# Patient Record
Sex: FEMALE | Race: WHITE | NOT HISPANIC OR LATINO | Employment: OTHER | URBAN - METROPOLITAN AREA
[De-identification: names, ages, dates, MRNs, and addresses within clinical notes are randomized per-mention and may not be internally consistent; named-entity substitution may affect disease eponyms.]

---

## 2017-11-21 ENCOUNTER — OFFICE VISIT (OUTPATIENT)
Dept: FAMILY MEDICINE CLINIC | Facility: CLINIC | Age: 82
End: 2017-11-21

## 2017-11-21 VITALS
WEIGHT: 162.8 LBS | DIASTOLIC BLOOD PRESSURE: 80 MMHG | BODY MASS INDEX: 27.79 KG/M2 | HEART RATE: 76 BPM | SYSTOLIC BLOOD PRESSURE: 170 MMHG | OXYGEN SATURATION: 98 % | HEIGHT: 64 IN

## 2017-11-21 DIAGNOSIS — K58.2 IRRITABLE BOWEL SYNDROME WITH BOTH CONSTIPATION AND DIARRHEA: ICD-10-CM

## 2017-11-21 DIAGNOSIS — S80.812A ABRASION, LEFT LOWER LEG, INITIAL ENCOUNTER: Primary | ICD-10-CM

## 2017-11-21 DIAGNOSIS — I10 ESSENTIAL HYPERTENSION: ICD-10-CM

## 2017-11-21 DIAGNOSIS — L03.116 CELLULITIS OF LEFT LOWER EXTREMITY: ICD-10-CM

## 2017-11-21 DIAGNOSIS — M15.9 PRIMARY OSTEOARTHRITIS INVOLVING MULTIPLE JOINTS: ICD-10-CM

## 2017-11-21 PROCEDURE — 99213 OFFICE O/P EST LOW 20 MIN: CPT | Performed by: FAMILY MEDICINE

## 2017-11-21 RX ORDER — CEPHALEXIN 500 MG/1
500 CAPSULE ORAL 2 TIMES DAILY
Qty: 14 CAPSULE | Refills: 0 | Status: SHIPPED | OUTPATIENT
Start: 2017-11-21 | End: 2017-11-28

## 2017-11-21 NOTE — PROGRESS NOTES
Subjective   Dacia Nelson is a 89 y.o. female    HPI Comments: Patient presents for follow-up from urgent care where she was seen for a wound to her left lower leg.  She was started on cephalexin 500 mg twice a day.  The area remains erythematous and quite tender.  The wound has developed an eschar.  She started on her antibiotics 3 days ago.  She feels like it has improved.  She is concerned about her elevated blood pressure.  Patient previously was on a diuretic but apparently that was discontinued somewhere along the line as we have not seen her for couple of years.    Cellulitis   Associated symptoms include arthralgias and myalgias.       The following portions of the patient's history were reviewed and updated as appropriate: allergies, current medications, past social history and problem list    Review of Systems   Constitutional: Negative.    HENT: Negative.    Eyes: Negative.    Respiratory: Negative.    Cardiovascular: Negative.    Musculoskeletal: Positive for arthralgias, gait problem and myalgias.   Skin: Positive for color change and wound.   Hematological: Negative.    Psychiatric/Behavioral: Negative.        Objective     Vitals:    11/21/17 1138   BP: 170/80   Pulse: 76   SpO2: 98%       Physical Exam   Constitutional: She appears well-developed and well-nourished.   HENT:   Head: Normocephalic and atraumatic.   Eyes: Conjunctivae are normal.   Cardiovascular: Normal rate and regular rhythm.    Pulmonary/Chest: Effort normal and breath sounds normal.   Skin: Skin is warm and dry. There is erythema.   1.5 cm wound with eschar left distal calf area with surrounding tender erythema.   Psychiatric: She has a normal mood and affect. Her behavior is normal.   Nursing note and vitals reviewed.      Assessment/Plan   Problem List Items Addressed This Visit        Cardiovascular and Mediastinum    Essential hypertension       Digestive    Irritable bowel syndrome with both constipation and diarrhea        Musculoskeletal and Integument    Primary osteoarthritis involving multiple joints      Other Visit Diagnoses     Abrasion, left lower leg, initial encounter    -  Primary    Relevant Medications    mupirocin (BACTROBAN) 2 % ointment    cephalexin (KEFLEX) 500 MG capsule    Cellulitis of left lower extremity

## 2017-11-25 ENCOUNTER — HOSPITAL ENCOUNTER (EMERGENCY)
Facility: HOSPITAL | Age: 82
Discharge: HOME | End: 2017-11-25
Attending: EMERGENCY MEDICINE | Admitting: EMERGENCY MEDICINE

## 2017-11-25 VITALS
HEIGHT: 62 IN | HEART RATE: 81 BPM | BODY MASS INDEX: 28.52 KG/M2 | SYSTOLIC BLOOD PRESSURE: 157 MMHG | OXYGEN SATURATION: 95 % | DIASTOLIC BLOOD PRESSURE: 69 MMHG | RESPIRATION RATE: 18 BRPM | TEMPERATURE: 98.7 F | WEIGHT: 155 LBS

## 2017-11-25 DIAGNOSIS — I87.8 VENOUS STASIS: ICD-10-CM

## 2017-11-25 DIAGNOSIS — L03.116 CELLULITIS OF LEFT LOWER EXTREMITY: Primary | ICD-10-CM

## 2017-11-25 LAB
ALBUMIN SERPL-MCNC: 3.9 G/DL (ref 3.2–4.8)
ALBUMIN/GLOB SERPL: 1.1 G/DL (ref 1.5–2.5)
ALP SERPL-CCNC: 73 U/L (ref 25–100)
ALT SERPL W P-5'-P-CCNC: 15 U/L (ref 7–40)
ANION GAP SERPL CALCULATED.3IONS-SCNC: 10 MMOL/L (ref 3–11)
AST SERPL-CCNC: 23 U/L (ref 0–33)
BASOPHILS # BLD AUTO: 0.05 10*3/MM3 (ref 0–0.2)
BASOPHILS NFR BLD AUTO: 0.6 % (ref 0–1)
BILIRUB SERPL-MCNC: 1.1 MG/DL (ref 0.3–1.2)
BUN BLD-MCNC: 17 MG/DL (ref 9–23)
BUN/CREAT SERPL: 17 (ref 7–25)
CALCIUM SPEC-SCNC: 8.5 MG/DL (ref 8.7–10.4)
CHLORIDE SERPL-SCNC: 105 MMOL/L (ref 99–109)
CO2 SERPL-SCNC: 26 MMOL/L (ref 20–31)
CREAT BLD-MCNC: 1 MG/DL (ref 0.6–1.3)
DEPRECATED RDW RBC AUTO: 50.2 FL (ref 37–54)
EOSINOPHIL # BLD AUTO: 0.18 10*3/MM3 (ref 0–0.3)
EOSINOPHIL NFR BLD AUTO: 2.2 % (ref 0–3)
ERYTHROCYTE [DISTWIDTH] IN BLOOD BY AUTOMATED COUNT: 13.3 % (ref 11.3–14.5)
GFR SERPL CREATININE-BSD FRML MDRD: 52 ML/MIN/1.73
GLOBULIN UR ELPH-MCNC: 3.4 GM/DL
GLUCOSE BLD-MCNC: 101 MG/DL (ref 70–100)
HCT VFR BLD AUTO: 44.9 % (ref 34.5–44)
HGB BLD-MCNC: 15.3 G/DL (ref 11.5–15.5)
IMM GRANULOCYTES # BLD: 0.02 10*3/MM3 (ref 0–0.03)
IMM GRANULOCYTES NFR BLD: 0.2 % (ref 0–0.6)
LYMPHOCYTES # BLD AUTO: 1.3 10*3/MM3 (ref 0.6–4.8)
LYMPHOCYTES NFR BLD AUTO: 15.6 % (ref 24–44)
MCH RBC QN AUTO: 34.9 PG (ref 27–31)
MCHC RBC AUTO-ENTMCNC: 34.1 G/DL (ref 32–36)
MCV RBC AUTO: 102.3 FL (ref 80–99)
MONOCYTES # BLD AUTO: 0.75 10*3/MM3 (ref 0–1)
MONOCYTES NFR BLD AUTO: 9 % (ref 0–12)
NEUTROPHILS # BLD AUTO: 6.05 10*3/MM3 (ref 1.5–8.3)
NEUTROPHILS NFR BLD AUTO: 72.4 % (ref 41–71)
PLATELET # BLD AUTO: 237 10*3/MM3 (ref 150–450)
PMV BLD AUTO: 10 FL (ref 6–12)
POTASSIUM BLD-SCNC: 4.3 MMOL/L (ref 3.5–5.5)
PROT SERPL-MCNC: 7.3 G/DL (ref 5.7–8.2)
RBC # BLD AUTO: 4.39 10*6/MM3 (ref 3.89–5.14)
SODIUM BLD-SCNC: 141 MMOL/L (ref 132–146)
WBC NRBC COR # BLD: 8.35 10*3/MM3 (ref 3.5–10.8)

## 2017-11-25 PROCEDURE — 96368 THER/DIAG CONCURRENT INF: CPT

## 2017-11-25 PROCEDURE — 25010000002 VANCOMYCIN 10 G RECONSTITUTED SOLUTION: Performed by: PHYSICIAN ASSISTANT

## 2017-11-25 PROCEDURE — 25010000002 MEROPENEM: Performed by: PHYSICIAN ASSISTANT

## 2017-11-25 PROCEDURE — 85025 COMPLETE CBC W/AUTO DIFF WBC: CPT | Performed by: PHYSICIAN ASSISTANT

## 2017-11-25 PROCEDURE — 87205 SMEAR GRAM STAIN: CPT | Performed by: PHYSICIAN ASSISTANT

## 2017-11-25 PROCEDURE — 80053 COMPREHEN METABOLIC PANEL: CPT | Performed by: PHYSICIAN ASSISTANT

## 2017-11-25 PROCEDURE — 96365 THER/PROPH/DIAG IV INF INIT: CPT

## 2017-11-25 PROCEDURE — 96366 THER/PROPH/DIAG IV INF ADDON: CPT

## 2017-11-25 PROCEDURE — 87070 CULTURE OTHR SPECIMN AEROBIC: CPT | Performed by: PHYSICIAN ASSISTANT

## 2017-11-25 PROCEDURE — 99283 EMERGENCY DEPT VISIT LOW MDM: CPT

## 2017-11-25 RX ORDER — CLINDAMYCIN HYDROCHLORIDE 300 MG/1
300 CAPSULE ORAL 3 TIMES DAILY
Qty: 21 CAPSULE | Refills: 0 | Status: SHIPPED | OUTPATIENT
Start: 2017-11-25 | End: 2017-12-13

## 2017-11-25 RX ORDER — VANCOMYCIN/0.9 % SOD CHLORIDE 1.5G/250ML
1500 PLASTIC BAG, INJECTION (ML) INTRAVENOUS ONCE
Status: COMPLETED | OUTPATIENT
Start: 2017-11-25 | End: 2017-11-25

## 2017-11-25 RX ORDER — SODIUM CHLORIDE 0.9 % (FLUSH) 0.9 %
10 SYRINGE (ML) INJECTION AS NEEDED
Status: DISCONTINUED | OUTPATIENT
Start: 2017-11-25 | End: 2017-11-25 | Stop reason: HOSPADM

## 2017-11-25 RX ADMIN — MEROPENEM 1 G: 1 INJECTION, POWDER, FOR SOLUTION INTRAVENOUS at 13:21

## 2017-11-25 RX ADMIN — VANCOMYCIN HYDROCHLORIDE 1500 MG: 10 INJECTION, POWDER, LYOPHILIZED, FOR SOLUTION INTRAVENOUS at 13:21

## 2017-11-25 NOTE — ED PROVIDER NOTES
Subjective   HPI Comments: 89-year-old female presents with a one-week history of wound to the posterior aspect of her left lower extremity, mid calf.  Seen by  UTC 11/18, placed on keflex 500mg bid.  Seen by PCP 2 d later, added bactroban.  Wound is worsening, increased redness, increased painprimary care provider placed on bactroban ointment.  Increased pain, swelling, redness, and drainage.  No fevers, chills, sweats.    Patient is a 89 y.o. female presenting with lower extremity pain.   Lower Extremity Issue   Location:  Leg  Time since incident:  1 week  Injury: no    Leg location:  L leg  Pain details:     Quality:  Throbbing    Severity:  Moderate    Onset quality:  Gradual    Duration:  1 week    Timing:  Constant    Progression:  Worsening  Chronicity:  New  Dislocation: no    Foreign body present:  No foreign bodies  Tetanus status:  Unknown  Prior injury to area:  No  Relieved by:  Nothing  Worsened by:  Nothing  Ineffective treatments:  None tried  Associated symptoms: swelling    Associated symptoms: no back pain, no decreased ROM, no fatigue, no fever and no stiffness        Review of Systems   Constitutional: Negative for fatigue and fever.   Musculoskeletal: Negative for back pain and stiffness.   Skin: Positive for wound.        Per HPI   All other systems reviewed and are negative.      Past Medical History:   Diagnosis Date   • Cellulitis        Allergies   Allergen Reactions   • Eggs Or Egg-Derived Products Hives   • Penicillins    • Sulfa Antibiotics        History reviewed. No pertinent surgical history.    History reviewed. No pertinent family history.    Social History     Social History   • Marital status:      Spouse name: N/A   • Number of children: N/A   • Years of education: N/A     Social History Main Topics   • Smoking status: Never Smoker   • Smokeless tobacco: Never Used   • Alcohol use No   • Drug use: No   • Sexual activity: Defer     Other Topics Concern   • None     Social  History Narrative   • None           Objective   Physical Exam   Constitutional: She is oriented to person, place, and time. She appears well-developed and well-nourished. No distress.   HENT:   Head: Normocephalic and atraumatic.   Right Ear: External ear normal.   Left Ear: External ear normal.   Nose: Nose normal.   Mouth/Throat: Oropharynx is clear and moist. No oropharyngeal exudate.   Eyes: Conjunctivae and EOM are normal. Pupils are equal, round, and reactive to light. Right eye exhibits no discharge. Left eye exhibits no discharge. No scleral icterus.   Neck: Normal range of motion. Neck supple. No JVD present. No tracheal deviation present. No thyromegaly present.   Cardiovascular: Normal rate.    Pulmonary/Chest: Effort normal. No stridor. No respiratory distress.   Abdominal: Soft. She exhibits no distension.   Musculoskeletal: Normal range of motion. She exhibits no edema, tenderness or deformity.   Neurological: She is alert and oriented to person, place, and time. No cranial nerve deficit. She exhibits normal muscle tone. Coordination normal.   Skin: Skin is warm and dry. No rash noted. She is not diaphoretic. No erythema. No pallor.   2 cm diameter necrotic wound to mid posterior left calf.  3 cm surrounding erythema and induration, left lower extremity with slight increased soft tissue swelling diffusely compared to left.  Homans sign absent.  Neurovascular status intact distally.  No lymphangitic streaking   Psychiatric: She has a normal mood and affect. Her behavior is normal. Judgment and thought content normal.   Nursing note and vitals reviewed.      Procedures        Recent Results (from the past 24 hour(s))   CBC Auto Differential    Collection Time: 11/25/17  1:06 PM   Result Value Ref Range    WBC 8.35 3.50 - 10.80 10*3/mm3    RBC 4.39 3.89 - 5.14 10*6/mm3    Hemoglobin 15.3 11.5 - 15.5 g/dL    Hematocrit 44.9 (H) 34.5 - 44.0 %    .3 (H) 80.0 - 99.0 fL    MCH 34.9 (H) 27.0 - 31.0 pg     MCHC 34.1 32.0 - 36.0 g/dL    RDW 13.3 11.3 - 14.5 %    RDW-SD 50.2 37.0 - 54.0 fl    MPV 10.0 6.0 - 12.0 fL    Platelets 237 150 - 450 10*3/mm3    Neutrophil % 72.4 (H) 41.0 - 71.0 %    Lymphocyte % 15.6 (L) 24.0 - 44.0 %    Monocyte % 9.0 0.0 - 12.0 %    Eosinophil % 2.2 0.0 - 3.0 %    Basophil % 0.6 0.0 - 1.0 %    Immature Grans % 0.2 0.0 - 0.6 %    Neutrophils, Absolute 6.05 1.50 - 8.30 10*3/mm3    Lymphocytes, Absolute 1.30 0.60 - 4.80 10*3/mm3    Monocytes, Absolute 0.75 0.00 - 1.00 10*3/mm3    Eosinophils, Absolute 0.18 0.00 - 0.30 10*3/mm3    Basophils, Absolute 0.05 0.00 - 0.20 10*3/mm3    Immature Grans, Absolute 0.02 0.00 - 0.03 10*3/mm3   Comprehensive Metabolic Panel    Collection Time: 11/25/17  2:11 PM   Result Value Ref Range    Glucose 101 (H) 70 - 100 mg/dL    BUN 17 9 - 23 mg/dL    Creatinine 1.00 0.60 - 1.30 mg/dL    Sodium 141 132 - 146 mmol/L    Potassium 4.3 3.5 - 5.5 mmol/L    Chloride 105 99 - 109 mmol/L    CO2 26.0 20.0 - 31.0 mmol/L    Calcium 8.5 (L) 8.7 - 10.4 mg/dL    Total Protein 7.3 5.7 - 8.2 g/dL    Albumin 3.90 3.20 - 4.80 g/dL    ALT (SGPT) 15 7 - 40 U/L    AST (SGOT) 23 0 - 33 U/L    Alkaline Phosphatase 73 25 - 100 U/L    Total Bilirubin 1.1 0.3 - 1.2 mg/dL    eGFR Non African Amer 52 (L) >60 mL/min/1.73    Globulin 3.4 gm/dL    A/G Ratio 1.1 (L) 1.5 - 2.5 g/dL    BUN/Creatinine Ratio 17.0 7.0 - 25.0    Anion Gap 10.0 3.0 - 11.0 mmol/L     Note: In addition to lab results from this visit, the labs listed above may include labs taken at another facility or during a different encounter within the last 24 hours. Please correlate lab times with ED admission and discharge times for further clarification of the services performed during this visit.    No orders to display     Vitals:    11/25/17 1114 11/25/17 1126 11/25/17 1455   BP: (!) 188/80 159/72 157/69   BP Location: Right arm  Right arm   Patient Position: Sitting Sitting Sitting   Pulse: 79  81   Resp: 18  18   Temp: 98.4  "°F (36.9 °C)  98.7 °F (37.1 °C)   TempSrc: Oral  Oral   SpO2: 94%  95%   Weight: 155 lb (70.3 kg)     Height: 62\" (157.5 cm)       Medications   sodium chloride 0.9 % flush 10 mL (not administered)   vancomycin IVPB 1500 mg in 0.9% NaCl (Premix) 500 mL (0 mg Intravenous Stopped 11/25/17 1455)   meropenem (MERREM) 1 g/100 mL 0.9% NS VTB (mbp) (0 g Intravenous Stopped 11/25/17 1351)     ECG/EMG Results (last 24 hours)     ** No results found for the last 24 hours. **            ED Course  ED Course                  MDM    Final diagnoses:   Cellulitis of left lower extremity   Venous stasis            Prosper Jansen PA-C  11/25/17 5807    "

## 2017-11-25 NOTE — DISCHARGE INSTRUCTIONS
Discontinue Keflex.  May continue using mupirocin ointment.  Begin taking clindamycin tonight ×1 dose.  Keep leg elevated as much as possible.  Keep wound clean and dry.  You will be contacted by outpatient registration on the next business day to schedule your appointment with the wound care clinic.  Return to the emergency department immediately if any change or worsening of your symptoms.

## 2017-11-27 ENCOUNTER — TELEPHONE (OUTPATIENT)
Dept: FAMILY MEDICINE CLINIC | Facility: CLINIC | Age: 82
End: 2017-11-27

## 2017-11-27 LAB
BACTERIA SPEC AEROBE CULT: NORMAL
GRAM STN SPEC: NORMAL
GRAM STN SPEC: NORMAL

## 2017-11-27 NOTE — TELEPHONE ENCOUNTER
----- Message from Brandie Grey sent at 11/27/2017 11:53 AM EST -----  Contact: PATIENT  PATIENT NEED SOME MEDICATION FOR TENDER MOUTH. PATIENT SAYS SHE HAS VERY TENDER MOUTH BECAUSE OF MEDICATION SHE WAS PUT ON FROM HER ER VISIT.A GOOD CALL BACK NUMBER [phone]. THANK YOU.

## 2017-11-29 ENCOUNTER — HOSPITAL ENCOUNTER (OUTPATIENT)
Dept: PHYSICAL THERAPY | Facility: HOSPITAL | Age: 82
Setting detail: THERAPIES SERIES
Discharge: HOME | End: 2017-11-29

## 2017-11-29 ENCOUNTER — TELEPHONE (OUTPATIENT)
Dept: FAMILY MEDICINE CLINIC | Facility: CLINIC | Age: 82
End: 2017-11-29

## 2017-11-29 DIAGNOSIS — S81.802D OPEN WOUND OF LEFT LOWER EXTREMITY WITHOUT COMPLICATION, SUBSEQUENT ENCOUNTER: Primary | ICD-10-CM

## 2017-11-29 PROCEDURE — 97597 DBRDMT OPN WND 1ST 20 CM/<: CPT

## 2017-11-29 PROCEDURE — 97162 PT EVAL MOD COMPLEX 30 MIN: CPT

## 2017-11-29 PROCEDURE — G8991 OTHER PT/OT GOAL STATUS: HCPCS

## 2017-11-29 PROCEDURE — G8990 OTHER PT/OT CURRENT STATUS: HCPCS

## 2017-11-29 NOTE — THERAPY EVALUATION
Outpatient Rehabilitation - Wound/Debridement Initial Eval  [facility]     Patient Name: Dacia Nelson  : 1925  MRN: [id number]  Today's Date: 2017                Admit Date: 2017    Visit Dx:    ICD-10-CM ICD-9-CM   1. Open wound of left lower extremity without complication, subsequent encounter S81.802D V58.89     891.0       Patient Active Problem List   Diagnosis   • Essential hypertension   • Irritable bowel syndrome with both constipation and diarrhea   • Primary osteoarthritis involving multiple joints        Past Medical History:   Diagnosis Date   • Arthritis    • Cellulitis         Past Surgical History:   Procedure Laterality Date   • HYSTERECTOMY               Patient History       17 0945          History    Chief Complaint Ulcer, wound or other skin condition  -      Brief Description of Current Complaint Several weeks ago, pt ran into an ajar dresser drawer on her way to the bathroom at night. Pt left the area alone for about a week before the redness and swelling caused her to seek medical attn at urgent care. Pt was placed on abx for about one week, which did not help, so pt presented to the ER and was placed on different abx and bactroban ointment while a wound culture was pending. Pt reports the area burst open last night while she was in the restroom, spilling bright red blood everywhere.  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  -      Patient seeing anyone else for problem(s)? Yes   PCP  -      How has patient tried to help current problem? Pt has applied bactroban ointment 3x/day and has washed the area with soap and water. Only  covered the area with telfa/tape after it burst open yesterday.  -      What clinical tests have you had for this problem? Other 1 (comment)   wound culture  -MC      Results of Clinical Tests Normal culture - no growth  -MC      Related/Recent Hospitalizations No  -MC      Are you or can you be pregnant No  -MC      Pain     Pain Location Leg  -MC      Pain at Present 3  -MC      Pain at Best 0  -MC      Pain at Worst 7   when touched  -MC      Services    Are you currently receiving Home Health services No  -MC      Do you plan to receive Home Health services in the near future No  -MC      Daily Activities    Primary Language English  -MC      Are you able to read Yes  -MC      Are you able to write Yes  -MC      How does patient learn best? Listening;Demonstration  -MC      Teaching needs identified Management of Condition  -MC      Patient is concerned about/has problems with Other (comment)   wound mgmt  -MC      Does patient have problems with the following? None  -MC      Barriers to learning None  -MC      Pt Participated in POC and Goals Yes  -MC      Safety    Are you being hurt, hit, or frightened by anyone at home or in your life? No  -MC      Are you being neglected by a caregiver No  -MC        User Key  (r) = Recorded By, (t) = Taken By, (c) = Cosigned By    Initials Name Provider Type     Melanie Goldman, PT Physical Therapist          EVALUATION            LDA Wound       11/29/17 0945          Wound 11/29/17 0945 Left posterior leg abrasion    Wound - Properties Group Date first assessed: 11/29/17  - Time first assessed: 0945  - Side: Left  - Orientation: posterior  - Location: leg  - Type: abrasion  -MC    Wound WDL ex   minimal periwound redness, swelling throughout LLE  -      Dressing Appearance dried drainage;moist drainage;intact   telfa pad, thin plastic tape  -MC      Base debrided;purple;reddened;yellow;slough;moist;pink   thin line of purple/red superior, remainder  "red/yellow  -      Periwound Area intact;pink;dry;redness;swelling  -      Edges open;irregular  -      Length (cm) 2.4  -MC      Width (cm) 1.4  -MC      Depth (cm) 0.1  -MC      Drainage Characteristics/Odor serosanguineous;yellow;no odor  -MC      Drainage Amount small  -      Picture taken yes  -      Wound Cleaning cleansed with;other (see comments)   phase one  -      Wound Interventions debrided  -      Dressing Dressing applied;silver impregnated dressing;low-adherent;foam   mepilex Ag, 4\" optifoam, size 4 compressogrip  -      Periwound Care cleansed with pH balanced cleanser;dry periwound area maintained;moisturizer applied   eucerin  -        User Key  (r) = Recorded By, (t) = Taken By, (c) = Cosigned By    Initials Name Provider Type    PINA Goldman, PT Physical Therapist            WOUND DEBRIDEMENT  Debridement Site 1  Location- Site 1: LLE  Selective Debridement- Site 1: Wound Surface <20cmsq  Instruments- Site 1: #15, scapel, tweezers  Excised Tissue Description- Site 1: moderate, slough, other (comment) (dried blood/exudate)  Bleeding- Site 1: none                   Therapy Education       11/29/17 4753          Therapy Education    Education Details Reviewed s/sx of infection and discussed role of PT in wound care. Pt and/or caregivers to change the bandage every 2-3 days between appts with silver foam and optifoam. Explained importance of light compression and leg elevation to promote reduction of LLE edema. Pt may shower, and should try to use cling film to protect the bandage.  -      Given Symptoms/condition management;Bandaging/dressing change;Edema management  -      Program New  -      How Provided Verbal;Demonstration  -      Provided to Patient;Caregiver  -      Level of Understanding Verbalized;Teach back education performed  -        User Key  (r) = Recorded By, (t) = Taken By, (c) = Cosigned By    Initials Name Provider Type    PINA Goldman, " PT Physical Therapist          Recommendation and Plan        PT Assessment/Plan       17 0945       PT Assessment    Functional Limitations Performance in self-care ADL;Other (comment)   wound mgmt  -     Impairments Integumentary integrity;Pain  -     Assessment Comments Pt presents with open wound s/p abrasion about 2 weeks ago. The wound is moist and pink/red with adherent yellow slough. Pt also with thin line of purple/red along the superior aspect of the wound that presents like the ecchymosis around/under a skin tear. Pt also with mild LLE edema that is worsened from her baseline. D/t the pt's evolving wound symptoms, pt will benefit from skilled PT wound care for debridement, edema management, advanced dressings management, and other appropriate interventions.  -     Rehab Potential Good  -     Patient/caregiver participated in establishment of treatment plan and goals Yes  -     Patient would benefit from skilled therapy intervention Yes  -MC     PT Plan    PT Frequency 2x/week  -     Predicted Duration of Therapy Intervention (days/wks) 8 visits, 2x/week  -     Planned CPT's? PT THER PROC EA 15 MIN: 04480;PT SHARON DEBRIDE OPEN WOUND UP TO 20 CM: 88591;PT NLFU MIST: 62706;PT MULTI LAYER COMP SYS LE;PT THER SUPP EA 15 MIN  -     Physical Therapy Interventions (Optional Details) patient/family education;wound care  -     PT Plan Comments debridement, dressings mgmt. MIST if progress stalls. Compressogrip to LLE  -       User Key  (r) = Recorded By, (t) = Taken By, (c) = Cosigned By    Initials Name Provider Type    PINA Goldman, PT Physical Therapist            Goals      First Last   PT Goal Re-Cert Due Date: 18  PT Goal Re-Cert Due Date: 18   PT Short Term Goals  STG 1: Pt and/or caregiver will verbalize s/sx of infection.  STG 2: Pt will demonstrate 75% reduction in necrotic tissue to promote healing.  STG 3: Pt will demonstrate 25% reduction in wound  dimensions to indicate progress. PT Short Term Goals  STG 1: Pt and/or caregiver will verbalize s/sx of infection.  STG 2: Pt will demonstrate 75% reduction in necrotic tissue to promote healing.  STG 3: Pt will demonstrate 25% reduction in wound dimensions to indicate progress.   Long Term Goals  LTG 1: Pt and/or caregivers will demonstrate/verbalize independence with home dressing changes.  LTG 2: Pt will demonstrate no remaining necrotic tissue.   LTG 3: Pt will demonstrate 75% reduction in wound dimensions to indicate progress.  Long Term Goals  LTG 1: Pt and/or caregivers will demonstrate/verbalize independence with home dressing changes.  LTG 2: Pt will demonstrate no remaining necrotic tissue.   LTG 3: Pt will demonstrate 75% reduction in wound dimensions to indicate progress.                   PT OP Goals       11/29/17 0945       PT Short Term Goals    STG 1 Pt and/or caregiver will verbalize s/sx of infection.  -     STG 2 Pt will demonstrate 75% reduction in necrotic tissue to promote healing.  -     STG 3 Pt will demonstrate 25% reduction in wound dimensions to indicate progress.  -     Long Term Goals    LTG 1 Pt and/or caregivers will demonstrate/verbalize independence with home dressing changes.  -     LTG 2 Pt will demonstrate no remaining necrotic tissue.   -     LTG 3 Pt will demonstrate 75% reduction in wound dimensions to indicate progress.  -     Time Calculation    PT Goal Re-Cert Due Date 02/27/18  -       User Key  (r) = Recorded By, (t) = Taken By, (c) = Cosigned By    Initials Name Provider Type     Melanie Goldman, PT Physical Therapist          Time Calculation: Start Time: 0945    Therapy Charges for Today     Code Description Service Date Service Provider Modifiers Qty    10450725483 HC PT OTHER PRIME FUNCT CURRENT 11/29/2017 Melanie Goldman, PT GP, CL 1    42690217780 HC PT OTHER PRIME FUNCT PROJECTED 11/29/2017 Melanie Goldman, PT GP, CI 1    15843544923 HC PT  EVAL MOD COMPLEXITY 4 11/29/2017 Melanie Goldman, PT GP 1    81949700944 HC SHARON DEBRIDE OPEN WOUND UP TO 20CM 11/29/2017 Melanie Goldman, PT GP 1          PT G-Codes  PT Professional Judgement Used?: Yes  Outcome Measure Options: BWAT (Parker-Levi Wound Assess Tool), Lower Extremity Functional Scale (LEFS)  Score: Approximately 75% limited in wound management.  Functional Limitation: Other PT primary  Other PT Primary Current Status (): At least 60 percent but less than 80 percent impaired, limited or restricted  Other PT Primary Goal Status (): At least 1 percent but less than 20 percent impaired, limited or restricted     Melanie Goldman, PT  11/29/2017

## 2017-12-01 ENCOUNTER — HOSPITAL ENCOUNTER (OUTPATIENT)
Dept: PHYSICAL THERAPY | Facility: HOSPITAL | Age: 82
Setting detail: THERAPIES SERIES
Discharge: HOME | End: 2017-12-01

## 2017-12-01 DIAGNOSIS — S81.802D OPEN WOUND OF LEFT LOWER EXTREMITY WITHOUT COMPLICATION, SUBSEQUENT ENCOUNTER: Primary | ICD-10-CM

## 2017-12-01 PROCEDURE — 97597 DBRDMT OPN WND 1ST 20 CM/<: CPT

## 2017-12-01 NOTE — THERAPY WOUND CARE TREATMENT
Outpatient Rehabilitation - Wound/Debridement Treatment Note  [facility]     Patient Name: Dacia Nelson  : 1925  MRN: [id number]  Today's Date: 2017                Admit Date: 2017    Visit Dx:    ICD-10-CM ICD-9-CM   1. Open wound of left lower extremity without complication, subsequent encounter S81.802D V58.89     891.0   Posterior L calf:      Patient Active Problem List   Diagnosis   • Essential hypertension   • Irritable bowel syndrome with both constipation and diarrhea   • Primary osteoarthritis involving multiple joints        Past Medical History:   Diagnosis Date   • Arthritis    • Cellulitis         Past Surgical History:   Procedure Laterality Date   • HYSTERECTOMY           EVALUATION        PT Ortho       17 1115    Subjective Comments    Subjective Comments Patient states she has been propping her legs up, but upon further questioning by PT states she is only using a footstool with a pillow on it, not elevating to level of heart or higher.  -    Subjective Pain    Able to rate subjective pain? yes  -    Pre-Treatment Pain Level 2  -    Post-Treatment Pain Level 2  -    Subjective Pain Comment pain with dressing removal and debridement.  -    Transfers    Transfer, Comment seated in [facility] chair for tx  -    Gait Assessment/Treatment    Gait, [place] Level stand by assist  -    Gait, Assistive Device straight cane  -      User Key  (r) = Recorded By, (t) = Taken By, (c) = Cosigned By    Initials Name Provider Type    TELLO Almazan, PT Physical Therapist                    [facility] Wound       17 111          Wound 17 0945 Left posterior leg abrasion    Wound - Properties Group Date first assessed: 17  - Time first assessed: 945  - Side: Left  - Orientation: posterior  - Location: leg  - Type: abrasion  -MC    Wound WDL ex   min periwound erythema, BLE edema  -      Dressing Appearance dried drainage;moist  "drainage;intact   min drainage on ag foam only  -      Base debrided;reddened;yellow;slough;moist;pink;granulating   superior skin edge necrosed, black  -      Periwound Area intact;pink;dry;redness;swelling  -      Edges open;irregular  -JM      Length (cm) 2.2  -JM      Width (cm) 1.4  -JM      Depth (cm) 0.1  -JM      Drainage Characteristics/Odor serosanguineous;yellow;no odor  -      Drainage Amount small  -      Picture taken yes  -      Wound Cleaning cleansed with;other (see comments)   phase one  -      Wound Interventions debrided  -      Dressing Dressing applied;silver impregnated dressing;low-adherent;foam   mepilex ag, 4\" optifoam gentle  -      Periwound Care cleansed with pH balanced cleanser;dry periwound area maintained;moisturizer applied  -        User Key  (r) = Recorded By, (t) = Taken By, (c) = Cosigned By    Initials Name Provider Type     Melanie Goldman, PT Physical Therapist     Ronel Almazan, PT Physical Therapist              Lymphedema       12/01/17 1115          Lymphedema Edema Assessment    Ptting Edema Category By severity  -      Pitting Edema Severe   BLE with 3+ pitting edema  -      Skin Changes/Observations    Location/Assessment Lower Extremity  -      Lower Extremity Conditions bilateral:;intact;clean;dry;shiny  -      Lower Extremity Color/Pigment bilateral:;hyperpigmented   multiple spider veins BLE  -      Lymphedema Pulses/Capillary Refill    Lymphedema Pulses/Capillary Refill lower extremity pulses;capillary refill  -      Dorsalis Pedis Pulse --   difficult to palpate thru edema  -      Posterior Tibialis Pulse --   difficult to palpate thru edema  -      Capillary Refill lower extremity capillary refill  -      Lower Extremity Capillary Refill right:;left:;less than 3 seconds  -      Compression/Skin Care    Compression/Skin Care skin care;wrapping location;bandaging  -      Skin Care washed/dried;lotion applied  - "      Wrapping Location lower extremity  -      Wrapping Location LE left:;foot to knee  -      Bandage Layers cotton elastic stocking- single layer (comment size)   size 4 compressogrip  -        User Key  (r) = Recorded By, (t) = Taken By, (c) = Cosigned By    Initials Name Provider Type    TELLO Almazan, PT Physical Therapist          WOUND DEBRIDEMENT  Debridement Site 1  Location- Site 1: LLE  Selective Debridement- Site 1: Wound Surface <20cmsq  Instruments- Site 1: tweezers  Excised Tissue Description- Site 1: minimum, slough  Bleeding- Site 1: none                   Therapy Education       12/01/17 1115          Therapy Education    Education Details Recommended use of compression stockings to BLE, may want to follow-up with PCP re: pitting edema of BLE, continue with home dressing change every 2-3 days or PRN.  Reinforced leg elevation to level of heart or higher to reduce edema.  -      Given Symptoms/condition management;Bandaging/dressing change;Edema management  -      Program Modified  -      How Provided Verbal;Demonstration  -      Provided to Patient;Caregiver  -      Level of Understanding Verbalized;Teach back education performed  -        User Key  (r) = Recorded By, (t) = Taken By, (c) = Cosigned By    Initials Name Provider Type    TELLO Almazan, PT Physical Therapist          Recommendation and Plan        PT Assessment/Plan       12/01/17 1115       PT Assessment    Functional Limitations Performance in self-care ADL;Other (comment)   wound mgmt  -     Impairments Integumentary integrity;Pain  -     Assessment Comments LLE wound with small decrease in length.  Less slough on wound base today.  Superior wound edge that was purple last tx now black and necrosed, should slough off over next couple txs.  Pt may benefit from MIST if not progressing as expected after next week.  -     Rehab Potential Good  -     Patient/caregiver participated in establishment  of treatment plan and goals Yes  -     Patient would benefit from skilled therapy intervention Yes  -     PT Plan    PT Frequency 2x/week  -     Physical Therapy Interventions (Optional Details) patient/family education;wound care  -     PT Plan Comments debridement, compression, dressing management, MIST if indicated  -       User Key  (r) = Recorded By, (t) = Taken By, (c) = Cosigned By    Initials Name Provider Type    TELLO Almazan, PT Physical Therapist          Goals        PT OP Goals       12/01/17 1115       Time Calculation    PT Goal Re-Cert Due Date 02/27/18  -       User Key  (r) = Recorded By, (t) = Taken By, (c) = Cosigned By    Initials Name Provider Type    TELLO Almazan, PT Physical Therapist          PT Goal Re-Cert Due Date: 02/27/18            Time Calculation: Start Time: 1115    Therapy Charges for Today     Code Description Service Date Service Provider Modifiers Qty    [id number]  SHARON DEBRIDE OPEN WOUND UP TO 20CM 12/1/2017 Ronel Almazan, PT GP 1                Ronel Almazan, PT  12/1/2017

## 2017-12-04 ENCOUNTER — HOSPITAL ENCOUNTER (OUTPATIENT)
Dept: PHYSICAL THERAPY | Facility: HOSPITAL | Age: 82
Setting detail: THERAPIES SERIES
Discharge: HOME | End: 2017-12-04

## 2017-12-04 DIAGNOSIS — S81.802D OPEN WOUND OF LEFT LOWER EXTREMITY WITHOUT COMPLICATION, SUBSEQUENT ENCOUNTER: Primary | ICD-10-CM

## 2017-12-04 PROCEDURE — 97597 DBRDMT OPN WND 1ST 20 CM/<: CPT

## 2017-12-04 NOTE — THERAPY WOUND CARE TREATMENT
Outpatient Rehabilitation - Wound/Debridement Treatment Note  [facility]     Patient Name: Dacia Nelson  : 1925  MRN: [id number]  Today's Date: 2017                Admit Date: 2017    Visit Dx:    ICD-10-CM ICD-9-CM   1. Open wound of left lower extremity without complication, subsequent encounter S81.802D V58.89     891.0       Patient Active Problem List   Diagnosis   • Essential hypertension   • Irritable bowel syndrome with both constipation and diarrhea   • Primary osteoarthritis involving multiple joints        Past Medical History:   Diagnosis Date   • Arthritis    • Cellulitis         Past Surgical History:   Procedure Laterality Date   • HYSTERECTOMY           EVALUATION        PT Ortho       17 1115    Subjective Comments    Subjective Comments No complaints or changes. Pt reports she tries to prop her legs up, but it's difficult to get them very high. Is going to ask her MD next week about a diuretic.  -    Subjective Pain    Able to rate subjective pain? yes  -MC    Pre-Treatment Pain Level 2  -MC    Post-Treatment Pain Level 4  -MC    Subjective Pain Comment pain with dressing removal and debridement  -    Transfers    Transfers, Sit-Stand [place] contact guard assist  -MC    Transfers, Stand-Sit [place] contact guard assist  -MC    Transfer, Comment remained in transport w/c for tx. Stood to take down/reapply stockings  -      User Key  (r) = Recorded By, (t) = Taken By, (c) = Cosigned By    Initials Name Provider Type    PINA Goldman, PT Physical Therapist                    [facility] Wound       17 1115          Wound 17 0945 Left posterior leg abrasion    Wound - Properties Group Date first assessed: 17  - Time first assessed: 945  - Side: Left  - Orientation: posterior  - Location: leg  - Type: abrasion  -MC    Wound WDL ex   min periwound erythema, BLE edema  -MC      Dressing Appearance dried drainage;moist  "drainage;intact   min drainage on ag foam only  -      Base debrided;reddened;yellow;slough;moist;pink;granulating   superior skin edge necrosed, black  -      Periwound Area intact;pink;dry;redness;swelling  -      Edges open;irregular  -MC      Length (cm) 2  -MC      Width (cm) 1.3  -MC      Depth (cm) 0.1  -MC      Drainage Characteristics/Odor serosanguineous;yellow;no odor  -MC      Drainage Amount small  -      Picture taken yes  -      Wound Cleaning cleansed with;other (see comments)   phase one  -      Wound Interventions debrided;honey   added small drop of honey to address slough/dryness  -      Dressing Dressing applied;silver impregnated dressing;low-adherent;foam   mepilex Ag, 4\" optifoam gentle, size 4 compressogrip  -      Periwound Care cleansed with pH balanced cleanser;dry periwound area maintained   theraworx wipe  -        User Key  (r) = Recorded By, (t) = Taken By, (c) = Cosigned By    Initials Name Provider Type    PINA Goldman, PT Physical Therapist            WOUND DEBRIDEMENT  Debridement Site 1  Location- Site 1: LLE  Selective Debridement- Site 1: Wound Surface <20cmsq  Instruments- Site 1: tweezers  Excised Tissue Description- Site 1: minimum, slough, other (comment) (necrotic blackened skin at superior edge)  Bleeding- Site 1: scant, held pressure, 1 minute                   Therapy Education       12/04/17 1115          Therapy Education    Education Details Pt to follow up with MD re: use of diuretics and fluid retention. Pt to continue to try to elevate legs.  -      Given Symptoms/condition management;Bandaging/dressing change;Edema management  -      Program Reinforced  -      How Provided Verbal;Demonstration  -MC      Provided to Patient;Caregiver  -      Level of Understanding Verbalized;Teach back education performed  -        User Key  (r) = Recorded By, (t) = Taken By, (c) = Cosigned By    Initials Name Provider Type    PINA SHER" Jones, PT Physical Therapist          Recommendation and Plan        PT Assessment/Plan       12/04/17 1115       PT Assessment    Functional Limitations Performance in self-care ADL;Other (comment)   wound mgmt  -     Impairments Integumentary integrity;Pain  -     Assessment Comments LLE with another small decrease in length since last session. PT able to debride portion of necrotic superior wound edge today along with some adherent slough/biofilm. However, debridement is limited by c/o pain. Added small amt of therahoney to attempt to encourage autolytic debridement between sessions. Will likely initiate MIST Friday or next week if progress continues to be slow.  -     Rehab Potential Good  -     Patient/caregiver participated in establishment of treatment plan and goals Yes  -     Patient would benefit from skilled therapy intervention Yes  -     PT Plan    PT Frequency 2x/week  -     Physical Therapy Interventions (Optional Details) patient/family education;wound care  -     PT Plan Comments debridement, compression, dressings management. MIST if indicated in next 1-2 treatments.  -       User Key  (r) = Recorded By, (t) = Taken By, (c) = Cosigned By    Initials Name Provider Type     Melanie Goldman, PT Physical Therapist          Goals        PT OP Goals       12/04/17 1115       Time Calculation    PT Goal Re-Cert Due Date 02/27/18  -       User Key  (r) = Recorded By, (t) = Taken By, (c) = Cosigned By    Initials Name Provider Type     Melanie Goldman, PT Physical Therapist          PT Goal Re-Cert Due Date: 02/27/18            Time Calculation: Start Time: 1115    Therapy Charges for Today     Code Description Service Date Service Provider Modifiers Qty    [id number]  SHARON DEBRIDE OPEN WOUND UP TO 20CM 12/4/2017 Melanie Goldman, PT GP 1                Melanie Goldman, PT  12/4/2017

## 2017-12-08 ENCOUNTER — HOSPITAL ENCOUNTER (OUTPATIENT)
Dept: PHYSICAL THERAPY | Facility: HOSPITAL | Age: 82
Setting detail: THERAPIES SERIES
Discharge: HOME | End: 2017-12-08

## 2017-12-08 PROCEDURE — 29581 APPL MULTLAYER CMPRN SYS LEG: CPT

## 2017-12-08 NOTE — THERAPY WOUND CARE TREATMENT
Outpatient Rehabilitation - Wound/Debridement Treatment Note  [facility]     Patient Name: Dacia Nelson  : 1925  MRN: [id number]  Today's Date: 2017                Admit Date: 2017    Visit Dx:  No diagnosis found.    Patient Active Problem List   Diagnosis   • Essential hypertension   • Irritable bowel syndrome with both constipation and diarrhea   • Primary osteoarthritis involving multiple joints        Past Medical History:   Diagnosis Date   • Arthritis    • Cellulitis         Past Surgical History:   Procedure Laterality Date   • HYSTERECTOMY           EVALUATION        PT Ortho       17 1400    Subjective Comments    Subjective Comments pt stated that with the last dressing change at home the home did burn more than it did with the dressing change done in OP   -MF    Subjective Pain    Able to rate subjective pain? yes  -MF    Pre-Treatment Pain Level 2  -MF    Post-Treatment Pain Level 2  -MF    Transfers    Transfers, Sit-Stand [place] contact guard assist  -MF    Transfers, Stand-Sit [place] contact guard assist  -MF    Transfer, Comment pt stood and transfered from w/c to bed. seen sitting EOB   -MF    Gait Assessment/Treatment    Gait, [place] Level stand by assist  -MF    Gait, Assistive Device straight cane  -      User Key  (r) = Recorded By, (t) = Taken By, (c) = Cosigned By    Initials Name Provider Type     Shaquille Howard, PT Physical Therapist                    [facility] Wound       17 1400          Wound 17 0945 Left posterior leg abrasion    Wound - Properties Group Date first assessed: 17  - Time first assessed: 945  - Side: Left  - Orientation: posterior  - Location: leg  - Type: abrasion  -MC    Wound WDL ex  -MF      Dressing Appearance moist drainage;intact  -MF      Base reddened;moist;pink;granulating  -      Periwound Area intact;pink;dry;redness;swelling  -      Edges open;irregular  -MF      Drainage  Characteristics/Odor serosanguineous;yellow;no odor  -MF      Drainage Amount small  -MF      Wound Cleaning cleansed with;other (see comments)   phase one  -MF      Dressing foam;low-adherent;silver impregnated dressing   family to apply therahoney at home.  Ag foam and optifoam  -MF        User Key  (r) = Recorded By, (t) = Taken By, (c) = Cosigned By    Initials Name Provider Type    ALESSANDRA Howard, PT Physical Therapist     Melanie Goldman, PT Physical Therapist              Lymphedema       12/08/17 1400          Lymphedema Edema Assessment    Ptting Edema Category By severity  -      Pitting Edema Moderate  -MF      Compression/Skin Care    Compression/Skin Care skin care;wrapping location;bandaging  -MF      Skin Care washed/dried;lotion applied  -MF      Wrapping Location lower extremity  -MF      Wrapping Location LE left:;foot to knee  -MF      Bandage Layers cotton elastic stocking- double layer (comment size)   size 4   -MF        User Key  (r) = Recorded By, (t) = Taken By, (c) = Cosigned By    Initials Name Provider Type    ALESSANDRA Howard, PT Physical Therapist          WOUND DEBRIDEMENT                      Therapy Education       12/08/17 1400          Therapy Education    Education Details Pt to cont with compression wrapping / LE elevation and diuretics per MD recommendation.   -MF      Given Symptoms/condition management;Bandaging/dressing change;Edema management  -      Program Reinforced  -MF      How Provided Verbal;Demonstration  -MF      Provided to Patient;Caregiver  -MF      Level of Understanding Verbalized;Teach back education performed  -        User Key  (r) = Recorded By, (t) = Taken By, (c) = Cosigned By    Initials Name Provider Type    ALESSANDRA Howard, PT Physical Therapist          Recommendation and Plan        PT Assessment/Plan       12/08/17 1400       PT Assessment    Functional Limitations Performance in self-care ADL;Other (comment)  -MF      Impairments Integumentary integrity;Pain  -     Assessment Comments Post LE wound noted to be reepithelializing well today with therahoney helping to autolytically debride wound and allow for more of slough to be removed today during cleaning.  Pt will benefit from cont compression to help reduce edema and Ag foam to decrease bioburden to improve healing potential.   -     Rehab Potential Good  -     Patient/caregiver participated in establishment of treatment plan and goals Yes  -     Patient would benefit from skilled therapy intervention Yes  -     PT Plan    PT Frequency 2x/week  -     Physical Therapy Interventions (Optional Details) patient/family education;wound care  -     PT Plan Comments debridement PRN with compression wrapping.   -       User Key  (r) = Recorded By, (t) = Taken By, (c) = Cosigned By    Initials Name Provider Type     Shaquille Howard, PT Physical Therapist          Goals        PT OP Goals       12/08/17 1400       Time Calculation    PT Goal Re-Cert Due Date 02/27/17  -       User Key  (r) = Recorded By, (t) = Taken By, (c) = Cosigned By    Initials Name Provider Type     Shaquille Howard, PT Physical Therapist          PT Goal Re-Cert Due Date: 02/27/17            Time Calculation: Start Time: 1400  Total Timed Code Minutes- PT: 25 minute(s)    Therapy Charges for Today     Code Description Service Date Service Provider Modifiers Qty    61752399719 HC PT MULTI LAYER COMP SYS BELOW KNEE 12/8/2017 Shaquille Howard, PT GP 1                Shaquille Howard, PT  12/8/2017

## 2017-12-11 ENCOUNTER — HOSPITAL ENCOUNTER (OUTPATIENT)
Dept: PHYSICAL THERAPY | Facility: HOSPITAL | Age: 82
Setting detail: THERAPIES SERIES
Discharge: HOME | End: 2017-12-11

## 2017-12-11 DIAGNOSIS — S81.802D OPEN WOUND OF LEFT LOWER EXTREMITY WITHOUT COMPLICATION, SUBSEQUENT ENCOUNTER: Primary | ICD-10-CM

## 2017-12-11 PROCEDURE — 97597 DBRDMT OPN WND 1ST 20 CM/<: CPT

## 2017-12-11 NOTE — THERAPY WOUND CARE TREATMENT
Outpatient Rehabilitation - Wound/Debridement Treatment Note  [facility]     Patient Name: Dacia Nelson  : 1925  MRN: [id number]  Today's Date: 2017                Admit Date: 2017    Visit Dx:    ICD-10-CM ICD-9-CM   1. Open wound of left lower extremity without complication, subsequent encounter S81.802D V58.89     891.0       Patient Active Problem List   Diagnosis   • Essential hypertension   • Irritable bowel syndrome with both constipation and diarrhea   • Primary osteoarthritis involving multiple joints        Past Medical History:   Diagnosis Date   • Arthritis    • Cellulitis         Past Surgical History:   Procedure Laterality Date   • HYSTERECTOMY           EVALUATION        PT Ortho       17 1530    Subjective Comments    Subjective Comments Pt had no complaints today.   -MF    Subjective Pain    Able to rate subjective pain? yes  -MF    Pre-Treatment Pain Level 0  -MF    Post-Treatment Pain Level 0  -MF    Transfers    Transfer, Comment pt to and from dept via w/c seen sitting in w/c for tx  -MF      User Key  (r) = Recorded By, (t) = Taken By, (c) = Cosigned By    Initials Name Provider Type    ALESSANDRA Howard, PT Physical Therapist                    [facility] Wound       17 1530          Wound 17 0945 Left posterior leg abrasion    Wound - Properties Group Date first assessed: 17  - Time first assessed: 945  - Side: Left  - Orientation: posterior  - Location: leg  - Type: abrasion  -MC    Wound WDL ex  -MF      Dressing Appearance moist drainage;intact  -MF      Base reddened;moist;pink;granulating  -MF      Periwound Area intact;pink;dry;redness;swelling  -MF      Edges open;irregular  -MF      Length (cm) 1  -MF      Width (cm) 1  -MF      Depth (cm) 0.1  -MF      Drainage Characteristics/Odor serosanguineous;yellow;no odor  -MF      Drainage Amount scant  -MF      Picture taken yes  -MF      Wound Cleaning irrigated with;sterile normal  saline  -MF      Wound Interventions debrided  -MF      Dressing foam;low-adherent;petroleum-based dressing  -MF        User Key  (r) = Recorded By, (t) = Taken By, (c) = Cosigned By    Initials Name Provider Type    ALESSANDRA Howard, PT Physical Therapist     Melanie Goldman, PT Physical Therapist            WOUND DEBRIDEMENT  Debridement Site 1  Location- Site 1: LLE  Selective Debridement- Site 1: Wound Surface <20cmsq  Instruments- Site 1: tweezers  Excised Tissue Description- Site 1: minimum, slough  Bleeding- Site 1: none                   Therapy Education       12/11/17 1530          Therapy Education    Education Details PT held compression today and discussed home dressing changes with pt until next appt.   -MF      Given Symptoms/condition management;Bandaging/dressing change;Edema management  -MF      Program Reinforced  -MF      How Provided Verbal;Demonstration  -MF      Provided to Patient;Caregiver  -MF      Level of Understanding Verbalized;Teach back education performed  -MF        User Key  (r) = Recorded By, (t) = Taken By, (c) = Cosigned By    Initials Name Provider Type    ALESSANDRA Howard, PT Physical Therapist          Recommendation and Plan        PT Assessment/Plan       12/11/17 1530       PT Assessment    Functional Limitations Performance in self-care ADL;Other (comment)  -MF     Impairments Integumentary integrity;Pain  -MF     Assessment Comments Post LE wound cont to reepithelialize well, but slightly dry wound base noted.  PT applied xeroform to help maintain moisture to improve healing potential and to allow for easier removal of remaining slough next visit. no LE edema noted in either leg and compression held to determine further need for LE compression.   -MF     Rehab Potential Good  -MF     Patient/caregiver participated in establishment of treatment plan and goals Yes  -MF     Patient would benefit from skilled therapy intervention Yes  -MF     PT Plan    PT  Frequency 2x/week  -     Physical Therapy Interventions (Optional Details) wound care;patient/family education  -     PT Plan Comments cont with home dressing changes and return in 4 days to determine further need for debridement / dressing management.   -       User Key  (r) = Recorded By, (t) = Taken By, (c) = Cosigned By    Initials Name Provider Type     Shaquille Howard, PT Physical Therapist          Goals        PT OP Goals       12/11/17 1530       Time Calculation    PT Goal Re-Cert Due Date 02/27/18  -       User Key  (r) = Recorded By, (t) = Taken By, (c) = Cosigned By    Initials Name Provider Type     Shaquille Howard, PT Physical Therapist          PT Goal Re-Cert Due Date: 02/27/18            Time Calculation: Start Time: 1530  Total Timed Code Minutes- PT: 25 minute(s)    Therapy Charges for Today     Code Description Service Date Service Provider Modifiers Qty    [id number] HC SHARON DEBRIDE OPEN WOUND UP TO 20CM 12/11/2017 Shaquille Howard, PT GP 1                Shaquille Howard PT  12/11/2017

## 2017-12-13 ENCOUNTER — OFFICE VISIT (OUTPATIENT)
Dept: FAMILY MEDICINE CLINIC | Facility: CLINIC | Age: 82
End: 2017-12-13

## 2017-12-13 VITALS
SYSTOLIC BLOOD PRESSURE: 122 MMHG | DIASTOLIC BLOOD PRESSURE: 78 MMHG | BODY MASS INDEX: 28.16 KG/M2 | TEMPERATURE: 98.3 F | HEIGHT: 62 IN | OXYGEN SATURATION: 98 % | HEART RATE: 75 BPM | WEIGHT: 153 LBS

## 2017-12-13 DIAGNOSIS — L03.116 CELLULITIS OF LEFT LOWER EXTREMITY: Primary | ICD-10-CM

## 2017-12-13 DIAGNOSIS — S80.812D ABRASION, LEFT LOWER LEG, SUBSEQUENT ENCOUNTER: ICD-10-CM

## 2017-12-13 PROCEDURE — 99213 OFFICE O/P EST LOW 20 MIN: CPT | Performed by: FAMILY MEDICINE

## 2017-12-13 NOTE — PROGRESS NOTES
Subjective   Dacia Nelson is a 89 y.o. female    HPI Comments: Patient presents for a follow-up visit regarding left lower extremity abrasion subsequent cellulitis.  She has been attending outpatient physical therapy and wound care on a regular basis.  She and the therapist are very pleased with the progress she has made.  Erythema has resolved and tenderness markedly reduced.  The wound has granulated in nicely and patient is very pleased.  She has no other complaints today.    Wound Check         The following portions of the patient's history were reviewed and updated as appropriate: allergies, current medications, past social history and problem list    Review of Systems   Constitutional: Negative.    HENT: Negative.    Eyes: Negative.    Respiratory: Negative.    Cardiovascular: Negative.    Musculoskeletal: Positive for arthralgias, gait problem and myalgias.   Skin: Positive for color change and wound.   Hematological: Negative.    Psychiatric/Behavioral: Negative.        Objective     Vitals:    12/13/17 1417   BP: 122/78   Pulse: 75   Temp: 98.3 °F (36.8 °C)   SpO2: 98%       Physical Exam   Constitutional: She appears well-developed and well-nourished.   HENT:   Head: Normocephalic and atraumatic.   Eyes: Conjunctivae are normal.   Cardiovascular: Normal rate and regular rhythm.    Pulmonary/Chest: Effort normal and breath sounds normal.   Skin: Skin is warm and dry. Abrasion noted. There is erythema.   Psychiatric: She has a normal mood and affect. Her behavior is normal.   Nursing note and vitals reviewed.      Assessment/Plan   Problem List Items Addressed This Visit     None      Visit Diagnoses     Cellulitis of left lower extremity    -  Primary    Abrasion, left lower leg, subsequent encounter

## 2017-12-18 ENCOUNTER — HOSPITAL ENCOUNTER (OUTPATIENT)
Dept: PHYSICAL THERAPY | Facility: HOSPITAL | Age: 82
Setting detail: THERAPIES SERIES
Discharge: HOME | End: 2017-12-18

## 2017-12-18 DIAGNOSIS — S81.802D OPEN WOUND OF LEFT LOWER EXTREMITY WITHOUT COMPLICATION, SUBSEQUENT ENCOUNTER: Primary | ICD-10-CM

## 2017-12-18 PROCEDURE — 97597 DBRDMT OPN WND 1ST 20 CM/<: CPT

## 2017-12-18 NOTE — THERAPY WOUND CARE TREATMENT
Outpatient Rehabilitation - Wound/Debridement Treatment Note   [place]     Patient Name: Dacia Nelson  : 1925  MRN: [id number]  Today's Date: 2017                Admit Date: 2017    Visit Dx:    ICD-10-CM ICD-9-CM   1. Open wound of left lower extremity without complication, subsequent encounter S81.802D V58.89     891.0     Posterior L calf:      Patient Active Problem List   Diagnosis   • Essential hypertension   • Irritable bowel syndrome with both constipation and diarrhea   • Primary osteoarthritis involving multiple joints        Past Medical History:   Diagnosis Date   • Arthritis    • Cellulitis         Past Surgical History:   Procedure Laterality Date   • HYSTERECTOMY           EVALUATION        PT Ortho       17 1430    Subjective Comments    Subjective Comments Pt without complaints, states the bandage came off yesterday so she had to put a new one on but did not have the xeroform.  -JM    Subjective Pain    Able to rate subjective pain? yes  -JM    Pre-Treatment Pain Level 0  -JM    Post-Treatment Pain Level 0  -JM    Transfers    [facility] daughter  -      User Key  (r) = Recorded By, (t) = Taken By, (c) = Cosigned By    Initials Name Provider Type    TELLO Almazan, PT Physical Therapist                    LDA Wound       17 1430          Wound 17 0945 Left posterior leg abrasion    Wound - Properties Group Date first assessed: 17  - Time first assessed: 945  - Side: Left  - Orientation: posterior  - Location: leg  - Type: abrasion  -MC    Wound WDL ex  -JM      Dressing Appearance intact;dried drainage  -JM      Base reddened;epithelialization;pink;clean;yellow   90% intact new skin, 10% dry adherent crust  -JM      Periwound Area intact;pink;dry;swelling  -JM      Edges other (see comments)   indistinct  -JM      Length (cm) 0.5  -JM      Width (cm) 0.4  -JM      Depth (cm)  "0  -      Drainage Characteristics/Odor no odor;serous  -      Drainage Amount scant  -      Picture taken yes  -      Wound Cleaning cleansed with;other (see comments)   phase one  -      Wound Interventions debrided  -      Dressing Dressing applied;petroleum-based dressing;low-adherent;foam   xeroform, 4\" optifoam gentle  -        User Key  (r) = Recorded By, (t) = Taken By, (c) = Cosigned By    Initials Name Provider Type    PINA Goldman, PT Physical Therapist    TELLO Almazan, PT Physical Therapist            WOUND DEBRIDEMENT  Debridement Site 1  Location- Site 1: LLE  Selective Debridement- Site 1: Wound Surface <20cmsq  Instruments- Site 1: tweezers, scissors  Excised Tissue Description- Site 1: moderate, other (comment) (hypertrophic crust, dried exudate)  Bleeding- Site 1: none                   Therapy Education       12/18/17 1430          Therapy Education    Education Details Wound nearly healed, continue to use dressings x1 week or until fully closed, change 3x/week or if soiled/disrupted.  May call to cancel follow-up if wound is healed.  -      Given Symptoms/condition management;Bandaging/dressing change;Edema management  -      Program Reinforced  -      How Provided Verbal;Demonstration  -      Provided to Patient;Caregiver  -      Level of Understanding Verbalized;Teach back education performed  -        User Key  (r) = Recorded By, (t) = Taken By, (c) = Cosigned By    Initials Name Provider Type    TELLO Almazan, PT Physical Therapist          Recommendation and Plan        PT Assessment/Plan       12/18/17 1430       PT Assessment    Functional Limitations Performance in self-care ADL;Other (comment)  -     Impairments Integumentary integrity;Pain  -     Assessment Comments LLE wound re-epithelialized, with only small yellow crust remaining too adherent to debride.  Anticipate full closure of wound in next few days.  Instructed pt/family in " continued home dressing changes to perform 3x/week, follow-up 12/27/17 PRN.  -     Rehab Potential Good  -     Patient/caregiver participated in establishment of treatment plan and goals Yes  -     Patient would benefit from skilled therapy intervention Yes  -     PT Plan    PT Frequency Other (comment)   follow-up in 1 week PRN  -     Physical Therapy Interventions (Optional Details) patient/family education;wound care  -     PT Plan Comments tentative d/c today  -       User Key  (r) = Recorded By, (t) = Taken By, (c) = Cosigned By    Initials Name Provider Type    TELLO Almazan, PT Physical Therapist          Goals        PT OP Goals       12/18/17 1430       Time Calculation    PT Goal Re-Cert Due Date 02/27/18  -       User Key  (r) = Recorded By, (t) = Taken By, (c) = Cosigned By    Initials Name Provider Type    TELLO Almazan, PT Physical Therapist          PT Goal Re-Cert Due Date: 02/27/18            Time Calculation: Start Time: 1430    Therapy Charges for Today     Code Description Service Date Service Provider Modifiers Qty    58174601032 HC SHARON DEBRIDE OPEN WOUND UP TO 20CM 12/18/2017 Ronel Almazan, PT GP 1                Ronel Almazan, PT  12/18/2017

## 2017-12-27 ENCOUNTER — HOSPITAL ENCOUNTER (OUTPATIENT)
Dept: PHYSICAL THERAPY | Facility: HOSPITAL | Age: 82
Setting detail: THERAPIES SERIES
Discharge: HOME | End: 2017-12-27

## 2017-12-27 DIAGNOSIS — S81.802D OPEN WOUND OF LEFT LOWER EXTREMITY WITHOUT COMPLICATION, SUBSEQUENT ENCOUNTER: Primary | ICD-10-CM

## 2017-12-27 PROCEDURE — G8990 OTHER PT/OT CURRENT STATUS: HCPCS

## 2017-12-27 PROCEDURE — G8992 OTHER PT/OT  D/C STATUS: HCPCS

## 2017-12-27 PROCEDURE — G8991 OTHER PT/OT GOAL STATUS: HCPCS

## 2017-12-27 NOTE — THERAPY WOUND CARE TREATMENT
Outpatient Rehabilitation - Wound/Debridement Treatment Note   [place]     Patient Name: Dacia Nelson  : 1925  MRN: [id number]  Today's Date: 2017                Admit Date: 2017    Visit Dx:    ICD-10-CM ICD-9-CM   1. Open wound of left lower extremity without complication, subsequent encounter S81.802D V58.89     891.0       Patient Active Problem List   Diagnosis   • Essential hypertension   • Irritable bowel syndrome with both constipation and diarrhea   • Primary osteoarthritis involving multiple joints        Past Medical History:   Diagnosis Date   • Arthritis    • Cellulitis         Past Surgical History:   Procedure Laterality Date   • HYSTERECTOMY           EVALUATION        PT Ortho       17 1430    Subjective Comments    Subjective Comments Pt without complaint  -ES    Subjective Pain    Able to rate subjective pain? yes  -ES    Pre-Treatment Pain Level 0  -ES    Post-Treatment Pain Level 0  -ES    Transfers    Transfers, Sit-Stand [place] contact guard assist  -ES    Transfers, Stand-Sit [place] contact guard assist  -ES    Transfer, Comment remained in transport chair  -ES      User Key  (r) = Recorded By, (t) = Taken By, (c) = Cosigned By    Initials Name Provider Type    LINDSAY Amaro, PT Physical Therapist                    LDA Wound       17 1430          [REMOVED] Wound 17 0945 Left posterior leg abrasion    Wound - Properties Group Date first assessed: 17  - Time first assessed: 945  - Side: Left  - Orientation: posterior  -MC Location: leg  -MC Type: abrasion  -MC Resolution Date: 17  -ES Resolution Time: 1606  -ES    Wound WDL WDL  -ES      Dressing Appearance intact;dry  -ES      Base closed/resurfaced  -ES      Periwound Area intact;pink;dry  -ES      Wound Cleaning cleansed with;other (see comments)   phase one  -ES      Wound Interventions other (see comments)   no intervention performed this session  -ES         User Key  (r) = Recorded By, (t) = Taken By, (c) = Cosigned By    Initials Name Provider Type    ES Karley Amaro, PT Physical Therapist    PINA Goldman, PT Physical Therapist            WOUND DEBRIDEMENT                      Therapy Education       12/27/17 1400          Therapy Education    Given Symptoms/condition management;Bandaging/dressing change;Edema management  -ES      Program Reinforced  -ES      How Provided Verbal;Demonstration  -ES      Provided to Patient;Caregiver  -ES      Level of Understanding Verbalized;Teach back education performed  -ES        User Key  (r) = Recorded By, (t) = Taken By, (c) = Cosigned By    Initials Name Provider Type    ES Karley Amaro PT Physical Therapist          Recommendation and Plan        PT Assessment/Plan       12/27/17 1430       PT Assessment    Functional Limitations Performance in self-care ADL;Other (comment)   wound management  -ES     Assessment Comments LLE wound closed without no visible issues to provide intervention today. Will hold patient chart for 30 days for return if newly formed tissue has complication, otherwise patient will be discharged home without follow up.  -ES     PT Plan    Physical Therapy Interventions (Optional Details) patient/family education;wound care  -ES     PT Plan Comments D/C pending 30 day hold for need to return  -ES       User Key  (r) = Recorded By, (t) = Taken By, (c) = Cosigned By    Initials Name Provider Type    ES Karley Amaro PT Physical Therapist          Goals                   Time Calculation: Start Time: 1430  Total Timed Code Minutes- PT: 20 minute(s)    Therapy Charges for Today     Code Description Service Date Service Provider Modifiers Qty    49813257152 HC PT OTHER PRIME FUNCT CURRENT 12/27/2017 Karley Amaro, PT GP, [place] 1    [id number]  PT OTHER PRIME FUNCT PROJECTED 12/27/2017 Karley Amaro PT GP, CH 1    [id number]  PT OTHER PRIME FUNCT DISCHARGE 12/27/2017 Karley Amaro, PT GP, CH 1         No intervention performed today    PT G-Codes  PT Professional Judgement Used?: Yes  Outcome Measure Options: BWAT (Parker[employer]i Wound Assess Tool)  Functional Limitation: Other PT primary  Other PT Primary Current Status (): 0 percent impaired, limited or restricted  Other PT Primary Goal Status (): 0 percent impaired, limited or restricted  Other PT Primary Discharge Status (): 0 percent impaired, limited or restricted     Karley Amaro, PT  12/27/2017

## 2018-03-14 ENCOUNTER — DOCUMENTATION (OUTPATIENT)
Dept: PHYSICAL THERAPY | Facility: HOSPITAL | Age: 83
End: 2018-03-14

## 2018-03-14 NOTE — THERAPY DISCHARGE NOTE
Outpatient Rehabilitation - Wound/Debridement Discharge Summary        Patient Name: Dacia Nelson  : 1925  MRN: [id number]  Today's Date: 3/14/2018                  Admit Date: (Not on file)    Visit Dx:  No diagnosis found.    Patient Active Problem List   Diagnosis   • Essential hypertension   • Irritable bowel syndrome with both constipation and diarrhea   • Primary osteoarthritis involving multiple joints        Past Medical History:   Diagnosis Date   • Arthritis    • Cellulitis         Past Surgical History:   Procedure Laterality Date   • HYSTERECTOMY           EVALUATION                WOUND DEBRIDEMENT                         Recommendation and Plan      Goals        PT OP Goals     Row Name 18 1529          PT Short Term Goals    STG 1 Pt and/or caregiver will verbalize s/sx of infection.  -     STG 1 Progress Met  -     STG 2 Pt will demonstrate 75% reduction in necrotic tissue to promote healing.  -     STG 2 Progress Met  -     STG 3 Pt will demonstrate 25% reduction in wound dimensions to indicate progress.  -     STG 3 Progress Met  -        Long Term Goals    LTG 1 Pt and/or caregivers will demonstrate/verbalize independence with home dressing changes.  -     LTG 1 Progress Met  -     LTG 2 Pt will demonstrate no remaining necrotic tissue.   -     LTG 2 Progress Met  -     LTG 3 Pt will demonstrate 75% reduction in wound dimensions to indicate progress.  -     LTG 3 Progress Met  -       User Key  (r) = Recorded By, (t) = Taken By, (c) = Cosigned By    Initials Name Provider Type    PINA Goldman, PT Physical Therapist          Time Calculation:          PT G-Codes  Functional Limitation: Other PT primary  Other PT Primary Goal Status (): 0 percent impaired, limited or restricted  Other PT Primary Discharge Status (): 0 percent impaired, limited or restricted           OP Discharge Summary     Row Name 18 1529             OP PT  Discharge Summary    Date of Discharge 03/14/18  -      Reason for Discharge All goals achieved  -      Outcomes Achieved Able to achieve all goals within established timeline  -      Discharge Destination Home without follow-up  -        User Key  (r) = Recorded By, (t) = Taken By, (c) = Cosigned By    Initials Name Provider Type     Melanie Goldman, PT Physical Therapist          Melanie Goldman, PT  3/14/2018

## 2018-05-02 ENCOUNTER — TELEPHONE (OUTPATIENT)
Dept: FAMILY MEDICINE CLINIC | Facility: CLINIC | Age: 83
End: 2018-05-02

## 2018-05-02 NOTE — TELEPHONE ENCOUNTER
----- Message from Kim Lund sent at 5/2/2018  1:23 PM EDT -----  [facility] requesting a refill on patients script Maxzide? I don't see a script for this particular medication. Can someone call the [facility] at [phone]..  ThanksKim..

## 2018-05-03 RX ORDER — TRIAMTERENE AND HYDROCHLOROTHIAZIDE 37.5; 25 MG/1; MG/1
1 TABLET ORAL DAILY
Qty: 90 TABLET | Refills: 3 | Status: SHIPPED | OUTPATIENT
Start: 2018-05-03 | End: 2018-06-18

## 2018-06-18 ENCOUNTER — OFFICE VISIT (OUTPATIENT)
Dept: FAMILY MEDICINE CLINIC | Facility: CLINIC | Age: 83
End: 2018-06-18

## 2018-06-18 ENCOUNTER — LAB (OUTPATIENT)
Dept: LAB | Facility: HOSPITAL | Age: 83
End: 2018-06-18

## 2018-06-18 VITALS
WEIGHT: 155.8 LBS | DIASTOLIC BLOOD PRESSURE: 68 MMHG | SYSTOLIC BLOOD PRESSURE: 126 MMHG | HEIGHT: 62 IN | RESPIRATION RATE: 18 BRPM | HEART RATE: 83 BPM | OXYGEN SATURATION: 98 % | BODY MASS INDEX: 28.67 KG/M2

## 2018-06-18 DIAGNOSIS — N39.0 URINARY TRACT INFECTION WITHOUT HEMATURIA, SITE UNSPECIFIED: ICD-10-CM

## 2018-06-18 DIAGNOSIS — R60.9 EDEMA, UNSPECIFIED TYPE: Primary | ICD-10-CM

## 2018-06-18 DIAGNOSIS — R60.9 EDEMA, UNSPECIFIED TYPE: ICD-10-CM

## 2018-06-18 LAB
ANION GAP SERPL CALCULATED.3IONS-SCNC: 14 MMOL/L (ref 3–11)
BUN BLD-MCNC: 25 MG/DL (ref 9–23)
BUN/CREAT SERPL: 21.2 (ref 7–25)
CALCIUM SPEC-SCNC: 9.2 MG/DL (ref 8.7–10.4)
CHLORIDE SERPL-SCNC: 100 MMOL/L (ref 99–109)
CO2 SERPL-SCNC: 25 MMOL/L (ref 20–31)
CREAT BLD-MCNC: 1.18 MG/DL (ref 0.6–1.3)
GFR SERPL CREATININE-BSD FRML MDRD: 43 ML/MIN/1.73
GLUCOSE BLD-MCNC: 86 MG/DL (ref 70–100)
POTASSIUM BLD-SCNC: 5.2 MMOL/L (ref 3.5–5.5)
SODIUM BLD-SCNC: 139 MMOL/L (ref 132–146)

## 2018-06-18 PROCEDURE — 80048 BASIC METABOLIC PNL TOTAL CA: CPT

## 2018-06-18 PROCEDURE — 36415 COLL VENOUS BLD VENIPUNCTURE: CPT

## 2018-06-18 PROCEDURE — 99213 OFFICE O/P EST LOW 20 MIN: CPT | Performed by: PHYSICIAN ASSISTANT

## 2018-06-18 RX ORDER — FUROSEMIDE 20 MG/1
20 TABLET ORAL DAILY
Qty: 14 TABLET | Refills: 1 | Status: SHIPPED | OUTPATIENT
Start: 2018-06-18 | End: 2018-08-13

## 2018-06-18 RX ORDER — DIPHENOXYLATE HYDROCHLORIDE AND ATROPINE SULFATE 2.5; .025 MG/1; MG/1
1 TABLET ORAL 4 TIMES DAILY PRN
COMMUNITY
End: 2018-08-13

## 2018-06-18 RX ORDER — NITROFURANTOIN 25; 75 MG/1; MG/1
100 CAPSULE ORAL 2 TIMES DAILY
Qty: 14 CAPSULE | Refills: 0 | Status: SHIPPED | OUTPATIENT
Start: 2018-06-18 | End: 2018-06-27

## 2018-06-18 NOTE — PROGRESS NOTES
Subjective   Dacia Nelson is a 89 y.o. female  Foot Swelling (x2 months. Legs are red, swollen, no pain. Was on Maxzide but stopped taking it because it wasn't working. )      Leg Swelling   This is a recurrent problem. The current episode started 1 to 4 weeks ago. The problem has been unchanged. Pertinent negatives include no chest pain, coughing, diaphoresis, fatigue, headaches, nausea, numbness, rash, vomiting or weakness. Associated symptoms comments: Leg swelling, D/C maxzide .   Urinary Tract Infection    This is a new problem. The current episode started in the past 7 days. The problem occurs intermittently. The problem has been waxing and waning. The quality of the pain is described as burning. The pain is at a severity of 1/10. The patient is experiencing no pain. There has been no fever. The fever has been present for less than 1 day. There is a history of pyelonephritis. Pertinent negatives include no nausea or vomiting. She has tried nothing for the symptoms.   legs are not painful     The following portions of the patient's history were reviewed and updated as appropriate: allergies, current medications, past social history and problem list    Review of Systems   Constitutional: Negative for appetite change, diaphoresis, fatigue and unexpected weight change.   Eyes: Negative for visual disturbance.   Respiratory: Negative for cough, chest tightness and shortness of breath.    Cardiovascular: Negative for chest pain, palpitations and leg swelling.   Gastrointestinal: Negative for diarrhea, nausea and vomiting.   Endocrine: Negative for polydipsia, polyphagia and polyuria.   Skin: Negative for color change and rash.   Neurological: Negative for dizziness, syncope, weakness, light-headedness, numbness and headaches.       Objective     Vitals:    06/18/18 1356   BP: 126/68   Pulse: 83   Resp: 18   SpO2: 98%       Physical Exam   Constitutional: She appears well-developed and well-nourished.   Neck: No JVD  present.   Cardiovascular: Normal rate, regular rhythm, normal heart sounds, intact distal pulses and normal pulses.    No murmur heard.  Pulmonary/Chest: Effort normal and breath sounds normal. No respiratory distress.   Abdominal: Soft. Bowel sounds are normal. There is no hepatosplenomegaly. There is no tenderness.   Musculoskeletal: She exhibits no edema.   Skin: Skin is warm and dry.   Nursing note and vitals reviewed.      Assessment/Plan     Diagnoses and all orders for this visit:    Edema, unspecified type  -     furosemide (LASIX) 20 MG tablet; Take 1 tablet by mouth Daily.  -     Basic metabolic panel; Future    Urinary tract infection without hematuria, site unspecified  -     nitrofurantoin, macrocrystal-monohydrate, (MACROBID) 100 MG capsule; Take 1 capsule by mouth 2 (Two) Times a Day.    Other orders  -     diphenoxylate-atropine (LOMOTIL) 2.5-0.025 MG per tablet; Take 1 tablet by mouth 4 (Four) Times a Day As Needed for Diarrhea.

## 2018-06-27 ENCOUNTER — OFFICE VISIT (OUTPATIENT)
Dept: FAMILY MEDICINE CLINIC | Facility: CLINIC | Age: 83
End: 2018-06-27

## 2018-06-27 ENCOUNTER — TELEPHONE (OUTPATIENT)
Dept: FAMILY MEDICINE CLINIC | Facility: CLINIC | Age: 83
End: 2018-06-27

## 2018-06-27 VITALS
RESPIRATION RATE: 14 BRPM | OXYGEN SATURATION: 90 % | HEIGHT: 62 IN | WEIGHT: 155 LBS | DIASTOLIC BLOOD PRESSURE: 64 MMHG | HEART RATE: 81 BPM | BODY MASS INDEX: 28.52 KG/M2 | SYSTOLIC BLOOD PRESSURE: 122 MMHG

## 2018-06-27 DIAGNOSIS — N39.0 URINARY TRACT INFECTION WITHOUT HEMATURIA, SITE UNSPECIFIED: Primary | ICD-10-CM

## 2018-06-27 DIAGNOSIS — R60.9 EDEMA, UNSPECIFIED TYPE: ICD-10-CM

## 2018-06-27 DIAGNOSIS — N39.0 URINARY TRACT INFECTION WITHOUT HEMATURIA, SITE UNSPECIFIED: ICD-10-CM

## 2018-06-27 LAB
BILIRUB BLD-MCNC: NEGATIVE MG/DL
CLARITY, POC: ABNORMAL
COLOR UR: YELLOW
GLUCOSE UR STRIP-MCNC: NEGATIVE MG/DL
KETONES UR QL: NEGATIVE
LEUKOCYTE EST, POC: ABNORMAL
NITRITE UR-MCNC: NEGATIVE MG/ML
PH UR: 5 [PH] (ref 5–8)
PROT UR STRIP-MCNC: NEGATIVE MG/DL
RBC # UR STRIP: NEGATIVE /UL
SP GR UR: 1 (ref 1–1.03)
UROBILINOGEN UR QL: NORMAL

## 2018-06-27 PROCEDURE — 81003 URINALYSIS AUTO W/O SCOPE: CPT | Performed by: PHYSICIAN ASSISTANT

## 2018-06-27 PROCEDURE — 99213 OFFICE O/P EST LOW 20 MIN: CPT | Performed by: PHYSICIAN ASSISTANT

## 2018-06-27 RX ORDER — FUROSEMIDE 20 MG/1
20 TABLET ORAL DAILY
Qty: 30 TABLET | Refills: 11 | Status: SHIPPED | OUTPATIENT
Start: 2018-06-27 | End: 2018-08-13

## 2018-06-27 RX ORDER — POTASSIUM CHLORIDE 750 MG/1
10 TABLET, FILM COATED, EXTENDED RELEASE ORAL DAILY
Qty: 30 TABLET | Refills: 11 | Status: SHIPPED | OUTPATIENT
Start: 2018-06-27 | End: 2018-08-13

## 2018-06-27 RX ORDER — CIPROFLOXACIN 500 MG/1
500 TABLET, FILM COATED ORAL EVERY 12 HOURS SCHEDULED
Qty: 10 TABLET | Refills: 0 | Status: SHIPPED | OUTPATIENT
Start: 2018-06-27 | End: 2018-06-27 | Stop reason: SDUPTHER

## 2018-06-27 RX ORDER — CIPROFLOXACIN 500 MG/1
500 TABLET, FILM COATED ORAL EVERY 12 HOURS SCHEDULED
Qty: 10 TABLET | Refills: 0 | Status: SHIPPED | OUTPATIENT
Start: 2018-06-27 | End: 2018-08-13

## 2018-06-27 RX ORDER — POTASSIUM CHLORIDE 750 MG/1
10 TABLET, FILM COATED, EXTENDED RELEASE ORAL DAILY
Qty: 30 TABLET | Refills: 11 | Status: SHIPPED | OUTPATIENT
Start: 2018-06-27 | End: 2018-06-27 | Stop reason: SDUPTHER

## 2018-06-27 RX ORDER — FUROSEMIDE 20 MG/1
20 TABLET ORAL DAILY
Qty: 30 TABLET | Refills: 11 | Status: SHIPPED | OUTPATIENT
Start: 2018-06-27 | End: 2018-06-27 | Stop reason: SDUPTHER

## 2018-06-27 NOTE — TELEPHONE ENCOUNTER
----- Message from Elvia Jaime sent at 6/27/2018  3:27 PM EDT -----  Contact: [facility], [phone]  PT. SAW [facility] EARLI[facility].  PT WAS TOLD THAT 3 RX'S WERE TO BE DELIVERED TO PT., YET RX DID NOT RECEIVE ANY TODAY.  CONTACT LENKA @ [facility].

## 2018-06-27 NOTE — PROGRESS NOTES
Subjective   Dacia Nelson is a 89 y.o. female  Edema (Swelling has gone down since starting Lasix one week ago) and Urinary Tract Infection (Finished Macrobid which caused diarrhea. Wants to try a different a/b)      History of Present Illness   patient pleasant  89-year-old white female who comes in for follow-up of UTI , some dysuria , no fever or chills no flank pain but is still having ome dysuria.  pient states that she's doing much better on the Lasix feels better , less swelling improved    The following portions of the patient's history were reviewed and updated as appropriate: allergies, current medications, past social history and problem list    Review of Systems   Constitutional: Negative for appetite change, diaphoresis, fatigue and unexpected weight change.   Eyes: Negative for visual disturbance.   Respiratory: Negative for cough, chest tightness and shortness of breath.    Cardiovascular: Negative for chest pain, palpitations and leg swelling.   Gastrointestinal: Negative for diarrhea, nausea and vomiting.   Endocrine: Negative for polydipsia, polyphagia and polyuria.   Genitourinary: Positive for frequency and urgency.   Skin: Negative for color change and rash.   Neurological: Negative for dizziness, syncope, weakness, light-headedness, numbness and headaches.       Objective     Vitals:    06/27/18 1132   BP: 122/64   Pulse: 81   Resp: 14   SpO2: 90%       Physical Exam   Constitutional: She appears well-developed and well-nourished.   Neck: Neck supple. No JVD present. No thyromegaly present.   Cardiovascular: Normal rate, regular rhythm, normal heart sounds, intact distal pulses and normal pulses.    No murmur heard.  Pulmonary/Chest: Effort normal and breath sounds normal. No respiratory distress.   Abdominal: Soft. Bowel sounds are normal. There is no hepatosplenomegaly. There is no tenderness.   Musculoskeletal: She exhibits no edema.   Lymphadenopathy:     She has no cervical adenopathy.    Neurological: No sensory deficit.   Skin: Skin is warm and dry. She is not diaphoretic.   Nursing note and vitals reviewed.      Assessment/Plan     Diagnoses and all orders for this visit:    Urinary tract infection without hematuria, site unspecified  -     POCT urinalysis dipstick, automated  -     Discontinue: ciprofloxacin (CIPRO) 500 MG tablet; Take 1 tablet by mouth Every 12 (Twelve) Hours.    Edema, unspecified type    Other orders  -     Discontinue: furosemide (LASIX) 20 MG tablet; Take 1 tablet by mouth Daily.  -     Discontinue: potassium chloride (K-DUR) 10 MEQ CR tablet; Take 1 tablet by mouth Daily.    #1 continue Lasix 20 mg 1 by mouth dispensed 30 everyday at potassium 10 mEq dispense 30 everyday

## 2018-06-28 ENCOUNTER — TELEPHONE (OUTPATIENT)
Dept: FAMILY MEDICINE CLINIC | Facility: CLINIC | Age: 83
End: 2018-06-28

## 2018-06-28 RX ORDER — DOXYCYCLINE HYCLATE 100 MG/1
100 CAPSULE ORAL 2 TIMES DAILY
Qty: 28 CAPSULE | Refills: 0 | Status: SHIPPED | OUTPATIENT
Start: 2018-06-28 | End: 2018-08-13

## 2018-06-28 RX ORDER — DOXYCYCLINE HYCLATE 100 MG/1
100 CAPSULE ORAL 2 TIMES DAILY
Qty: 28 CAPSULE | Refills: 0 | Status: SHIPPED | OUTPATIENT
Start: 2018-06-28 | End: 2018-06-28 | Stop reason: SDUPTHER

## 2018-06-28 NOTE — TELEPHONE ENCOUNTER
----- Message from Elvia Jaime sent at 6/28/2018  9:42 AM EDT -----  Contact: PT.  PT. SAW TONYA.  PT. RECEIVED RX YESTERDAY, RE. CIPRO.  PT. HAS CONCERNS ABOUT TAKING THIS MED'S.  PT. WAS SHOWING SIGNS OF TENDINITIS.  PT. WONDERING IF ABLE TO PRESCRIBE ANOTHER MED. FOR HER?  [facility] [place], [place].  PT. CAN BE REACHED @ [facility].

## 2018-07-16 ENCOUNTER — TELEPHONE (OUTPATIENT)
Dept: FAMILY MEDICINE CLINIC | Facility: CLINIC | Age: 83
End: 2018-07-16

## 2018-07-16 NOTE — TELEPHONE ENCOUNTER
----- Message from Kim Lund sent at 7/16/2018  3:52 PM EDT -----  Patient called today and said that she has been taking furosemide (LASIX) 20 MG tablet once daily and it is making her very nauseous. She said the edema in her legs/ankles is much better and wants to know if she can stop the lasix. Please call patient at [phone]..  ThanksKim..

## 2018-08-13 ENCOUNTER — HOSPITAL ENCOUNTER (EMERGENCY)
Facility: HOSPITAL | Age: 83
Discharge: HOME | End: 2018-08-13
Attending: EMERGENCY MEDICINE | Admitting: EMERGENCY MEDICINE

## 2018-08-13 ENCOUNTER — APPOINTMENT (OUTPATIENT)
Dept: GENERAL RADIOLOGY | Facility: HOSPITAL | Age: 83
End: 2018-08-13

## 2018-08-13 VITALS
HEART RATE: 83 BPM | SYSTOLIC BLOOD PRESSURE: 97 MMHG | RESPIRATION RATE: 18 BRPM | BODY MASS INDEX: 28.32 KG/M2 | HEIGHT: 61 IN | DIASTOLIC BLOOD PRESSURE: 87 MMHG | TEMPERATURE: 98.1 F | WEIGHT: 150 LBS | OXYGEN SATURATION: 97 %

## 2018-08-13 DIAGNOSIS — R11.0 NAUSEA: ICD-10-CM

## 2018-08-13 DIAGNOSIS — R60.9 PERIPHERAL EDEMA: ICD-10-CM

## 2018-08-13 DIAGNOSIS — R10.13 EPIGASTRIC ABDOMINAL PAIN: ICD-10-CM

## 2018-08-13 DIAGNOSIS — R07.2 SUBSTERNAL CHEST PAIN: Primary | ICD-10-CM

## 2018-08-13 LAB
ALBUMIN SERPL-MCNC: 3.2 G/DL (ref 3.2–4.8)
ALBUMIN/GLOB SERPL: 0.9 G/DL (ref 1.5–2.5)
ALP SERPL-CCNC: 133 U/L (ref 25–100)
ALT SERPL W P-5'-P-CCNC: 27 U/L (ref 7–40)
ANION GAP SERPL CALCULATED.3IONS-SCNC: 10 MMOL/L (ref 3–11)
AST SERPL-CCNC: 39 U/L (ref 0–33)
BASOPHILS # BLD AUTO: 0.03 10*3/MM3 (ref 0–0.2)
BASOPHILS NFR BLD AUTO: 0.5 % (ref 0–1)
BILIRUB SERPL-MCNC: 1.8 MG/DL (ref 0.3–1.2)
BNP SERPL-MCNC: 149 PG/ML (ref 0–100)
BUN BLD-MCNC: 15 MG/DL (ref 9–23)
BUN/CREAT SERPL: 13.4 (ref 7–25)
CALCIUM SPEC-SCNC: 8.6 MG/DL (ref 8.7–10.4)
CHLORIDE SERPL-SCNC: 97 MMOL/L (ref 99–109)
CO2 SERPL-SCNC: 23 MMOL/L (ref 20–31)
CREAT BLD-MCNC: 1.12 MG/DL (ref 0.6–1.3)
DEPRECATED RDW RBC AUTO: 48.9 FL (ref 37–54)
EOSINOPHIL # BLD AUTO: 0.02 10*3/MM3 (ref 0–0.3)
EOSINOPHIL NFR BLD AUTO: 0.4 % (ref 0–3)
ERYTHROCYTE [DISTWIDTH] IN BLOOD BY AUTOMATED COUNT: 13.6 % (ref 11.3–14.5)
GFR SERPL CREATININE-BSD FRML MDRD: 45 ML/MIN/1.73
GLOBULIN UR ELPH-MCNC: 3.5 GM/DL
GLUCOSE BLD-MCNC: 98 MG/DL (ref 70–100)
HCT VFR BLD AUTO: 35 % (ref 34.5–44)
HGB BLD-MCNC: 11.8 G/DL (ref 11.5–15.5)
HOLD SPECIMEN: NORMAL
HOLD SPECIMEN: NORMAL
IMM GRANULOCYTES # BLD: 0.01 10*3/MM3 (ref 0–0.03)
IMM GRANULOCYTES NFR BLD: 0.2 % (ref 0–0.6)
LIPASE SERPL-CCNC: 30 U/L (ref 6–51)
LYMPHOCYTES # BLD AUTO: 0.64 10*3/MM3 (ref 0.6–4.8)
LYMPHOCYTES NFR BLD AUTO: 11.5 % (ref 24–44)
MCH RBC QN AUTO: 33.9 PG (ref 27–31)
MCHC RBC AUTO-ENTMCNC: 33.7 G/DL (ref 32–36)
MCV RBC AUTO: 100.6 FL (ref 80–99)
MONOCYTES # BLD AUTO: 0.56 10*3/MM3 (ref 0–1)
MONOCYTES NFR BLD AUTO: 10.1 % (ref 0–12)
NEUTROPHILS # BLD AUTO: 4.31 10*3/MM3 (ref 1.5–8.3)
NEUTROPHILS NFR BLD AUTO: 77.3 % (ref 41–71)
PLATELET # BLD AUTO: 170 10*3/MM3 (ref 150–450)
PMV BLD AUTO: 9.2 FL (ref 6–12)
POTASSIUM BLD-SCNC: 4.8 MMOL/L (ref 3.5–5.5)
PROT SERPL-MCNC: 6.7 G/DL (ref 5.7–8.2)
RBC # BLD AUTO: 3.48 10*6/MM3 (ref 3.89–5.14)
SODIUM BLD-SCNC: 130 MMOL/L (ref 132–146)
TROPONIN I SERPL-MCNC: 0 NG/ML (ref 0–0.07)
TROPONIN I SERPL-MCNC: 0 NG/ML (ref 0–0.07)
WBC NRBC COR # BLD: 5.57 10*3/MM3 (ref 3.5–10.8)
WHOLE BLOOD HOLD SPECIMEN: NORMAL
WHOLE BLOOD HOLD SPECIMEN: NORMAL

## 2018-08-13 PROCEDURE — 96374 THER/PROPH/DIAG INJ IV PUSH: CPT

## 2018-08-13 PROCEDURE — 85025 COMPLETE CBC W/AUTO DIFF WBC: CPT | Performed by: EMERGENCY MEDICINE

## 2018-08-13 PROCEDURE — 93005 ELECTROCARDIOGRAM TRACING: CPT | Performed by: EMERGENCY MEDICINE

## 2018-08-13 PROCEDURE — 80053 COMPREHEN METABOLIC PANEL: CPT | Performed by: EMERGENCY MEDICINE

## 2018-08-13 PROCEDURE — 99285 EMERGENCY DEPT VISIT HI MDM: CPT

## 2018-08-13 PROCEDURE — 71045 X-RAY EXAM CHEST 1 VIEW: CPT

## 2018-08-13 PROCEDURE — 83690 ASSAY OF LIPASE: CPT | Performed by: EMERGENCY MEDICINE

## 2018-08-13 PROCEDURE — 83880 ASSAY OF NATRIURETIC PEPTIDE: CPT | Performed by: EMERGENCY MEDICINE

## 2018-08-13 PROCEDURE — 84484 ASSAY OF TROPONIN QUANT: CPT

## 2018-08-13 RX ORDER — RANITIDINE 150 MG/1
150 TABLET ORAL 2 TIMES DAILY
Qty: 28 TABLET | Refills: 0 | Status: SHIPPED | OUTPATIENT
Start: 2018-08-13 | End: 2018-08-21 | Stop reason: SDUPTHER

## 2018-08-13 RX ORDER — SODIUM CHLORIDE 0.9 % (FLUSH) 0.9 %
10 SYRINGE (ML) INJECTION AS NEEDED
Status: DISCONTINUED | OUTPATIENT
Start: 2018-08-13 | End: 2018-08-13 | Stop reason: HOSPADM

## 2018-08-13 RX ORDER — BUMETANIDE 0.25 MG/ML
0.5 INJECTION INTRAMUSCULAR; INTRAVENOUS ONCE
Status: COMPLETED | OUTPATIENT
Start: 2018-08-13 | End: 2018-08-13

## 2018-08-13 RX ORDER — ASPIRIN 81 MG/1
324 TABLET, CHEWABLE ORAL ONCE
Status: COMPLETED | OUTPATIENT
Start: 2018-08-13 | End: 2018-08-13

## 2018-08-13 RX ORDER — ONDANSETRON 8 MG/1
8 TABLET, ORALLY DISINTEGRATING ORAL EVERY 8 HOURS PRN
Qty: 15 TABLET | Refills: 0 | Status: SHIPPED | OUTPATIENT
Start: 2018-08-13 | End: 2018-08-27 | Stop reason: SDUPTHER

## 2018-08-13 RX ADMIN — ASPIRIN 81 MG 324 MG: 81 TABLET ORAL at 11:08

## 2018-08-13 RX ADMIN — NITROGLYCERIN 1 INCH: 20 OINTMENT TOPICAL at 11:08

## 2018-08-13 RX ADMIN — BUMETANIDE 0.5 MG: 0.25 INJECTION INTRAMUSCULAR; INTRAVENOUS at 12:35

## 2018-08-13 NOTE — ED PROVIDER NOTES
Subjective   Dacia Nelson is a 89 y.o.female who presents to the ED with complaints of left chest pain. The patient reports her chest pain has been intermittent for one week. She describes her pain as a tight, heaviness. She rates it as a 10/10 at its worst, but is currently a 4/10. She has not taken any medication for her pain. She also complains of leg swelling, nausea, increased frequency of urination, intermittent flank pain, and fatigue, but denies any shortness of breath, fever, chills, diaphoresis, or vomiting. She has diagnosed with a UTI one month ago, and states her leg swelling developed after her diagnosis. During this time, she was instructed to discontinue her Lasix because it was causing her to experience nausea. She reports she has had several UTI's in the past. She denies any history of HTN, CAD, or HLD. There are no other complaints at this time.         History provided by:  Patient  Chest Pain   Pain location:  L chest  Pain quality: pressure and tightness    Pain radiates to:  Does not radiate  Pain severity:  Moderate (4/10)  Onset quality:  Sudden  Duration:  1 week  Timing:  Intermittent  Progression:  Waxing and waning  Chronicity:  New  Relieved by:  Nothing  Worsened by:  Nothing  Ineffective treatments:  None tried  Associated symptoms: fatigue, lower extremity edema and nausea    Associated symptoms: no diaphoresis, no fever, no shortness of breath and no vomiting    Risk factors: no coronary artery disease, no high cholesterol and no hypertension        Review of Systems   Constitutional: Positive for fatigue. Negative for chills, diaphoresis and fever.   Respiratory: Negative for shortness of breath.    Cardiovascular: Positive for chest pain and leg swelling.   Gastrointestinal: Positive for nausea. Negative for vomiting.   Genitourinary: Positive for flank pain and frequency.   All other systems reviewed and are negative.      Past Medical History:   Diagnosis Date   • Arthritis    •  Cellulitis        Allergies   Allergen Reactions   • Eggs Or Egg-Derived Products Hives   • Penicillins    • Sulfa Antibiotics        Past Surgical History:   Procedure Laterality Date   • HYSTERECTOMY         History reviewed. No pertinent family history.    Social History     Social History   • Marital status:      Social History Main Topics   • Smoking status: Never Smoker   • Smokeless tobacco: Never Used   • Alcohol use No   • Drug use: No   • Sexual activity: Defer     Other Topics Concern   • Not on file         Objective   Physical Exam   Constitutional: She is oriented to person, place, and time. She appears well-developed and well-nourished. No distress.   HENT:   Head: Normocephalic and atraumatic.   Nose: Nose normal.   Eyes: Conjunctivae are normal. No scleral icterus.   Neck: Normal range of motion. Neck supple.   Cardiovascular: Normal rate, regular rhythm and normal heart sounds.    No murmur heard.  Pulmonary/Chest: Effort normal and breath sounds normal. No respiratory distress.   Abdominal: Soft. Bowel sounds are normal. There is tenderness. There is guarding.   Mild epigastric ttp with some guarding   Musculoskeletal: Normal range of motion. She exhibits edema.   1+ pitting edema to bilateral LE   Neurological: She is alert and oriented to person, place, and time.   Skin: Skin is warm and dry.   Psychiatric: She has a normal mood and affect. Her behavior is normal.   Nursing note and vitals reviewed.      Procedures         ED Course  ED Course as of Aug 13 2125   Mon Aug 13, 2018   1108 We talked with the patient's daughter who states the patient has had some sort of symptoms since her   around a month ago.  The daughters convinced the patient is convinced that she has some sort of terminal condition like her .  [RS]   1159 Troponin I: 0.00 [RS]   1159 BNP: (!) 149.0 [RS]   1202 Dr. Couch is bedside with the patient discussing the lab results and treatment plan.   [TB]    1319 Troponin I: 0.00 [RS]   1320 The patient has 2 sets of negative cardiac enzymes.  At this point there is no indication for admission to the hospital.  We will plan discharge to have her follow-up with the chest pain clinic for further evaluation and management.  She voices understanding and agrees.  [RS]   1323 I talked with the patient and her daughter about the follow-up.  They advised that they do not want to give any specialist at this time secondary to the patient's age.  They report they only want to follow-up with primary care.  I think that is reasonable.  We'll discharge the patient home to follow-up with primary care as soon as possible.  [RS]      ED Course User Index  [RS] Augustin Couch MD  [TB] Wilfred Grimaldo     Recent Results (from the past 24 hour(s))   Comprehensive Metabolic Panel    Collection Time: 08/13/18 10:55 AM   Result Value Ref Range    Glucose 98 70 - 100 mg/dL    BUN 15 9 - 23 mg/dL    Creatinine 1.12 0.60 - 1.30 mg/dL    Sodium 130 (L) 132 - 146 mmol/L    Potassium 4.8 3.5 - 5.5 mmol/L    Chloride 97 (L) 99 - 109 mmol/L    CO2 23.0 20.0 - 31.0 mmol/L    Calcium 8.6 (L) 8.7 - 10.4 mg/dL    Total Protein 6.7 5.7 - 8.2 g/dL    Albumin 3.20 3.20 - 4.80 g/dL    ALT (SGPT) 27 7 - 40 U/L    AST (SGOT) 39 (H) 0 - 33 U/L    Alkaline Phosphatase 133 (H) 25 - 100 U/L    Total Bilirubin 1.8 (H) 0.3 - 1.2 mg/dL    eGFR Non African Amer 45 (L) >60 mL/min/1.73    Globulin 3.5 gm/dL    A/G Ratio 0.9 (L) 1.5 - 2.5 g/dL    BUN/Creatinine Ratio 13.4 7.0 - 25.0    Anion Gap 10.0 3.0 - 11.0 mmol/L   Lipase    Collection Time: 08/13/18 10:55 AM   Result Value Ref Range    Lipase 30 6 - 51 U/L   BNP    Collection Time: 08/13/18 10:55 AM   Result Value Ref Range    .0 (H) 0.0 - 100.0 pg/mL   Light Blue Top    Collection Time: 08/13/18 10:55 AM   Result Value Ref Range    Extra Tube hold for add-on    Green Top (Gel)    Collection Time: 08/13/18 10:55 AM   Result Value Ref Range    Extra  Tube Hold for add-ons.    Lavender Top    Collection Time: 08/13/18 10:55 AM   Result Value Ref Range    Extra Tube hold for add-on    Gold Top - SST    Collection Time: 08/13/18 10:55 AM   Result Value Ref Range    Extra Tube Hold for add-ons.    CBC Auto Differential    Collection Time: 08/13/18 10:55 AM   Result Value Ref Range    WBC 5.57 3.50 - 10.80 10*3/mm3    RBC 3.48 (L) 3.89 - 5.14 10*6/mm3    Hemoglobin 11.8 11.5 - 15.5 g/dL    Hematocrit 35.0 34.5 - 44.0 %    .6 (H) 80.0 - 99.0 fL    MCH 33.9 (H) 27.0 - 31.0 pg    MCHC 33.7 32.0 - 36.0 g/dL    RDW 13.6 11.3 - 14.5 %    RDW-SD 48.9 37.0 - 54.0 fl    MPV 9.2 6.0 - 12.0 fL    Platelets 170 150 - 450 10*3/mm3    Neutrophil % 77.3 (H) 41.0 - 71.0 %    Lymphocyte % 11.5 (L) 24.0 - 44.0 %    Monocyte % 10.1 0.0 - 12.0 %    Eosinophil % 0.4 0.0 - 3.0 %    Basophil % 0.5 0.0 - 1.0 %    Immature Grans % 0.2 0.0 - 0.6 %    Neutrophils, Absolute 4.31 1.50 - 8.30 10*3/mm3    Lymphocytes, Absolute 0.64 0.60 - 4.80 10*3/mm3    Monocytes, Absolute 0.56 0.00 - 1.00 10*3/mm3    Eosinophils, Absolute 0.02 0.00 - 0.30 10*3/mm3    Basophils, Absolute 0.03 0.00 - 0.20 10*3/mm3    Immature Grans, Absolute 0.01 0.00 - 0.03 10*3/mm3   POC Troponin, Rapid    Collection Time: 08/13/18 11:30 AM   Result Value Ref Range    Troponin I 0.00 0.00 - 0.07 ng/mL   POC Troponin, Rapid    Collection Time: 08/13/18 12:47 PM   Result Value Ref Range    Troponin I 0.00 0.00 - 0.07 ng/mL     Note: In addition to lab results from this visit, the labs listed above may include labs taken at another facility or during a different encounter within the last 24 hours. Please correlate lab times with ED admission and discharge times for further clarification of the services performed during this visit.    XR Chest 1 View   Final Result   Poor inspiratory effort; no active disease.       D:  08/13/2018   E:  08/13/2018       This report was finalized on 8/13/2018 1:51 PM by Dr. Wild Hill MD.             Vitals:    08/13/18 1215 08/13/18 1230 08/13/18 1235 08/13/18 1315   BP: 120/55 117/56 117/56 97/87   BP Location:       Patient Position:       Pulse: 78 80 80 83   Resp:       Temp:       TempSrc:       SpO2: 96% 95%  97%   Weight:       Height:         Medications   aspirin chewable tablet 324 mg (324 mg Oral Given 8/13/18 1108)   nitroglycerin (NITROSTAT) ointment 1 inch (1 inch Topical Given 8/13/18 1108)   bumetanide (BUMEX) injection 0.5 mg (0.5 mg Intravenous Given 8/13/18 1235)     ECG/EMG Results (last 24 hours)     Procedure Component Value Units Date/Time    ECG 12 Lead [[id number]] Collected:  08/13/18 1040     Updated:  08/13/18 1043                  HEART Score (for prediction of 6-week risk of major adverse cardiac event) reviewed and/or performed as part of the patient evaluation and treatment planning process.  The result associated with this review/performance is: 3           MDM  Number of Diagnoses or Management Options  Epigastric abdominal pain:   Nausea:   Peripheral edema:   Substernal chest pain:   Diagnosis management comments: ECG/EMG Results (last 24 hours)     Procedure Component Value Units Date/Time    ECG 12 Lead ([id number]) Collected:  08/13/18 1040     Updated:  08/13/18 1248    Narrative:       Test Reason : chest pain  Blood Pressure : **/** mmHG  Vent. Rate : 075 BPM     Atrial Rate : 075 BPM     P-R Int : 140 ms          QRS Dur : 080 ms      QT Int : 400 ms       P-R-T Axes : 018 -04 002 degrees     QTc Int : 446 ms    Normal sinus rhythm  Low voltage QRS  Nonspecific ST and T wave abnormality  Abnormal ECG  No previous ECGs available  Confirmed by ALDA MARRERO MD ([id number]) on 8/13/2018 12:47:59 PM    Referred By:  JANES           Confirmed By:ALDA MARRERO MD    ECG 12 Lead ([id number]) Collected:  08/13/18 1256     Updated:  08/13/18 1328    Narrative:       Test Reason : chest pain  Blood Pressure : **/** mmHG  Vent. Rate : 082 BPM     Atrial Rate : 082 BPM     P-R  Int : 136 ms          QRS Dur : 082 ms      QT Int : 410 ms       P-R-T Axes : 051 013 057 degrees     QTc Int : 479 ms    Normal sinus rhythm  Low voltage QRS  Nonspecific ST and T wave abnormality  Prolonged QT  Abnormal ECG  When compared with ECG of 13-AUG-2018 10:40,  Nonspecific T wave abnormality now evident in Anterior leads  Confirmed by ALDA COUCH MD ([id number]) on 8/13/2018 1:27:56 PM    Referred By:  JANES           Confirmed By:ALDA COUCH MD             Amount and/or Complexity of Data Reviewed  Clinical lab tests: reviewed  Tests in the radiology section of CPT®: reviewed  Decide to obtain previous medical records or to obtain history from someone other than the patient: yes  Obtain history from someone other than the patient: yes  Independent visualization of images, tracings, or specimens: yes        Final diagnoses:   Substernal chest pain   Epigastric abdominal pain   Peripheral edema   Nausea       Documentation assistance provided by alexandra Grimaldo.  Information recorded by the scribe was done at my direction and has been verified and validated by me.     Wilfred Grimaldo  08/13/18 1050       Wilfred Grimaldo  08/13/18 1052       Wilfred Grimaldo  08/13/18 1325       Alda Couch MD  08/13/18 5030

## 2018-08-21 ENCOUNTER — OFFICE VISIT (OUTPATIENT)
Dept: FAMILY MEDICINE CLINIC | Facility: CLINIC | Age: 83
End: 2018-08-21

## 2018-08-21 VITALS
DIASTOLIC BLOOD PRESSURE: 62 MMHG | OXYGEN SATURATION: 98 % | WEIGHT: 150 LBS | TEMPERATURE: 97.7 F | SYSTOLIC BLOOD PRESSURE: 108 MMHG | RESPIRATION RATE: 16 BRPM | BODY MASS INDEX: 28.32 KG/M2 | HEART RATE: 73 BPM | HEIGHT: 61 IN

## 2018-08-21 DIAGNOSIS — Z00.00 MEDICARE ANNUAL WELLNESS VISIT, SUBSEQUENT: Primary | ICD-10-CM

## 2018-08-21 PROCEDURE — G0439 PPPS, SUBSEQ VISIT: HCPCS | Performed by: PHYSICIAN ASSISTANT

## 2018-08-21 RX ORDER — RANITIDINE 150 MG/1
150 TABLET ORAL 2 TIMES DAILY
Qty: 60 TABLET | Refills: 11 | Status: SHIPPED | OUTPATIENT
Start: 2018-08-21 | End: 2018-09-25 | Stop reason: HOSPADM

## 2018-08-21 RX ORDER — RANITIDINE 150 MG/1
150 TABLET ORAL 2 TIMES DAILY
Qty: 60 TABLET | Refills: 11 | Status: SHIPPED | OUTPATIENT
Start: 2018-08-21 | End: 2018-08-21 | Stop reason: SDUPTHER

## 2018-08-21 NOTE — PROGRESS NOTES
QUICK REFERENCE INFORMATION:  The ABCs of the Annual Wellness Visit    Subsequent Medicare Wellness Visit    HEALTH RISK ASSESSMENT    11/24/1925    Recent Hospitalizations:  No hospitalization(s) within the last year..        Current Medical Providers:  Patient Care Team:  Momo Breaux PA as PCP - General (Family Medicine)  Kofi Lewis MD as PCP - Claims Attributed  Pratima Cain RN as Care Coordinator (Population Health)        Smoking Status:  History   Smoking Status   • Never Smoker   Smokeless Tobacco   • Never Used       Alcohol Consumption:  History   Alcohol Use No       Depression Screen:   PHQ-2/PHQ-9 Depression Screening 8/21/2018   Little interest or pleasure in doing things 0   Feeling down, depressed, or hopeless 0   Total Score 0       Health Habits and Functional and Cognitive Screening:  Functional & Cognitive Status 8/21/2018   Do you have difficulty preparing food and eating? No   Do you have difficulty bathing yourself, getting dressed or grooming yourself? No   Do you have difficulty using the toilet? No   Do you have difficulty moving around from place to place? Yes   Do you have trouble with steps or getting out of a bed or a chair? Yes   In the past year have you fallen or experienced a near fall? No   Do you need help using the phone?  No   Are you deaf or do you have serious difficulty hearing?  Yes   Do you need help with transportation? No   Do you need help shopping? No   Do you need help preparing meals?  No   Do you need help with housework?  No   Do you need help with laundry? No   Do you need help taking your medications? No   Do you need help managing money? No   Do you ever drive or ride in a car without wearing a seat belt? No   Have you felt unusual stress, anger or loneliness in the last month? Yes   Who do you live with? Alone   If you need help, do you have trouble finding someone available to you? No   Have you been bothered in the last four  weeks by sexual problems? No   Do you have difficulty concentrating, remembering or making decisions? No           Does the patient have evidence of cognitive impairment? Yes    Aspirin use counseling: Taking ASA appropriately as indicated      Recent Lab Results:  CMP:  Lab Results   Component Value Date    BUN 15 08/13/2018    CREATININE 1.12 08/13/2018    EGFRIFNONA 45 (L) 08/13/2018    BCR 13.4 08/13/2018     (L) 08/13/2018    K 4.8 08/13/2018    CO2 23.0 08/13/2018    CALCIUM 8.6 (L) 08/13/2018    ALBUMIN 3.20 08/13/2018    BILITOT 1.8 (H) 08/13/2018    ALKPHOS 133 (H) 08/13/2018    AST 39 (H) 08/13/2018    ALT 27 08/13/2018     Lipid Panel:     HbA1c:       Visual Acuity:  No exam data present    Age-appropriate Screening Schedule:  Refer to the list below for future screening recommendations based on patient's age, sex and/or medical conditions. Orders for these recommended tests are listed in the plan section. The patient has been provided with a written plan.    Health Maintenance   Topic Date Due   • TDAP/TD VACCINES (1 - Tdap) 11/24/1944   • ZOSTER VACCINE (1 of 2) 11/24/1975   • PNEUMOCOCCAL VACCINES (65+ LOW/MEDIUM RISK) (1 of 2 - PCV13) 11/24/1990   • INFLUENZA VACCINE  08/01/2018        Subjective   History of Present Illness    Dacia Nelson is a 89 y.o. female who presents for an Subsequent Wellness Visit.    The following portions of the patient's history were reviewed and updated as appropriate: allergies, current medications, past family history, past medical history, past social history, past surgical history and problem list.    Outpatient Medications Prior to Visit   Medication Sig Dispense Refill   • ondansetron ODT (ZOFRAN-ODT) 8 MG disintegrating tablet Take 1 tablet by mouth Every 8 (Eight) Hours As Needed for Nausea or Vomiting. 15 tablet 0   • raNITIdine (ZANTAC) 150 MG tablet Take 1 tablet by mouth 2 (Two) Times a Day. 28 tablet 0     No facility-administered medications prior to  "visit.        Patient Active Problem List   Diagnosis   • Essential hypertension   • Irritable bowel syndrome with both constipation and diarrhea   • Primary osteoarthritis involving multiple joints       Advance Care Planning:  has an advance directive - a copy has been provided and is in file    Identification of Risk Factors:  Risk factors include: weight  and depression.    Review of Systems    Compared to one year ago, the patient feels her physical health is better.  Compared to one year ago, the patient feels her mental health is better.    Objective     Physical Exam    Vitals:    08/21/18 1048   BP: 108/62   Pulse: 73   Resp: 16   Temp: 97.7 °F (36.5 °C)   TempSrc: Oral   SpO2: 98%   Weight: 68 kg (150 lb)   Height: 154.9 cm (60.98\")   PainSc: 0-No pain       Patient's Body mass index is 28.36 kg/m². BMI is above normal parameters. Recommendations include: educational material.      Assessment/Plan   Patient Self-Management and Personalized Health Advice  The patient has been provided with information about: diet and exercise and preventive services including:   · Advance directive, Exercise counseling provided.    Visit Diagnoses:    ICD-10-CM ICD-9-CM   1. Medicare annual wellness visit, subsequent Z00.00 V70.0       No orders of the defined types were placed in this encounter.      Outpatient Encounter Prescriptions as of 8/21/2018   Medication Sig Dispense Refill   • ondansetron ODT (ZOFRAN-ODT) 8 MG disintegrating tablet Take 1 tablet by mouth Every 8 (Eight) Hours As Needed for Nausea or Vomiting. 15 tablet 0   • [DISCONTINUED] raNITIdine (ZANTAC) 150 MG tablet Take 1 tablet by mouth 2 (Two) Times a Day. 28 tablet 0   • [DISCONTINUED] raNITIdine (ZANTAC) 150 MG tablet Take 1 tablet by mouth 2 (Two) Times a Day. 60 tablet 11     No facility-administered encounter medications on file as of 8/21/2018.        Reviewed use of high risk medication in the elderly: yes  Reviewed for potential of harmful drug " interactions in the elderly: yes    Follow Up:  No Follow-up on file.     An After Visit Summary and PPPS with all of these plans were given to the patient.

## 2018-08-27 ENCOUNTER — TELEPHONE (OUTPATIENT)
Dept: FAMILY MEDICINE CLINIC | Facility: CLINIC | Age: 83
End: 2018-08-27

## 2018-08-27 RX ORDER — ONDANSETRON 8 MG/1
8 TABLET, ORALLY DISINTEGRATING ORAL EVERY 8 HOURS PRN
Qty: 15 TABLET | Refills: 0 | Status: SHIPPED | OUTPATIENT
Start: 2018-08-27 | End: 2018-09-25 | Stop reason: HOSPADM

## 2018-08-27 NOTE — TELEPHONE ENCOUNTER
----- Message from Cristina Pardo sent at 8/27/2018  1:30 PM EDT -----  Contact: PT  REFILL REQUEST    ondansetron ODT (ZOFRAN-ODT) 8 MG disintegrating tablet    [facility] - [address] - [phone]  - [phone] (Phone)  [phone] (Fax)

## 2018-08-28 ENCOUNTER — TELEPHONE (OUTPATIENT)
Dept: FAMILY MEDICINE CLINIC | Facility: CLINIC | Age: 83
End: 2018-08-28

## 2018-08-28 RX ORDER — ONDANSETRON HYDROCHLORIDE 8 MG/1
8 TABLET, FILM COATED ORAL EVERY 8 HOURS PRN
Qty: 15 TABLET | Refills: 1 | Status: SHIPPED | OUTPATIENT
Start: 2018-08-28 | End: 2018-09-25 | Stop reason: HOSPADM

## 2018-08-28 NOTE — TELEPHONE ENCOUNTER
----- Message from Guerline Pruett sent at 8/28/2018  2:07 PM EDT -----  Contact: RX  PLEASE RESEND TO RX, SAID THEY DID NOT GET IT YESTERDAY AND NEED TO DELIVER TO PATIENT:    Telephone     8/27/2018  [facility]  Saeid, PREMA Schwarz   Family Medicine   Conversation   (Oldest Message First)   Yasmeen Zheng MA          8/27/18 2:07 PM   Note        ----- Message from Cristina Pardo sent at 8/27/2018  1:30 PM EDT -----  Contact: PT  REFILL REQUEST     ondansetron ODT (ZOFRAN-ODT) 8 MG disintegrating tablet     [facility] - [address] - [phone]  - [phone] FX           [phone] (Phone)  [phone] (Fax)             Yasmeen Zheng MA          8/27/18 2:08 PM   Note        Sent to pharm    Additional Documentation     Encounter Info:   Billing Info,   History,   Allergies,   Detailed Report     Orders Placed     None   Approved Medication Requests        Ondansetron 8 mg Oral Every 8 Hours PRN     Medication List  Visit Diagnoses       None     Problem List

## 2018-09-11 ENCOUNTER — TELEPHONE (OUTPATIENT)
Dept: FAMILY MEDICINE CLINIC | Facility: CLINIC | Age: 83
End: 2018-09-11

## 2018-09-11 DIAGNOSIS — F32.0 MILD SINGLE CURRENT EPISODE OF MAJOR DEPRESSIVE DISORDER (HCC): Primary | ICD-10-CM

## 2018-09-11 NOTE — TELEPHONE ENCOUNTER
Patient is requesting an antidepressant. Mandy (daughter) said that she doesn't want to go anywhere and just wants to sit at home alone. Her legs have also started swelling again and daughter is concerned about cellulitis. She has a prescription for Lasix and wants to know if she should take it daily or every other day.

## 2018-09-11 NOTE — TELEPHONE ENCOUNTER
Message left for daughter informing her that Zoloft has been sent to [facility] and that [facility] would like patient to take diuretic every other day.

## 2018-09-11 NOTE — TELEPHONE ENCOUNTER
----- Message from Cristina Pardo sent at 9/10/2018 11:21 AM EDT -----  Contact: PTS DAUGHTER, LILLY WYNN [phone]  TONYA WAS GOING TO ORDER MEDICATION A FEW WEEKS AGO BUT NEEDS IT AT THIS TIME. WHAT HE HAD HER DO IS NOT WORKING, MEDS ARE NEEDED NOW.     [facility]. PLEASE CALL DAUGHTER AND LET HER KNOW IF THIS IS BEING CALLED IN .

## 2018-09-13 ENCOUNTER — APPOINTMENT (OUTPATIENT)
Dept: GENERAL RADIOLOGY | Facility: HOSPITAL | Age: 83
End: 2018-09-13

## 2018-09-13 ENCOUNTER — APPOINTMENT (OUTPATIENT)
Dept: CARDIOLOGY | Facility: HOSPITAL | Age: 83
End: 2018-09-13
Attending: EMERGENCY MEDICINE

## 2018-09-13 ENCOUNTER — HOSPITAL ENCOUNTER (INPATIENT)
Facility: HOSPITAL | Age: 83
LOS: 12 days | Discharge: NURSING, REHAB OR LONG-TERM CARE | End: 2018-09-25
Attending: EMERGENCY MEDICINE | Admitting: INTERNAL MEDICINE

## 2018-09-13 DIAGNOSIS — E86.0 DEHYDRATION WITH HYPONATREMIA: ICD-10-CM

## 2018-09-13 DIAGNOSIS — K92.2 UPPER GI BLEED: ICD-10-CM

## 2018-09-13 DIAGNOSIS — E46 MALNUTRITION COMPROMISING BODILY FUNCTION (HCC): ICD-10-CM

## 2018-09-13 DIAGNOSIS — R62.7 ADULT FAILURE TO THRIVE SYNDROME: ICD-10-CM

## 2018-09-13 DIAGNOSIS — R60.9 DEPENDENT EDEMA: ICD-10-CM

## 2018-09-13 DIAGNOSIS — Z74.09 IMPAIRED MOBILITY AND ADLS: ICD-10-CM

## 2018-09-13 DIAGNOSIS — Z78.9 IMPAIRED MOBILITY AND ADLS: ICD-10-CM

## 2018-09-13 DIAGNOSIS — Z74.09 IMPAIRED FUNCTIONAL MOBILITY, BALANCE, GAIT, AND ENDURANCE: ICD-10-CM

## 2018-09-13 DIAGNOSIS — E87.1 DEHYDRATION WITH HYPONATREMIA: ICD-10-CM

## 2018-09-13 DIAGNOSIS — N17.9 ACUTE RENAL FAILURE, UNSPECIFIED ACUTE RENAL FAILURE TYPE (HCC): Primary | ICD-10-CM

## 2018-09-13 PROBLEM — Z63.4 BEREAVEMENT: Status: ACTIVE | Noted: 2018-09-13

## 2018-09-13 PROBLEM — N39.0 UTI (URINARY TRACT INFECTION): Status: ACTIVE | Noted: 2018-09-13

## 2018-09-13 LAB
ALBUMIN SERPL-MCNC: 3.04 G/DL (ref 3.2–4.8)
ALBUMIN/GLOB SERPL: 0.9 G/DL (ref 1.5–2.5)
ALP SERPL-CCNC: 152 U/L (ref 25–100)
ALT SERPL W P-5'-P-CCNC: 37 U/L (ref 7–40)
ANION GAP SERPL CALCULATED.3IONS-SCNC: 17 MMOL/L (ref 3–11)
AST SERPL-CCNC: 31 U/L (ref 0–33)
BACTERIA UR QL AUTO: ABNORMAL /HPF
BASOPHILS # BLD AUTO: 0.08 10*3/MM3 (ref 0–0.2)
BASOPHILS NFR BLD AUTO: 1.2 % (ref 0–1)
BILIRUB SERPL-MCNC: 0.8 MG/DL (ref 0.3–1.2)
BILIRUB UR QL STRIP: NEGATIVE
BNP SERPL-MCNC: 135 PG/ML (ref 0–100)
BUN BLD-MCNC: 38 MG/DL (ref 9–23)
BUN/CREAT SERPL: 17.9 (ref 7–25)
CALCIUM SPEC-SCNC: 8.4 MG/DL (ref 8.7–10.4)
CHLORIDE SERPL-SCNC: 91 MMOL/L (ref 99–109)
CLARITY UR: ABNORMAL
CO2 SERPL-SCNC: 19 MMOL/L (ref 20–31)
COLOR UR: YELLOW
CREAT BLD-MCNC: 2.12 MG/DL (ref 0.6–1.3)
CREAT UR-MCNC: 117.6 MG/DL
DEPRECATED RDW RBC AUTO: 55.3 FL (ref 37–54)
EOSINOPHIL # BLD AUTO: 0.14 10*3/MM3 (ref 0–0.3)
EOSINOPHIL NFR BLD AUTO: 2.1 % (ref 0–3)
ERYTHROCYTE [DISTWIDTH] IN BLOOD BY AUTOMATED COUNT: 15.2 % (ref 11.3–14.5)
ERYTHROCYTE [SEDIMENTATION RATE] IN BLOOD: 50 MM/HR (ref 0–30)
GFR SERPL CREATININE-BSD FRML MDRD: 22 ML/MIN/1.73
GLOBULIN UR ELPH-MCNC: 3.6 GM/DL
GLUCOSE BLD-MCNC: 98 MG/DL (ref 70–100)
GLUCOSE UR STRIP-MCNC: NEGATIVE MG/DL
HCT VFR BLD AUTO: 35 % (ref 34.5–44)
HGB BLD-MCNC: 11.8 G/DL (ref 11.5–15.5)
HGB UR QL STRIP.AUTO: NEGATIVE
HOLD SPECIMEN: NORMAL
HOLD SPECIMEN: NORMAL
HYALINE CASTS UR QL AUTO: ABNORMAL /LPF
IMM GRANULOCYTES # BLD: 0.02 10*3/MM3 (ref 0–0.03)
IMM GRANULOCYTES NFR BLD: 0.3 % (ref 0–0.6)
KETONES UR QL STRIP: ABNORMAL
LEUKOCYTE ESTERASE UR QL STRIP.AUTO: ABNORMAL
LYMPHOCYTES # BLD AUTO: 0.6 10*3/MM3 (ref 0.6–4.8)
LYMPHOCYTES NFR BLD AUTO: 9.1 % (ref 24–44)
MAGNESIUM SERPL-MCNC: 2 MG/DL (ref 1.3–2.7)
MCH RBC QN AUTO: 33.4 PG (ref 27–31)
MCHC RBC AUTO-ENTMCNC: 33.7 G/DL (ref 32–36)
MCV RBC AUTO: 99.2 FL (ref 80–99)
MONOCYTES # BLD AUTO: 0.77 10*3/MM3 (ref 0–1)
MONOCYTES NFR BLD AUTO: 11.7 % (ref 0–12)
NEUTROPHILS # BLD AUTO: 4.97 10*3/MM3 (ref 1.5–8.3)
NEUTROPHILS NFR BLD AUTO: 75.9 % (ref 41–71)
NITRITE UR QL STRIP: POSITIVE
OSMOLALITY UR: 291 MOSM/KG (ref 300–1100)
PH UR STRIP.AUTO: <=5 [PH] (ref 5–8)
PLATELET # BLD AUTO: 158 10*3/MM3 (ref 150–450)
PMV BLD AUTO: 10.1 FL (ref 6–12)
POTASSIUM BLD-SCNC: 4.9 MMOL/L (ref 3.5–5.5)
PROT SERPL-MCNC: 6.6 G/DL (ref 5.7–8.2)
PROT UR QL STRIP: NEGATIVE
PROT UR-MCNC: 21 MG/DL (ref 1–14)
RBC # BLD AUTO: 3.53 10*6/MM3 (ref 3.89–5.14)
RBC # UR: ABNORMAL /HPF
REF LAB TEST METHOD: ABNORMAL
SODIUM BLD-SCNC: 127 MMOL/L (ref 132–146)
SODIUM UR-SCNC: 31 MMOL/L (ref 30–90)
SP GR UR STRIP: 1.01 (ref 1–1.03)
SQUAMOUS #/AREA URNS HPF: ABNORMAL /HPF
TROPONIN I SERPL-MCNC: 0.01 NG/ML (ref 0–0.07)
TSH SERPL DL<=0.05 MIU/L-ACNC: 4.9 MIU/ML (ref 0.35–5.35)
UROBILINOGEN UR QL STRIP: ABNORMAL
WBC NRBC COR # BLD: 6.56 10*3/MM3 (ref 3.5–10.8)
WBC UR QL AUTO: ABNORMAL /HPF
WHOLE BLOOD HOLD SPECIMEN: NORMAL
WHOLE BLOOD HOLD SPECIMEN: NORMAL

## 2018-09-13 PROCEDURE — 99223 1ST HOSP IP/OBS HIGH 75: CPT | Performed by: HOSPITALIST

## 2018-09-13 PROCEDURE — 84484 ASSAY OF TROPONIN QUANT: CPT

## 2018-09-13 PROCEDURE — 93970 EXTREMITY STUDY: CPT | Performed by: INTERNAL MEDICINE

## 2018-09-13 PROCEDURE — 83935 ASSAY OF URINE OSMOLALITY: CPT | Performed by: NURSE PRACTITIONER

## 2018-09-13 PROCEDURE — 87186 SC STD MICRODIL/AGAR DIL: CPT | Performed by: NURSE PRACTITIONER

## 2018-09-13 PROCEDURE — 83735 ASSAY OF MAGNESIUM: CPT | Performed by: EMERGENCY MEDICINE

## 2018-09-13 PROCEDURE — 84300 ASSAY OF URINE SODIUM: CPT | Performed by: NURSE PRACTITIONER

## 2018-09-13 PROCEDURE — 87077 CULTURE AEROBIC IDENTIFY: CPT | Performed by: NURSE PRACTITIONER

## 2018-09-13 PROCEDURE — 93005 ELECTROCARDIOGRAM TRACING: CPT | Performed by: EMERGENCY MEDICINE

## 2018-09-13 PROCEDURE — 83880 ASSAY OF NATRIURETIC PEPTIDE: CPT | Performed by: EMERGENCY MEDICINE

## 2018-09-13 PROCEDURE — 25010000002 DEXAMETHASONE PER 1 MG: Performed by: NURSE PRACTITIONER

## 2018-09-13 PROCEDURE — 99285 EMERGENCY DEPT VISIT HI MDM: CPT

## 2018-09-13 PROCEDURE — 85025 COMPLETE CBC W/AUTO DIFF WBC: CPT | Performed by: EMERGENCY MEDICINE

## 2018-09-13 PROCEDURE — 71045 X-RAY EXAM CHEST 1 VIEW: CPT

## 2018-09-13 PROCEDURE — 85652 RBC SED RATE AUTOMATED: CPT | Performed by: EMERGENCY MEDICINE

## 2018-09-13 PROCEDURE — 81001 URINALYSIS AUTO W/SCOPE: CPT | Performed by: EMERGENCY MEDICINE

## 2018-09-13 PROCEDURE — 25010000002 HEPARIN (PORCINE) PER 1000 UNITS: Performed by: NURSE PRACTITIONER

## 2018-09-13 PROCEDURE — 25010000002 ONDANSETRON PER 1 MG: Performed by: EMERGENCY MEDICINE

## 2018-09-13 PROCEDURE — 82570 ASSAY OF URINE CREATININE: CPT | Performed by: NURSE PRACTITIONER

## 2018-09-13 PROCEDURE — 80053 COMPREHEN METABOLIC PANEL: CPT | Performed by: EMERGENCY MEDICINE

## 2018-09-13 PROCEDURE — 84443 ASSAY THYROID STIM HORMONE: CPT | Performed by: EMERGENCY MEDICINE

## 2018-09-13 PROCEDURE — 84156 ASSAY OF PROTEIN URINE: CPT | Performed by: NURSE PRACTITIONER

## 2018-09-13 PROCEDURE — 25010000002 DEXAMETHASONE PER 1 MG: Performed by: EMERGENCY MEDICINE

## 2018-09-13 PROCEDURE — 87086 URINE CULTURE/COLONY COUNT: CPT | Performed by: NURSE PRACTITIONER

## 2018-09-13 PROCEDURE — 93970 EXTREMITY STUDY: CPT

## 2018-09-13 RX ORDER — SODIUM CHLORIDE 0.9 % (FLUSH) 0.9 %
1-10 SYRINGE (ML) INJECTION AS NEEDED
Status: DISCONTINUED | OUTPATIENT
Start: 2018-09-13 | End: 2018-09-25 | Stop reason: HOSPADM

## 2018-09-13 RX ORDER — FUROSEMIDE 20 MG/1
20 TABLET ORAL EVERY OTHER DAY
COMMUNITY

## 2018-09-13 RX ORDER — DEXAMETHASONE SODIUM PHOSPHATE 4 MG/ML
4 INJECTION, SOLUTION INTRA-ARTICULAR; INTRALESIONAL; INTRAMUSCULAR; INTRAVENOUS; SOFT TISSUE EVERY 6 HOURS SCHEDULED
Status: DISPENSED | OUTPATIENT
Start: 2018-09-13 | End: 2018-09-18

## 2018-09-13 RX ORDER — SODIUM CHLORIDE 0.9 % (FLUSH) 0.9 %
10 SYRINGE (ML) INJECTION AS NEEDED
Status: DISCONTINUED | OUTPATIENT
Start: 2018-09-13 | End: 2018-09-25 | Stop reason: HOSPADM

## 2018-09-13 RX ORDER — FUROSEMIDE 20 MG/1
20 TABLET ORAL EVERY OTHER DAY
Status: DISCONTINUED | OUTPATIENT
Start: 2018-09-15 | End: 2018-09-15

## 2018-09-13 RX ORDER — SODIUM CHLORIDE 9 MG/ML
125 INJECTION, SOLUTION INTRAVENOUS CONTINUOUS
Status: DISCONTINUED | OUTPATIENT
Start: 2018-09-13 | End: 2018-09-13

## 2018-09-13 RX ORDER — POTASSIUM CHLORIDE 750 MG/1
10 CAPSULE, EXTENDED RELEASE ORAL DAILY
Status: DISCONTINUED | OUTPATIENT
Start: 2018-09-14 | End: 2018-09-25 | Stop reason: HOSPADM

## 2018-09-13 RX ORDER — FAMOTIDINE 20 MG/1
20 TABLET, FILM COATED ORAL 2 TIMES DAILY
Status: DISCONTINUED | OUTPATIENT
Start: 2018-09-13 | End: 2018-09-13

## 2018-09-13 RX ORDER — ACETAMINOPHEN 325 MG/1
650 TABLET ORAL EVERY 4 HOURS PRN
Status: DISCONTINUED | OUTPATIENT
Start: 2018-09-13 | End: 2018-09-25 | Stop reason: HOSPADM

## 2018-09-13 RX ORDER — HEPARIN SODIUM 5000 [USP'U]/ML
5000 INJECTION, SOLUTION INTRAVENOUS; SUBCUTANEOUS EVERY 8 HOURS SCHEDULED
Status: DISCONTINUED | OUTPATIENT
Start: 2018-09-13 | End: 2018-09-14

## 2018-09-13 RX ORDER — FAMOTIDINE 20 MG/1
20 TABLET, FILM COATED ORAL DAILY
Status: DISCONTINUED | OUTPATIENT
Start: 2018-09-14 | End: 2018-09-19

## 2018-09-13 RX ORDER — DIPHENOXYLATE HYDROCHLORIDE AND ATROPINE SULFATE 2.5; .025 MG/1; MG/1
2 TABLET ORAL 2 TIMES DAILY PRN
COMMUNITY
End: 2018-09-25 | Stop reason: HOSPADM

## 2018-09-13 RX ORDER — ONDANSETRON 2 MG/ML
4 INJECTION INTRAMUSCULAR; INTRAVENOUS EVERY 6 HOURS PRN
Status: DISCONTINUED | OUTPATIENT
Start: 2018-09-13 | End: 2018-09-25 | Stop reason: HOSPADM

## 2018-09-13 RX ORDER — POTASSIUM CHLORIDE 750 MG/1
10 TABLET, FILM COATED, EXTENDED RELEASE ORAL DAILY
COMMUNITY

## 2018-09-13 RX ORDER — ONDANSETRON 2 MG/ML
4 INJECTION INTRAMUSCULAR; INTRAVENOUS ONCE
Status: COMPLETED | OUTPATIENT
Start: 2018-09-13 | End: 2018-09-13

## 2018-09-13 RX ORDER — ONDANSETRON 4 MG/1
4 TABLET, FILM COATED ORAL EVERY 6 HOURS PRN
Status: DISCONTINUED | OUTPATIENT
Start: 2018-09-13 | End: 2018-09-25 | Stop reason: HOSPADM

## 2018-09-13 RX ORDER — DEXAMETHASONE SODIUM PHOSPHATE 4 MG/ML
8 INJECTION, SOLUTION INTRA-ARTICULAR; INTRALESIONAL; INTRAMUSCULAR; INTRAVENOUS; SOFT TISSUE ONCE
Status: COMPLETED | OUTPATIENT
Start: 2018-09-13 | End: 2018-09-13

## 2018-09-13 RX ORDER — FAMOTIDINE 10 MG/ML
20 INJECTION, SOLUTION INTRAVENOUS ONCE
Status: COMPLETED | OUTPATIENT
Start: 2018-09-13 | End: 2018-09-13

## 2018-09-13 RX ORDER — SODIUM CHLORIDE 9 MG/ML
100 INJECTION, SOLUTION INTRAVENOUS CONTINUOUS
Status: DISCONTINUED | OUTPATIENT
Start: 2018-09-13 | End: 2018-09-14

## 2018-09-13 RX ADMIN — SODIUM CHLORIDE 125 ML/HR: 9 INJECTION, SOLUTION INTRAVENOUS at 19:32

## 2018-09-13 RX ADMIN — ONDANSETRON 4 MG: 2 INJECTION INTRAMUSCULAR; INTRAVENOUS at 19:36

## 2018-09-13 RX ADMIN — DEXAMETHASONE SODIUM PHOSPHATE 4 MG: 4 INJECTION, SOLUTION INTRAMUSCULAR; INTRAVENOUS at 23:52

## 2018-09-13 RX ADMIN — SODIUM CHLORIDE 100 ML/HR: 9 INJECTION, SOLUTION INTRAVENOUS at 23:53

## 2018-09-13 RX ADMIN — FAMOTIDINE 20 MG: 10 INJECTION, SOLUTION INTRAVENOUS at 19:36

## 2018-09-13 RX ADMIN — HEPARIN SODIUM 5000 UNITS: 5000 INJECTION, SOLUTION INTRAVENOUS; SUBCUTANEOUS at 23:52

## 2018-09-13 RX ADMIN — DEXAMETHASONE SODIUM PHOSPHATE 8 MG: 4 INJECTION, SOLUTION INTRAMUSCULAR; INTRAVENOUS at 19:36

## 2018-09-13 NOTE — H&P
[facility] Medicine Services  HISTORY AND PHYSICAL    Patient Name: Dacia Nelson  : 1925  MRN: [id number]  Primary Care Physician: Momo Breaux PA  Date of admission: 2018      Subjective   Subjective     Chief Complaint:  Generalized weakness    HPI:  Dacia Nelson is a 89 y.o. female presents to the ED with complaints of generalized weakness.  Patient reports that her health has been declining since April when her  passed away.  She states that she sleeps approximately 18 hours a day, has had a loss of appetite, muscle aches, nausea, generalized weakness with difficulty walking, lower extremity edema which is chronic but somewhat worse over the last two days and a bout of diarrhea this morning.  She denies fever, shortness of air, chest pain, vomiting, abdominal pain, dysuria, or any other complaints at this time.  Her daughter states that they have been looking into assisted living facilities in the area because she is unsafe to live alone.  An ultrasound of her lower extremities was done and negative for a clot.  Chest xray is negative.  Labs revealed an acute renal failure and dehydration.  She was given steroids in the ED for possible polymyalgia rheumatica.  Patient is being admitted to the Hospitalist for further evaluation and management.        Review of Systems   Constitutional: Positive for activity change, appetite change and fatigue. Negative for chills, diaphoresis, fever and unexpected weight change.   HENT: Negative.    Eyes: Negative.    Respiratory: Negative.    Cardiovascular: Positive for leg swelling. Negative for chest pain and palpitations.   Gastrointestinal: Positive for diarrhea and nausea. Negative for abdominal distention, abdominal pain, anal bleeding, blood in stool, constipation, rectal pain and vomiting.   Endocrine: Negative.    Genitourinary: Positive for decreased urine volume. Negative for difficulty urinating,  dysuria, flank pain, frequency, hematuria and urgency.   Musculoskeletal: Positive for gait problem and myalgias. Negative for back pain, joint swelling, neck pain and neck stiffness.   Skin: Negative.    Allergic/Immunologic: Negative.    Neurological: Positive for weakness (generalized). Negative for dizziness, tremors, seizures, syncope, facial asymmetry, speech difficulty, light-headedness, numbness and headaches.   Hematological: Negative.    Psychiatric/Behavioral: Negative.         Otherwise 10-system ROS reviewed and is negative except as mentioned in the HPI.    Personal History     Past Medical History:   Diagnosis Date   • Arthritis    • Cellulitis    • Gastritis        Past Surgical History:   Procedure Laterality Date   • HYSTERECTOMY         Family History: family history is not on file.     Social History:  reports that she has never smoked. She has never used smokeless tobacco. She reports that she does not drink alcohol or use drugs.  Social History     Social History Narrative   • No narrative on file       Medications:  Available home medication information reviewed     Allergies   Allergen Reactions   • Eggs Or Egg-Derived Products Hives   • Penicillins Unknown (See Comments)     Can't remember   • Sulfa Antibiotics Hives       Objective   Objective     Vital Signs:   Temp:  [97.4 °F (36.3 °C)] 97.4 °F (36.3 °C)  Heart Rate:  [71-79] 71  Resp:  [14] 14  BP: (110-143)/(46-69) 110/46        Physical Exam   Constitutional: She is oriented to person, place, and time. She appears well-developed. No distress.   HENT:   Head: Normocephalic.   Eyes: Pupils are equal, round, and reactive to light.   Neck: Normal range of motion. Neck supple.   Cardiovascular: Normal rate, regular rhythm, normal heart sounds and intact distal pulses.  Exam reveals no gallop and no friction rub.    No murmur heard.  Pulmonary/Chest: Effort normal and breath sounds normal. No respiratory distress. She has no wheezes. She has  no rales. She exhibits no tenderness.   Abdominal: Soft. Bowel sounds are normal. She exhibits no distension and no mass. There is tenderness (mild RUQ tenderness). There is no rebound and no guarding. No hernia.   Musculoskeletal: Normal range of motion. She exhibits edema (1+ BLE). She exhibits no tenderness or deformity.   Neurological: She is alert and oriented to person, place, and time.   Skin: Skin is warm and dry. No rash noted. She is not diaphoretic. There is erythema (venous stasis changes BLE). No pallor.   Psychiatric: She has a normal mood and affect. Her behavior is normal. Judgment and thought content normal.        Results Reviewed:  I have personally reviewed current lab, radiology, and data and agree.      Results from last 7 days  Lab Units 09/13/18  1637   WBC 10*3/mm3 6.56   HEMOGLOBIN g/dL 11.8   HEMATOCRIT % 35.0   PLATELETS 10*3/mm3 158       Results from last 7 days  Lab Units 09/13/18  1637   SODIUM mmol/L 127*   POTASSIUM mmol/L 4.9   CHLORIDE mmol/L 91*   CO2 mmol/L 19.0*   BUN mg/dL 38*   CREATININE mg/dL 2.12*   GLUCOSE mg/dL 98   CALCIUM mg/dL 8.4*   ALT (SGPT) U/L 37   AST (SGOT) U/L 31     Estimated Creatinine Clearance: 14.9 mL/min (A) (by C-G formula based on SCr of 2.12 mg/dL (H)).  Brief Urine Lab Results  (Last result in the past 365 days)      Color   Clarity   Blood   Leuk Est   Nitrite   Protein   CREAT   Urine HCG        09/13/18 1813 Yellow Cloudy(A) Negative Small (1+)(A) Positive(A) Negative             BNP   Date Value Ref Range Status   09/13/2018 135.0 (H) 0.0 - 100.0 pg/mL Final     Comment:     Results may be falsely decreased if patient taking Biotin.     Imaging Results (last 24 hours)     Procedure Component Value Units Date/Time    XR Chest 1 View [[id number]] Collected:  09/13/18 1644     Updated:  09/13/18 1649    Narrative:       EXAMINATION: XR CHEST 1 VW- 09/13/2018     INDICATION: Weak/Dizzy/AMS triage protocol      COMPARISON: 08/13/2018     FINDINGS:  Cardiac silhouette is normal. There are chronic pulmonary  changes. There is no acute inflammatory process, mass or effusion.           Impression:       Chronic change; no acute findings.     D:  09/13/2018  E:  09/13/2018     This report was finalized on 9/13/2018 4:47 PM by Dr. Wild Hill MD.                Assessment/Plan   Assessment / Plan     Hospital Problem List     * (Principal)Acute renal failure (CMS/HCC)    FTT (failure to thrive) in adult    Dehydration    Hyponatremia    Essential hypertension    Irritable bowel syndrome with both constipation and diarrhea    UTI (urinary tract infection)            Assessment & Plan:    1.  Acute renal failure   -NS @ 100 ml/hr   -urine studies   -bmp in the am    2.  Adult FTT with malnutrition/FUNCTIONAL DECLINE DUE TO PHYSICAL DECONDITIONING   -consult case management for discharge planning   -consult pt/ot in the am   -fall precautions   -consult nutrition in the am    3.  Dehydration   -IV fluids   -am labs    4.  Hyponatremia    -urine sodium and urine osmolality   -NS @ 100 ml/hr   -bmp in the am    5.  UTI, asymptomatic ? COLONIZATION   -hold off an antibiotics for now since patient is asymptomatic and observe   -urine culture    6.  Essential hypertension, stable    7.  Diarrhea with a h/o IBS, resolved    8.  Polymyalgia rheumatic   -received 8 mg IV decadron in the ED   -will give 4 mg IV decadron q 6 hours for now  9. BEREAVEMENT, CONSULT PASTORAL CARE PRN    DVT prophylaxis:  heparin    CODE STATUS:    Code Status and Medical Interventions:   Ordered at: 09/13/18 2001     Limited Support to NOT Include:    Intubation    Dialysis    Cardioversion/Defibrillation    Artificial Nutrition     Level Of Support Discussed With:    Patient     Code Status:    No CPR     Medical Interventions (Level of Support Prior to Arrest):    Limited           Electronically signed by LEXI Dong, 09/13/18, 7:31 PM.      Brief Attending Admission Attestation      I have seen and examined the patient, performing an independent face-to-face diagnostic evaluation with plan of care reviewed and developed with the advanced practice clinician (APC).      Brief Summary Statement/HPI:   Dacia Nelson is a 89 y.o. female, who had been grieving over the passing of her  since 4/2018. She reported that she had not been eating or drinking much. She was brought in due to progressive generalized weakness and LE edema, as well as muscle aches. She had been sleeping a lot and now having some difficulties with walking; overall progressive physical deconditioning. W/U in the ED revealed signs of dehydration and ESHA. UA was also abnormal, but pt denies any symptoms. No F/C.        Attending Physical Exam:  General Assessment: No acute cardiopulmonary distress. Well developed and well nourished.    HEENT: NCAT, PERRL, MM dry    Neck: Supple    CVS: RRR, S1S2 normal, no murmurs    Resp: CTAB, no adventitious sound    Abd: soft, NT, ND, normal BS, no guarding or peritoneal signs    Ext: + bilat LE pitting edema, R>L with some erythema/no warmth    Neuro: Nonfocal    Skin: W/D/I. No rash.    Psych: Affect is appropriate      Brief Assessment/Plan :  See above for further detailed assessment and plan developed with APC which I have reviewed and/or edited.    INPATIENT status due to the need for care which can only be reasonably provided in an hospital setting such as aggressive/expedited ancillary services and/or consultation services, the necessity for IV medications, close physician monitoring and/or the possible need for procedures.  In such, I feel patient’s risk for adverse outcomes and need for care warrant INPATIENT evaluation and predict the patient’s care encounter to likely last beyond 2 midnights.    Electronically signed by Bryanna Vanegas MD, 09/13/18, 11:20 PM.

## 2018-09-14 ENCOUNTER — EPISODE CHANGES (OUTPATIENT)
Dept: CASE MANAGEMENT | Facility: OTHER | Age: 83
End: 2018-09-14

## 2018-09-14 PROBLEM — D64.9 ANEMIA: Status: ACTIVE | Noted: 2018-09-14

## 2018-09-14 LAB
ANION GAP SERPL CALCULATED.3IONS-SCNC: 12 MMOL/L (ref 3–11)
BASOPHILS # BLD AUTO: 0.01 10*3/MM3 (ref 0–0.2)
BASOPHILS NFR BLD AUTO: 0.3 % (ref 0–1)
BUN BLD-MCNC: 36 MG/DL (ref 9–23)
BUN/CREAT SERPL: 18.5 (ref 7–25)
CALCIUM SPEC-SCNC: 7.6 MG/DL (ref 8.7–10.4)
CHLORIDE SERPL-SCNC: 97 MMOL/L (ref 99–109)
CO2 SERPL-SCNC: 20 MMOL/L (ref 20–31)
CREAT BLD-MCNC: 1.95 MG/DL (ref 0.6–1.3)
DEPRECATED RDW RBC AUTO: 55.9 FL (ref 37–54)
EOSINOPHIL # BLD AUTO: 0 10*3/MM3 (ref 0–0.3)
EOSINOPHIL NFR BLD AUTO: 0 % (ref 0–3)
ERYTHROCYTE [DISTWIDTH] IN BLOOD BY AUTOMATED COUNT: 15.2 % (ref 11.3–14.5)
GFR SERPL CREATININE-BSD FRML MDRD: 24 ML/MIN/1.73
GLUCOSE BLD-MCNC: 131 MG/DL (ref 70–100)
HCT VFR BLD AUTO: 30.6 % (ref 34.5–44)
HGB BLD-MCNC: 10.4 G/DL (ref 11.5–15.5)
IMM GRANULOCYTES # BLD: 0.01 10*3/MM3 (ref 0–0.03)
IMM GRANULOCYTES NFR BLD: 0.3 % (ref 0–0.6)
LYMPHOCYTES # BLD AUTO: 0.28 10*3/MM3 (ref 0.6–4.8)
LYMPHOCYTES NFR BLD AUTO: 8.5 % (ref 24–44)
MCH RBC QN AUTO: 33.9 PG (ref 27–31)
MCHC RBC AUTO-ENTMCNC: 34 G/DL (ref 32–36)
MCV RBC AUTO: 99.7 FL (ref 80–99)
MONOCYTES # BLD AUTO: 0.03 10*3/MM3 (ref 0–1)
MONOCYTES NFR BLD AUTO: 0.9 % (ref 0–12)
NEUTROPHILS # BLD AUTO: 2.97 10*3/MM3 (ref 1.5–8.3)
NEUTROPHILS NFR BLD AUTO: 90.3 % (ref 41–71)
PLATELET # BLD AUTO: 149 10*3/MM3 (ref 150–450)
PMV BLD AUTO: 10.7 FL (ref 6–12)
POTASSIUM BLD-SCNC: 4.9 MMOL/L (ref 3.5–5.5)
RBC # BLD AUTO: 3.07 10*6/MM3 (ref 3.89–5.14)
SODIUM BLD-SCNC: 129 MMOL/L (ref 132–146)
WBC NRBC COR # BLD: 3.29 10*3/MM3 (ref 3.5–10.8)

## 2018-09-14 PROCEDURE — 25010000002 ERTAPENEM PER 500 MG: Performed by: INTERNAL MEDICINE

## 2018-09-14 PROCEDURE — 97161 PT EVAL LOW COMPLEX 20 MIN: CPT

## 2018-09-14 PROCEDURE — 80048 BASIC METABOLIC PNL TOTAL CA: CPT | Performed by: NURSE PRACTITIONER

## 2018-09-14 PROCEDURE — 99233 SBSQ HOSP IP/OBS HIGH 50: CPT | Performed by: INTERNAL MEDICINE

## 2018-09-14 PROCEDURE — 97166 OT EVAL MOD COMPLEX 45 MIN: CPT

## 2018-09-14 PROCEDURE — 25010000002 HEPARIN (PORCINE) PER 1000 UNITS: Performed by: INTERNAL MEDICINE

## 2018-09-14 PROCEDURE — 86335 IMMUNFIX E-PHORSIS/URINE/CSF: CPT | Performed by: NURSE PRACTITIONER

## 2018-09-14 PROCEDURE — 25010000002 DEXAMETHASONE PER 1 MG: Performed by: NURSE PRACTITIONER

## 2018-09-14 PROCEDURE — 97535 SELF CARE MNGMENT TRAINING: CPT

## 2018-09-14 PROCEDURE — 25010000002 HEPARIN (PORCINE) PER 1000 UNITS: Performed by: NURSE PRACTITIONER

## 2018-09-14 PROCEDURE — 85025 COMPLETE CBC W/AUTO DIFF WBC: CPT | Performed by: NURSE PRACTITIONER

## 2018-09-14 RX ORDER — MULTIPLE VITAMINS W/ MINERALS TAB 9MG-400MCG
1 TAB ORAL DAILY
Status: DISCONTINUED | OUTPATIENT
Start: 2018-09-14 | End: 2018-09-25 | Stop reason: HOSPADM

## 2018-09-14 RX ORDER — PANTOPRAZOLE SODIUM 40 MG/1
40 TABLET, DELAYED RELEASE ORAL
Status: DISCONTINUED | OUTPATIENT
Start: 2018-09-14 | End: 2018-09-19

## 2018-09-14 RX ORDER — SODIUM CHLORIDE 9 MG/ML
75 INJECTION, SOLUTION INTRAVENOUS CONTINUOUS
Status: DISCONTINUED | OUTPATIENT
Start: 2018-09-14 | End: 2018-09-16

## 2018-09-14 RX ORDER — HEPARIN SODIUM 5000 [USP'U]/ML
5000 INJECTION, SOLUTION INTRAVENOUS; SUBCUTANEOUS EVERY 12 HOURS SCHEDULED
Status: DISCONTINUED | OUTPATIENT
Start: 2018-09-14 | End: 2018-09-20

## 2018-09-14 RX ADMIN — DEXAMETHASONE SODIUM PHOSPHATE 4 MG: 4 INJECTION, SOLUTION INTRAMUSCULAR; INTRAVENOUS at 12:46

## 2018-09-14 RX ADMIN — ERTAPENEM SODIUM 500 MG: 1 INJECTION, POWDER, LYOPHILIZED, FOR SOLUTION INTRAMUSCULAR; INTRAVENOUS at 07:57

## 2018-09-14 RX ADMIN — HEPARIN SODIUM 5000 UNITS: 5000 INJECTION, SOLUTION INTRAVENOUS; SUBCUTANEOUS at 05:21

## 2018-09-14 RX ADMIN — SERTRALINE HYDROCHLORIDE 50 MG: 50 TABLET ORAL at 09:15

## 2018-09-14 RX ADMIN — MULTIPLE VITAMINS W/ MINERALS TAB 1 TABLET: TAB ORAL at 10:42

## 2018-09-14 RX ADMIN — SODIUM CHLORIDE 100 ML/HR: 9 INJECTION, SOLUTION INTRAVENOUS at 18:09

## 2018-09-14 RX ADMIN — SODIUM CHLORIDE 500 ML: 9 INJECTION, SOLUTION INTRAVENOUS at 04:00

## 2018-09-14 RX ADMIN — POTASSIUM CHLORIDE 10 MEQ: 750 CAPSULE, EXTENDED RELEASE ORAL at 09:15

## 2018-09-14 RX ADMIN — DEXAMETHASONE SODIUM PHOSPHATE 4 MG: 4 INJECTION, SOLUTION INTRAMUSCULAR; INTRAVENOUS at 05:21

## 2018-09-14 RX ADMIN — HEPARIN SODIUM 5000 UNITS: 5000 INJECTION, SOLUTION INTRAVENOUS; SUBCUTANEOUS at 21:02

## 2018-09-14 RX ADMIN — PANTOPRAZOLE SODIUM 40 MG: 40 TABLET, DELAYED RELEASE ORAL at 10:42

## 2018-09-14 RX ADMIN — DEXAMETHASONE SODIUM PHOSPHATE 4 MG: 4 INJECTION, SOLUTION INTRAMUSCULAR; INTRAVENOUS at 18:00

## 2018-09-14 RX ADMIN — FAMOTIDINE 20 MG: 20 TABLET, FILM COATED ORAL at 09:15

## 2018-09-14 NOTE — PLAN OF CARE
Problem: Patient Care Overview  Goal: Plan of Care Review  Outcome: Ongoing (interventions implemented as appropriate)   09/13/18 2131 09/14/18 0407   Plan of Care Review   Progress --  no change   OTHER   Outcome Summary --  VSS. Low urine output. Doing NS bolus now. Not retaining much. Bottom red (from diarrhea day before). Up with assist of 2. Continue to monitor.   Coping/Psychosocial   Plan of Care Reviewed With patient;spouse --      Goal: Individualization and Mutuality  Outcome: Ongoing (interventions implemented as appropriate)    Goal: Discharge Needs Assessment  Outcome: Ongoing (interventions implemented as appropriate)    Goal: Interprofessional Rounds/Family Conf  Outcome: Ongoing (interventions implemented as appropriate)      Problem: Fall Risk (Adult)  Goal: Identify Related Risk Factors and Signs and Symptoms  Outcome: Ongoing (interventions implemented as appropriate)    Goal: Absence of Fall  Outcome: Ongoing (interventions implemented as appropriate)      Problem: Skin Injury Risk (Adult)  Goal: Identify Related Risk Factors and Signs and Symptoms  Outcome: Ongoing (interventions implemented as appropriate)    Goal: Skin Health and Integrity  Outcome: Ongoing (interventions implemented as appropriate)

## 2018-09-14 NOTE — PROGRESS NOTES
[facility] Medicine Services  PROGRESS NOTE    Patient Name: Dacia Nelson  : 1925  MRN: [id number]    Date of Admission: 2018  Length of Stay: 1  Primary Care Physician: Momo Breaux PA    Subjective   Subjective     CC:  weakness    HPI:  Not hungry, dark urine but no overt dysuria, no abd pain. Feels a little better than when got here    Review of Systems  No headache    Otherwise ROS is negative except as mentioned in the HPI.    Objective   Objective     Vital Signs:   Temp:  [97.4 °F (36.3 °C)-97.9 °F (36.6 °C)] 97.6 °F (36.4 °C)  Heart Rate:  [71-81] 71  Resp:  [14-18] 16  BP: (110-147)/(46-69) 137/65        Physical Exam:  Constitutional:Alert, oriented x 3, nontoxic appearing  Psych:Normal/appropriate affect  HEENT:Ncat, oroph clear  Neck: neck supple, full range of motion  Neuro: Face symmetric, speech clear, equal , moves all extremities  Cardiac: Rrr; No pretibial pitting edema  Resp: Ctab, normal effort  GI: abd soft, nontender  Skin: trace BLE w/ bilateral chronic venous stasis  Musculoskeletal/extremities: no cyanosis extremities; no significant ankle edema            Results Reviewed:  I have personally reviewed current lab, radiology, and data and agree.      Results from last 7 days  Lab Units 18  0606 18  1637   WBC 10*3/mm3 3.29* 6.56   HEMOGLOBIN g/dL 10.4* 11.8   HEMATOCRIT % 30.6* 35.0   PLATELETS 10*3/mm3 149* 158       Results from last 7 days  Lab Units 18  0606 18  1637   SODIUM mmol/L 129* 127*   POTASSIUM mmol/L 4.9 4.9   CHLORIDE mmol/L 97* 91*   CO2 mmol/L 20.0 19.0*   BUN mg/dL 36* 38*   CREATININE mg/dL 1.95* 2.12*   GLUCOSE mg/dL 131* 98   CALCIUM mg/dL 7.6* 8.4*   ALT (SGPT) U/L  --  37   AST (SGOT) U/L  --  31     Estimated Creatinine Clearance: 16.9 mL/min (A) (by C-G formula based on SCr of 1.95 mg/dL (H)).  BNP   Date Value Ref Range Status   2018 135.0 (H) 0.0 - 100.0 pg/mL Final      Comment:     Results may be falsely decreased if patient taking Biotin.       Microbiology Results Abnormal     None          Imaging Results (last 24 hours)     Procedure Component Value Units Date/Time    XR Chest 1 View [[id number]] Collected:  09/13/18 1644     Updated:  09/13/18 1649    Narrative:       EXAMINATION: XR CHEST 1 VW- 09/13/2018     INDICATION: Weak/Dizzy/AMS triage protocol      COMPARISON: 08/13/2018     FINDINGS: Cardiac silhouette is normal. There are chronic pulmonary  changes. There is no acute inflammatory process, mass or effusion.           Impression:       Chronic change; no acute findings.     D:  09/13/2018  E:  09/13/2018     This report was finalized on 9/13/2018 4:47 PM by Dr. Wild Hill MD.                I have reviewed the medications.      dexamethasone 4 mg Intravenous Q6H   ertapenem 500 mg Intravenous Q24H   famotidine 20 mg Oral Daily   [START ON 9/15/2018] furosemide 20 mg Oral Every Other Day   heparin (porcine) 5,000 Units Subcutaneous Q12H   multivitamin with minerals 1 tablet Oral Daily   pantoprazole 40 mg Oral Q AM   potassium chloride 10 mEq Oral Daily   sertraline 50 mg Oral Daily   sodium chloride 500 mL Intravenous Once         Assessment/Plan   Assessment / Plan     Hospital Problem List     * (Principal)Acute renal failure (CMS/HCC)    UTI (urinary tract infection)    Hyponatremia    Dehydration    Essential hypertension    Irritable bowel syndrome with both constipation and diarrhea    FTT (failure to thrive) in adult    Bereavement    Anemia             Brief Hospital Course to date:  Dacia Nelson is a 89 y.o. female       Assessment & Plan:  -tsh normal  -duplex BLE prelmin negative (follow official)  -invanz day 1/3 (pcn allergy, ? Rxn), follow urine culture  -normal saline, hydrate (baseline cr 1.2)  -antiemetics  -anemia labs  -dietition consult  -pt ot eval    -cbc, bmp, b12    -will benefit from rehab at d/c    DVT Prophylaxis:  Sq heparin    CODE  STATUS:   Code Status and Medical Interventions:   Ordered at: 09/13/18 2001     Limited Support to NOT Include:    Intubation    Dialysis    Cardioversion/Defibrillation    Artificial Nutrition     Level Of Support Discussed With:    Patient     Code Status:    No CPR     Medical Interventions (Level of Support Prior to Arrest):    Limited       Disposition: I expect the patient to be discharged to rehab      Electronically signed by Igor Fairbanks MD, 09/14/18, 8:56 AM.

## 2018-09-14 NOTE — PROGRESS NOTES
Discharge Planning Assessment  [facility]     Patient Name: Dacia Nelson  MRN: [id number]  Today's Date: 9/13/2018    Admit Date: 9/13/2018          Discharge Needs Assessment     Row Name 09/13/18 2004       Living Environment    Lives With alone   pt resides in [facility]    Current Living Arrangements home/apartment/condo    Primary Care Provided by self    Provides Primary Care For no one    Family Caregiver if Needed child(shaunna), adult    Family Caregiver Names daughter- Mandy Peddicord    Quality of Family Relationships helpful;involved;supportive    Able to Return to Prior Arrangements yes       Resource/Environmental Concerns    Resource/Environmental Concerns none       Transition Planning    Patient/Family Anticipates Transition to other (see comments)   TBD    Patient/Family Anticipated Services at Transition     Transportation Anticipated family or friend will provide       Discharge Needs Assessment    Readmission Within the Last 30 Days no previous admission in last 30 days    Concerns to be Addressed discharge planning    Equipment Currently Used at Home walker, rolling;grab bar;other (see comments)   elevated toilet    Anticipated Changes Related to Illness inability to care for self    Equipment Needed After Discharge none    Outpatient/Agency/Support Group Needs assisted living facility;skilled nursing facility   family and pt discussing discharge options    Discharge Facility/Level of Care Needs assisted living facility;nursing facility, skilled    Offered/Gave Vendor List yes    Patient's Choice of Community Agency(s) Pt's daughter has list of facilitites and considering [facility] assisted living            Discharge Plan     Row Name 09/13/18 2008       Plan    Plan TBD    Patient/Family in Agreement with Plan yes    Plan Comments CM spoke with pt and daughter Mandy at bedside. Pt has been declining for approx 3 weeks and has been considering  possible assisted living as she currently lives alone. Pt's daughter has been assisting and in contact with a few facilites one being [facility] [employer] Pt is still undecided on plans at this time. CM spoke with them about options including skilled rehab to become stronger then transition back to home verse assisted living. Pt and daughter are going to discuss options and CM will follow up with them tomorrow.         Destination     No service coordination in this encounter.      Durable Medical Equipment     No service coordination in this encounter.      Dialysis/Infusion     No service coordination in this encounter.      Home Medical Care     No service coordination in this encounter.      Social Care     No service coordination in this encounter.                Demographic Summary     Row Name 09/13/18 1953       General Information    Admission Type inpatient    Referral Source admission list    Preferred Language English    General Information Comments PCP- PREMA Leal       Contact Information    Permission Granted to Share Info With             Functional Status     Row Name 09/13/18 1954       Functional Status    Usual Activity Tolerance fair    Current Activity Tolerance poor       Functional Status, IADL    Medications independent    Meal Preparation independent    Housekeeping assistive person    Laundry assistive person    Shopping independent    IADL Comments pt was independent going to grocery and taking care of herself until the last 3 weeks       Employment/    Employment/ Comments pt confirms she has Medicare and [employer] supplemental, pt denies concerns or disruption in coverage. Pt confirms she has prescription drug coverage and denies issues obtaining or affording current meds            Psychosocial    No documentation.           Abuse/Neglect    No documentation.           Legal    No documentation.           Substance Abuse    No  documentation.           Patient Forms    No documentation.         Ronel Goodwin

## 2018-09-14 NOTE — PROGRESS NOTES
Continued Stay Note  [facility]     Patient Name: Dacia Nelson  MRN: [id number]  Today's Date: 9/14/2018    Admit Date: 9/13/2018          Discharge Plan     Row Name 09/14/18 1220       Plan    Plan skilled placement for rehab    Plan Comments I spoke with patient and her daughter Mandy. List of skilled nursing facilities is given. Referrals made to [facility] and to [facility]. I will see again on Monday.               Discharge Codes    No documentation.       Expected Discharge Date and Time     Expected Discharge Date Expected Discharge Time    Sep 17, 2018             Cherelle Loya RN

## 2018-09-14 NOTE — THERAPY EVALUATION
Acute Care - Physical Therapy Initial Evaluation  [facility]     Patient Name: Dacia Nelson  : 1925  MRN: [id number]  Today's Date: 2018   Onset of Illness/Injury or Date of Surgery: 18  Date of Referral to PT: 18  Referring Physician: LEXI Rodarte      Admit Date: 2018    Visit Dx:     ICD-10-CM ICD-9-CM   1. Acute renal failure, unspecified acute renal failure type (CMS/HCC) N17.9 584.9   2. Dehydration with hyponatremia E87.1 276.1   3. Adult failure to thrive syndrome R62.7 783.7   4. Malnutrition compromising bodily function (CMS/[facility]) E46 263.9   5. Dependent edema R60.9 782.3   6. Impaired functional mobility, balance, gait, and endurance Z74.09 V49.89     Patient Active Problem List   Diagnosis   • Essential hypertension   • Irritable bowel syndrome with both constipation and diarrhea   • Primary osteoarthritis involving multiple joints   • Acute renal failure (CMS/HCC)   • FTT (failure to thrive) in adult   • Hyponatremia   • Dehydration   • UTI (urinary tract infection)   • Bereavement   • Anemia     Past Medical History:   Diagnosis Date   • Arthritis    • Cellulitis    • Gastritis      Past Surgical History:   Procedure Laterality Date   • CHOLECYSTECTOMY     • HYSTERECTOMY          PT ASSESSMENT (last 12 hours)      Physical Therapy Evaluation     Row Name 18 0814          PT Evaluation Time/Intention    Subjective Information complains of;weakness  -KR     Document Type evaluation  -KR     Mode of Treatment physical therapy  -KR     Patient Effort good  -KR     Symptoms Noted During/After Treatment none  -KR     Row Name 1814          General Information    Patient Profile Reviewed? yes  -KR     Onset of Illness/Injury or Date of Surgery 18  -KR     Referring Physician LEXI Rodarte  -KR     Patient Observations alert;cooperative;agree to therapy  -KR     Prior Level of Function independent:;all household  mobility;gait;transfer;ADL's;dressing;bathing  -KR     Equipment Currently Used at Home rollator;grab bar  -KR     Pertinent History of Current Functional Problem Pt presents to the ED with c/o generalized weakness. Pt reports declining health since her  passed away in April. Pt has had loss of appetite, muscle aches, nausea, generalized weakness with difficulty walking, and LE edema.   -KR     Existing Precautions/Restrictions fall  -KR     Limitations/Impairments hearing  -KR     Risks Reviewed patient:;LOB;dizziness;increased discomfort;change in vital signs  -KR     Benefits Reviewed patient:;improve function;increase independence;increase strength;increase balance  -KR     Barriers to Rehab none identified  -KR     Row Name 09/14/18 0814          Relationship/Environment    Lives With alone  -KR     Row Name 09/14/18 0814          Resource/Environmental Concerns    Current Living Arrangements home/apartment/condo  -KR     Resource/Environmental Concerns none  -KR     Row Name 09/14/18 0814          Cognitive Assessment/Interventions    Additional Documentation Cognitive Assessment/Intervention (Group)  -KR     Row Name 09/14/18 0814          Cognitive Assessment/Intervention- PT/OT    Affect/Mental Status (Cognitive) WFL  -KR     Orientation Status (Cognition) oriented x 4  -KR     Follows Commands (Cognition) follows one step commands;over 90% accuracy;repetition of directions required;verbal cues/prompting required  -KR     Cognitive Function (Cognitive) safety deficit  -KR     Safety Deficit (Cognitive) mild deficit;awareness of need for assistance;insight into deficits/self awareness;safety precautions awareness;safety precautions follow-through/compliance  -KR     Personal Safety Interventions fall prevention program maintained;gait belt;nonskid shoes/slippers when out of bed  -KR     Row Name 09/14/18 0814          Safety Issues, Functional Mobility    Impairments Affecting Function (Mobility)  balance;endurance/activity tolerance;strength  -KR     Row Name 09/14/18 0814          Bed Mobility Assessment/Treatment    Bed Mobility Assessment/Treatment sit-supine  -KR     Sit-Supine [place] (Bed Mobility) minimum assist (75% patient effort);2 person assist;verbal cues  -KR     Bed Mobility, Safety Issues decreased use of arms for pushing/pulling;decreased use of legs for bridging/pushing  -KR     Assistive Device (Bed Mobility) head of bed elevated  -KR     Comment (Bed Mobility) VC's for sequencing. Pt required assistance at trunk and BLEs.   -KR     Row Name 09/14/18 0814          Transfer Assessment/Treatment    Transfer Assessment/Treatment sit-stand transfer;stand-sit transfer  -KR     Comment (Transfers) VC's for sequencing and hand placement.   -KR     Sit-Stand [place] (Transfers) minimum assist (75% patient effort);2 person assist;verbal cues  -KR     Stand-Sit [place] (Transfers) minimum assist (75% patient effort);2 person assist;verbal cues  -KR     Row Name 09/14/18 0814          Sit-Stand Transfer    Assistive Device (Sit-Stand Transfers) walker, front-wheeled  -KR     Row Name 09/14/18 0814          Stand-Sit Transfer    Assistive Device (Stand-Sit Transfers) walker, front-wheeled  -KR     Row Name 09/14/18 0814          Gait/Stairs Assessment/Training    Gait/Stairs Assessment/Training gait/ambulation assistive device  -KR     [place] Level (Gait) minimum assist (75% patient effort);verbal cues  -KR     Assistive Device (Gait) walker, front-wheeled  -KR     Distance in Feet (Gait) 12  -KR     Pattern (Gait) step-through  -KR     Deviations/Abnormal Patterns (Gait) base of support, narrow;waylon decreased;other (see comments)   decreased step length  -KR     Bilateral Gait Deviations forward flexed posture;heel strike decreased  -KR     Comment (Gait/Stairs) Pt demonstrated step through gait pattern with slow waylon and narrow NACHO. Pt given VC's for upright posture and to  keep RW close with feet inside middle.   -KR     Row Name 09/14/18 0814          General ROM    GENERAL ROM COMMENTS BLE WFL   -KR     Row Name 09/14/18 0814          MMT (Manual Muscle Testing)    General MMT Comments BLE grossly 3+/5  -KR     Row Name 09/14/18 0814          Motor Assessment/Intervention    Additional Documentation Balance (Group)  -KR     Row Name 09/14/18 0814          Balance    Balance static sitting balance;static standing balance  -KR     Row Name 09/14/18 0814          Static Sitting Balance    Level of [place] (Unsupported Sitting, Static Balance) supervision  -KR     Sitting Position (Unsupported Sitting, Static Balance) sitting in chair  -KR     Row Name 09/14/18 0814          Static Standing Balance    Level of [place] (Supported Standing, Static Balance) contact guard assist  -KR     Assistive Device Utilized (Supported Standing, Static Balance) rolling walker  -CHANCE     Row Name 09/14/18 0814          Sensory Assessment/Intervention    Sensory General Assessment no sensation deficits identified  -CHANCE     Row Name 09/14/18 0814          Pain Assessment    Additional Documentation Pain Scale: Numbers Pre/Post-Treatment (Group)  -CHANCE     Row Name 09/14/18 0814          Pain Scale: Numbers Pre/Post-Treatment    Pain Scale: Numbers, Pretreatment 0/10 - no pain  -KR     Pain Scale: Numbers, Post-Treatment 0/10 - no pain  -KR     Row Name 09/14/18 0814          Plan of Care Review    Plan of Care Reviewed With patient  -CHANCE     Row Name 09/14/18 0814          Physical Therapy Clinical Impression    Date of Referral to PT 09/13/18  -KR     PT Diagnosis (PT Clinical Impression) impaired functional mobility  -KR     Patient/Family Goals Statement (PT Clinical Impression) return to PLOF  -KR     Criteria for Skilled Interventions Met (PT Clinical Impression) yes;treatment indicated  -KR     Rehab Potential (PT Clinical Summary) good, to achieve stated therapy goals  -KR     Care Plan Review  (PT) evaluation/treatment results reviewed;care plan/treatment goals reviewed;patient/other agree to care plan  -KR     Row Name 09/14/18 0814          Vital Signs    Pre Systolic BP Rehab --   no telemetry; RN cleared for treatment  -KR     O2 Delivery Pre Treatment room air  -KR     O2 Delivery Post Treatment room air  -KR     Pre Patient Position Sitting  -KR     Intra Patient Position Standing  -KR     Post Patient Position Supine  -KR     Row Name 09/14/18 0814          Physical Therapy Goals    Bed Mobility Goal Selection (PT) bed mobility, PT goal 1  -KR     Transfer Goal Selection (PT) transfer, PT goal 1  -KR     Gait Training Goal Selection (PT) gait training, PT goal 1  -KR     Row Name 09/14/18 0814          Bed Mobility Goal 1 (PT)    Activity/Assistive Device (Bed Mobility Goal 1, PT) bed mobility activities, all  -KR     [place] Level/Cues Needed (Bed Mobility Goal 1, PT) supervision required  -KR     Time Frame (Bed Mobility Goal 1, PT) 2 weeks  -KR     Progress/Outcomes (Bed Mobility Goal 1, PT) goal ongoing  -KR     Row Name 09/14/18 0814          Transfer Goal 1 (PT)    Activity/Assistive Device (Transfer Goal 1, PT) sit-to-stand/stand-to-sit;bed-to-chair/chair-to-bed;walker, rolling  -KR     [place] Level/Cues Needed (Transfer Goal 1, PT) supervision required  -KR     Time Frame (Transfer Goal 1, PT) 2 weeks  -KR     Progress/Outcome (Transfer Goal 1, PT) goal ongoing  -KR     Row Name 09/14/18 0814          Gait Training Goal 1 (PT)    Activity/Assistive Device (Gait Training Goal 1, PT) gait (walking locomotion);assistive device use;walker, rolling  -KR     [place] Level (Gait Training Goal 1, PT) contact guard assist  -KR     Distance (Gait Goal 1, PT) 100 feet  -KR     Time Frame (Gait Training Goal 1, PT) 2 weeks  -KR     Progress/Outcome (Gait Training Goal 1, PT) goal ongoing  -KR     Row Name 09/14/18 0814          Positioning and Restraints    Pre-Treatment Position  sitting in chair/recliner  -KR     Post Treatment Position bed  -KR     In Bed notified nsg;supine;call light within reach;encouraged to call for assist;exit alarm on;side rails up x2  -KR     Row Name 09/14/18 0814          Living Environment    Home Accessibility other (see comments)   no concerns; walk in shower  -KR       User Key  (r) = Recorded By, (t) = Taken By, (c) = Cosigned By    Initials Name Provider Type    KR Marylin Liu, PT Physical Therapist          Physical Therapy Education     Title: PT OT SLP Therapies (Active)     Topic: Physical Therapy (Active)     Point: Mobility training (Active)    Learning Progress Summary     Learner Status Readiness Method Response Comment Documented by    Patient Active Acceptance E NR  KR 09/14/18 0924          Point: Body mechanics (Active)    Learning Progress Summary     Learner Status Readiness Method Response Comment Documented by    Patient Active Acceptance E NR  KR 09/14/18 0924          Point: Precautions (Active)    Learning Progress Summary     Learner Status Readiness Method Response Comment Documented by    Patient Active Acceptance E NR  KR 09/14/18 0924                      User Key     Initials Effective Dates Name Provider Type [facility] 04/03/18 -  Marylin Liu, PT Physical Therapist PT                PT Recommendation and Plan  Anticipated Discharge Disposition (PT): skilled nursing facility  Planned Therapy Interventions (PT Eval): balance training, bed mobility training, gait training, strengthening, transfer training  Therapy Frequency (PT Clinical Impression): daily  Outcome Summary/Treatment Plan (PT)  Anticipated Discharge Disposition (PT): skilled nursing facility  Plan of Care Reviewed With: patient  Outcome Summary: PT initial evaluation completed for pt presenting with generalized weakness and decreased functional mobility. Pt ambulated 12ft with RW and [place]. Pt's decreased independence warrants PT skilled care. Recommend  D/C to SNF.           Outcome Measures     Row Name 09/14/18 0814             How much help from another person do you currently need...    Turning from your back to your side while in flat bed without using bedrails? 3  -KR      Moving from lying on back to sitting on the side of a flat bed without bedrails? 3  -KR      Moving to and from a bed to a chair (including a wheelchair)? 3  -KR      Standing up from a chair using your arms (e.g., wheelchair, bedside chair)? 3  -KR      Climbing 3-5 steps with a railing? 2  -KR      To walk in hospital room? 3  -KR      AM-PAC 6 Clicks Score 17  -KR         Functional Assessment    Outcome Measure Options AM-PAC 6 Clicks Basic Mobility (PT)  -KR        User Key  (r) = Recorded By, (t) = Taken By, (c) = Cosigned By    Initials Name Provider Type    Marylin Ahmadi, PT Physical Therapist           Time Calculation:         PT Charges     Row Name 09/14/18 0814             Time Calculation    Start Time 0814  -KR      PT Received On 09/14/18  -KR      PT Goal Re-Cert Due Date 09/24/18  -KR        User Key  (r) = Recorded By, (t) = Taken By, (c) = Cosigned By    Initials Name Provider Type    Marylin Ahmadi, PT Physical Therapist        Therapy Suggested Charges     Code   Minutes Charges    None           Therapy Charges for Today     Code Description Service Date Service Provider Modifiers Qty    [id number]  PT EVAL LOW COMPLEXITY 4 9/14/2018 Marylin Liu, PT GP 1    [id number] HC PT THER SUPP EA 15 MIN 9/14/2018 Marylin Liu, PT GP 2          PT G-Codes  Outcome Measure Options: AM-PAC 6 Clicks Basic Mobility (PT)  AM-PAC 6 Clicks Score: 17      Amanda Liu PT  9/14/2018

## 2018-09-14 NOTE — CONSULTS
"Clinical Nutrition   Reason For Visit: Nurse practioner/physician assistent consult, MST score 2+    Patient Name: Dacia Nelson  YOB: 1925  MRN: [id number]  Date of Encounter: 09/14/18 1:27 PM  Admission date: 9/13/2018      Nutrition Assessment     Hospital Problem List  Principal Problem:    Acute renal failure (CMS/HCC)  Active Problems:    Essential hypertension    Irritable bowel syndrome with both constipation and diarrhea    FTT (failure to thrive) in adult    Hyponatremia    Dehydration    UTI (urinary tract infection)    Bereavement    Anemia          PMH: She  has a past medical history of Arthritis; Cellulitis; and Gastritis.   PSxH: She  has a past surgical history that includes Hysterectomy and Cholecystectomy.             Reported/Observed/Food/Nutrition Related History     Pt is [place], family state pt lost her  in April 2018 and has not really wanted to cook for herself since that time. She will eat when family is with her but day to day intake has decreased. State pt will sleep 18 out of 24 hours  Pt states she does not like Ensure or Boost but is willing to try [place] Instant breakfast especially if mixed with ice cream. Reports portions are too big, overwhelming, will adjust portions to smaller to help with intake.   Family asking for a snack to be brought to keep on hand for pt.      Anthropometrics   Height: 62 \"  Weight: 154 lbs 8 oz-bed wt on admission  BMI: 28.26  BMI classification: Overweight: 25.0-29.9kg/m2    UBW: 162 lbs  IBW: 110 lbs      Weight change: Pt/family unable to give a weight loss amount.  State pt does not weight herself and she hasn't been in the hospital to be weighed in a long time.  Per EMR, pt has lost ~8 lbs in the last 10 months (5%)        Labs reviewed   Labs reviewed: Yes    Results from last 7 days  Lab Units 09/14/18  0606 09/13/18  1637   SODIUM mmol/L 129* 127*   POTASSIUM mmol/L 4.9 4.9   CHLORIDE mmol/L 97* 91*   CO2 mmol/L 20.0 19.0* "   BUN mg/dL 36* 38*   CREATININE mg/dL 1.95* 2.12*   GLUCOSE mg/dL 131* 98   CALCIUM mg/dL 7.6* 8.4*   MAGNESIUM mg/dL  --  2.0       Results from last 7 days  Lab Units 09/14/18  0606 09/13/18  1637   WBC 10*3/mm3 3.29* 6.56   ALBUMIN g/dL  --  3.04*         No results found for: HGBA1C  Medications reviewed   Medications reviewed: Yes      Intake/Ouptut 24 hrs (7:00AM - 6:59 AM)     Intake & Output (last day)       09/13 0701 - 09/14 0700 09/14 0701 - 09/15 0700    P.O. 0 100    IV Piggyback 500     Total Intake(mL/kg) 500 (7.1) 100 (1.4)    Urine (mL/kg/hr) 260 625 (1.4)    Total Output 260 625    Net +240 -525          Unmeasured Urine Occurrence 1 x             Current Nutrition Prescription   PO: Diet Regular; Cardiac      Evaluation of Received Nutrient/Fluid Intake:  Insufficient data       Nutrition Diagnosis     Problem Inadequate oral intake   Etiology Dehydration, environmental factors   Signs/Symptoms Reported decreased desire to eat, decreased appetite, mourning of 's passing in April       Intervention   Intervention: Follow treatment progress, Care plan reviewed, Advise alternate selection, Advised available snacks, Interview for preferences, Adjusted portion, Encourage intake, Supplement provided  1) [place] instant breakfast (chocolate) with lunch and dinner trays  2) Smaller portions at meals  3) PM snack    Goal:   General: Nutrition support treatment  PO: Establish PO, Tolerate PO        Monitoring/Evaluation:       Monitoring/Evaluation: Per protocol, I&O, PO intake, Supplement intake, Symptoms  Will Continue to follow per protocol  Emily Heck RD  Time Spent: 30 min

## 2018-09-14 NOTE — PLAN OF CARE
Problem: Patient Care Overview  Goal: Plan of Care Review  Outcome: Ongoing (interventions implemented as appropriate)   09/14/18 1620   Plan of Care Review   Progress improving   OTHER   Outcome Summary VSS. Pt has voided today without difficulty. Up to the bathroom with assist of 2. Gait very unsteady due to generalized weakness. Alert, pleasant w\ith occasional confused conversation.   Coping/Psychosocial   Plan of Care Reviewed With patient;daughter       Problem: Fall Risk (Adult)  Goal: Absence of Fall  Outcome: Ongoing (interventions implemented as appropriate)      Problem: Skin Injury Risk (Adult)  Goal: Skin Health and Integrity  Outcome: Ongoing (interventions implemented as appropriate)   09/14/18 1620   Skin Injury Risk (Adult)   Skin Health and Integrity making progress toward outcome

## 2018-09-14 NOTE — THERAPY EVALUATION
Acute Care - Occupational Therapy Initial Evaluation  [facility]     Patient Name: Dacia Nelson  : 1925  MRN: [id number]  Today's Date: 2018  Onset of Illness/Injury or Date of Surgery: 18  Date of Referral to OT: 18  Referring Physician: LEXI Rodarte    Admit Date: 2018       ICD-10-CM ICD-9-CM   1. Acute renal failure, unspecified acute renal failure type (CMS/HCC) N17.9 584.9   2. Dehydration with hyponatremia E87.1 276.1   3. Adult failure to thrive syndrome R62.7 783.7   4. Malnutrition compromising bodily function (CMS/[facility]) E46 263.9   5. Dependent edema R60.9 782.3   6. Impaired functional mobility, balance, gait, and endurance Z74.09 V49.89   7. Impaired mobility and ADLs Z74.09 799.89     Patient Active Problem List   Diagnosis   • Essential hypertension   • Irritable bowel syndrome with both constipation and diarrhea   • Primary osteoarthritis involving multiple joints   • Acute renal failure (CMS/HCC)   • FTT (failure to thrive) in adult   • Hyponatremia   • Dehydration   • UTI (urinary tract infection)   • Bereavement   • Anemia     Past Medical History:   Diagnosis Date   • Arthritis    • Cellulitis    • Gastritis      Past Surgical History:   Procedure Laterality Date   • CHOLECYSTECTOMY     • HYSTERECTOMY            OT ASSESSMENT FLOWSHEET (last 72 hours)      Occupational Therapy Evaluation     Row Name 18 1303                   OT Evaluation Time/Intention    Subjective Information complains of;weakness;pain  -TB        Document Type evaluation  -TB        Mode of Treatment occupational therapy;individual therapy  -TB        Patient Effort good  -TB        Symptoms Noted During/After Treatment fatigue;increased pain  -TB        Comment Pitting edema noted B LE  -TB           General Information    Patient Profile Reviewed? yes  -TB        Onset of Illness/Injury or Date of Surgery 18  -TB        Referring Physician LEXI Rodarte  -TB         Patient Observations alert;cooperative  -TB        Patient/Family Observations Friend present at bedside  -TB        General Observations of Patient Pt rec'vd in bed, room air, IV; exit alarm  -TB        Prior Level of Function independent:;all household mobility;transfer;ADL's;home management   pt sleeps on couch, u/a to complete bed mobility  -TB        Equipment Currently Used at Home grab bar;rollator  -TB        Pertinent History of Current Functional Problem Pt to ED with c/o worsening generalized weakness and B LE edema. Pt recently  and reports functional decline since his passing. Presents with poor appetite (ftt), nausea, weakness with difficulty walking  -TB        Existing Precautions/Restrictions fall  -TB        Limitations/Impairments hearing  -TB        Risks Reviewed patient:;LOB;increased discomfort;lines disloged  -TB        Benefits Reviewed patient:;improve function;increase strength;increase knowledge  -TB        Barriers to Rehab previous functional deficit  -TB           Relationship/Environment    Lives With alone  -TB        Family Caregiver if Needed child(shaunna), adult  -TB           Resource/Environmental Concerns    Current Living Arrangements home/apartment/condo  -TB           Cognitive Assessment/Intervention- PT/OT    Affect/Mental Status (Cognitive) anxious  -TB        Orientation Status (Cognition) oriented x 3  -TB        Follows Commands (Cognition) follows one step commands;75-90% accuracy;verbal cues/prompting required;repetition of directions required;physical/tactile prompts required  -TB        Cognitive Function (Cognitive) safety deficit  -TB        Attention Deficit (Cognitive) requires cues/redirection to task;distractible in quiet environment  -TB        Safety Deficit (Cognitive) insight into deficits/self awareness;awareness of need for assistance;mild deficit;impulsivity  -TB        Personal Safety Interventions fall prevention program maintained  -TB         Cognitive Assessment/Intervention Comment Reluctant to take direction  -TB           Safety Issues, Functional Mobility    Safety Issues Affecting Function (Mobility) awareness of need for assistance;insight into deficits/self awareness;impulsivity  -TB        Impairments Affecting Function (Mobility) balance;pain;strength  -TB        Comment, Safety Issues/Impairments (Mobility) mild anxiety/fear of falling; reluctant to take direction  -TB           Bed Mobility Assessment/Treatment    Bed Mobility Assessment/Treatment rolling right;supine-sit;sit-supine;scooting/bridging  -TB        Rolling Right [place] (Bed Mobility) contact guard;verbal cues  -TB        Scooting/Bridging [place] (Bed Mobility) minimum assist (75% patient effort);verbal cues  -TB        Supine-Sit [place] (Bed Mobility) minimum assist (75% patient effort);verbal cues  -TB        Sit-Supine [place] (Bed Mobility) moderate assist (50% patient effort);2 person assist  -TB        Bed Mobility, Safety Issues decreased use of arms for pushing/pulling;decreased use of legs for bridging/pushing  -TB        Assistive Device (Bed Mobility) bed rails;head of bed elevated  -TB        Comment (Bed Mobility) cues for sequencing  -TB           Functional Mobility    Functional Mobility- Ind. Level minimum assist (75% patient effort);2 person assist required;verbal cues required  -TB        Functional Mobility-Distance (Feet) 20  -TB        Functional Mobility- Safety Issues step length decreased;sequencing ability decreased;balance decreased during turns  -TB        Functional Mobility- Comment gait belt and B UE supported; EOB to BR and return  -TB           Transfer Assessment/Treatment    Transfer Assessment/Treatment sit-stand transfer;stand-sit transfer;toilet transfer;bed-chair transfer  -TB        Comment (Transfers) Pt refuses [facility]; max cues for sequencing and assist to protect lines  -TB           Sit-Stand Transfer    Sit-Stand  [place] (Transfers) minimum assist (75% patient effort);2 person assist;verbal cues  -TB           Stand-Sit Transfer    Stand-Sit [place] (Transfers) minimum assist (75% patient effort);2 person assist;verbal cues  -TB           Toilet Transfer    Type (Toilet Transfer) sit-stand;stand-sit  -TB        [place] Level (Toilet Transfer) minimum assist (75% patient effort);2 person assist;verbal cues  -TB           ADL Assessment/Intervention    24005 - OT Self Care/Mgmt Minutes 12  -TB        BADL Assessment/Intervention upper body dressing;grooming;toileting  -TB           Upper Body Dressing Assessment/Training    Upper Body Dressing [place] Level don;pajama/robe;moderate assist (50% patient effort);verbal cues  -TB        Upper Body Dressing Position edge of bed sitting  -TB        Comment (Upper Body Dressing) assist for lines; cues to orient to garment  -TB           Grooming Assessment/Training    [place] Level (Grooming) set up;hair care, combing/brushing   to wash hands after toileting  -TB        Grooming Position edge of bed sitting  -TB        Comment (Grooming) cues to attend to tasks  -TB           Toileting Assessment/Training    [place] Level (Toileting) toileting skills;moderate assist (50% patient effort);verbal cues  -TB        Assistive Devices (Toileting) grab bar/safety frame;raised toilet seat  -TB        Comment (Toileting) assist for clothing mgmt; pt completes hygiene  -TB           BADL Safety/Performance    Impairments, BADL Safety/Performance balance;pain;strength  -TB           General ROM    GENERAL ROM COMMENTS B UE AROM WFL for age and ADL  -TB           MMT (Manual Muscle Testing)    General MMT Comments B Shoulders 3/5, remaining 3+/5  -TB           Motor Assessment/Interventions    Additional Documentation Balance (Group);Therapeutic Exercise (Group)  -TB           Therapeutic Exercise    Therapeutic Exercise seated, upper extremities  -TB         Additional Documentation Therapeutic Exercise (Row)  -TB           Upper Extremity Seated Therapeutic Exercise    Performed, Seated Upper Extremity (Therapeutic Exercise) shoulder flexion/extension;shoulder abduction/adduction;shoulder horizontal abduction/adduction;elbow flexion/extension;wrist flexion/extension;digit flexion/extension  -TB        Exercise Type, Seated Upper Extremity (Therapeutic Exercise) AROM (active range of motion)  -TB        Expected Outcomes, Seated Upper Extremity (Therapeutic Exercise) improve functional tolerance, self-care activity;improve performance, BADLs  -TB        Restrictions, Seated Upper Extremity (Therapeutic Exercise) advanced age  -TB        Sets/Reps Detail, Seated Upper Extremity (Therapeutic Exercise) 10  -TB           Balance    Balance static sitting balance;static standing balance;dynamic sitting balance;dynamic standing balance  -TB           Static Sitting Balance    Level of [place] (Unsupported Sitting, Static Balance) supervision  -TB           Dynamic Sitting Balance    Level of [place], Reaches Outside Midline (Sitting, Dynamic Balance) contact guard assist  -TB        Sitting Position, Reaches Outside Midline (Sitting, Dynamic Balance) sitting on edge of bed  -TB        Comment, Reaches Outside Midline (Sitting, Dynamic Balance) ADL tasks at EOB; >5 min  -TB           Static Standing Balance    Level of [place] (Supported Standing, Static Balance) minimal assist, 75% patient effort  -TB        Time Able to Maintain Position (Supported Standing, Static Balance) 2 to 3 minutes  -TB           Dynamic Standing Balance    Level of [place], Reaches Outside Midline (Standing, Dynamic Balance) minimal assist, 75% patient effort   2 person assist  -TB        Time Able to Maintain Position, Reaches Outside Midline (Standing, Dynamic Balance) 2 to 3 minutes  -TB        Comment, Reaches Outside Midline (Standing, Dynamic Balance) Gait belt and B UE  supported; ADL tasks  -TB           Sensory Assessment/Intervention    Sensory General Assessment no sensation deficits identified   B UE intact  -TB           Positioning and Restraints    Pre-Treatment Position in bed  -TB        Post Treatment Position bed  -TB        In Bed supine;call light within reach;encouraged to call for assist;with family/caregiver;legs elevated  -TB           Pain Assessment    Additional Documentation Pain Scale: FACES Pre/Post-Treatment (Group)  -TB           Pain Scale: Numbers Pre/Post-Treatment    Pain Location - Side Bilateral  -TB        Pain Location - Orientation lower  -TB        Pain Location extremity  -TB        Pain Intervention(s) Repositioned;Ambulation/increased activity  -TB           Pain Scale: FACES Pre/Post-Treatment    Pain: FACES Scale, Pretreatment 2-->hurts little bit  -TB        Pain: FACES Scale, Post-Treatment 4-->hurts little more  -TB        Pre/Post Treatment Pain Comment increased pain with touch/pressure and change of position  -TB           Edema Assessment & Management    Location (Edema) lower extremity, left;lower extremity, right  -TB        Additional Documentation Edema Symptoms/Interventions (Group);Location (Edema) (Row)  -TB           Lower Extremity Edema Measures, Left    Lower Extremity, Left (Edema) --   foot to knee  -TB           Lower Extremity Edema Measures, Right    Lower Extremity, Right (Edema) --   foot to knee  -TB           Edema Symptoms/Interventions    Description (Edema) pitting  -TB        Associated Symptoms (Edema) skin color changes;pain  -TB        Treatment Interventions (Edema) active range of motion   ankle pumps  -TB           Coping    Observed Emotional State anxious;cooperative  -TB        Verbalized Emotional State acceptance  -TB           Plan of Care Review    Plan of Care Reviewed With patient;friend  -TB           Clinical Impression (OT)    Date of Referral to OT 09/13/18  -TB        OT Diagnosis Impaired  mobility and ADL  -TB        Patient/Family Goals Statement (OT Eval) Pt requesting rehab at d/c  -TB        Criteria for Skilled Therapeutic Interventions Met (OT Eval) yes;treatment indicated  -TB        Rehab Potential (OT Eval) fair, will monitor progress closely  -TB        Therapy Frequency (OT Eval) daily  -TB        Care Plan Review (OT) evaluation/treatment results reviewed;patient/other agree to care plan  -TB        Care Plan Review, Other Participant (OT Eval) friend  -TB        Anticipated Equipment Needs at Discharge (OT) --   TBD; defer to rehab facility  -TB        Anticipated Discharge Disposition (OT) skilled nursing facility  -TB           Vital Signs    Pre Systolic BP Rehab --   RN cleared OT  -TB        O2 Delivery Post Treatment room air  -TB        Pre Patient Position Supine  -TB        Intra Patient Position Standing  -TB        Post Patient Position Supine  -TB           Planned OT Interventions    Planned Therapy Interventions (OT Eval) transfer/mobility retraining;occupation/activity based interventions;adaptive equipment training  -TB           OT Goals    Transfer Goal Selection (OT) transfer, OT goal 1  -TB        Bathing Goal Selection (OT) bathing, OT goal 1  -TB        Dressing Goal Selection (OT) dressing, OT goal 1  -TB        Functional Mobility Goal Selection (OT) functional mobility, OT goal 1  -TB           Transfer Goal 1 (OT)    Activity/Assistive Device (Transfer Goal 1, OT) sit-to-stand/stand-to-sit;toilet;walker, rolling  -TB        [place] Level/Cues Needed (Transfer Goal 1, OT) minimum assist (75% or more patient effort);verbal cues required  -TB        Time Frame (Transfer Goal 1, OT) by discharge  -TB        Barriers (Transfers Goal 1, OT) strength, balance; fear of falling  -TB        Progress/Outcome (Transfer Goal 1, OT) goal ongoing  -TB           Bathing Goal 1 (OT)    Activity/Assistive Device (Bathing Goal 1, OT) lower body bathing;long-handled  sponge;shower chair   simulated activity  -TB        [place] Level/Cues Needed (Bathing Goal 1, OT) set-up required  -TB        Time Frame (Bathing Goal 1, OT) by discharge  -TB        Barriers (Bathing Goal 1, OT) strength, balance  -TB           Dressing Goal 1 (OT)    Activity/Assistive Device (Dressing Goal 1, OT) lower body dressing;reacher;sock-aid   AE TBD - ?skin integrity - risk vs benefit   -TB        [place]/Cues Needed (Dressing Goal 1, OT) maximum assist (25-49% patient effort);verbal cues required  -TB        Time Frame (Dressing Goal 1, OT) by discharge  -TB        Barriers (Dressing Goal 1, OT) strength, balance  -TB        Progress/Outcome (Dressing Goal 1, OT) goal ongoing  -TB           Functional Mobility Goal 1 (OT)    Activity/Assistive Device (Functional Mobility Goal 1, OT) walker, rolling  -TB        [place] Level/Cues Needed (Functional Mobility Goal 1, OT) minimum assist (75% or more patient effort);verbal cues required  -TB        Distance Goal 1 (Functional Mobility, OT) to BR, in room; household distances  -TB        Time Frame (Functional Mobility Goal 1, OT) by discharge  -TB        Barriers (Functional Mobility Goal 1, OT) strength, balance  -TB        Progress/Outcome (Functional Mobility Goal 1, OT) goal ongoing  -TB           Living Environment    Home Accessibility --   No concerns - walk-in shower  -TB          User Key  (r) = Recorded By, (t) = Taken By, (c) = Cosigned By    Initials Name Effective Dates    TB Shelbie Jonas, OT 06/08/18 -            Occupational Therapy Education     Title: PT OT SLP Therapies (Active)     Topic: Occupational Therapy (Done)     Point: ADL training (Done)     Description: Instruct learner(s) on proper safety adaptation and remediation techniques during self care or transfers.   Instruct in proper use of assistive devices.   Learning Progress Summary     Learner Status Readiness Method Response Comment Documented by     Patient Done Acceptance E VU,NR Education initiated for benefits of OOB activity/therapy, role of OT, transfer training/safety and recommendation for SNF at d/c  09/14/18 1411    Other Done Acceptance E VU,NR Education initiated for benefits of OOB activity/therapy, role of OT, transfer training/safety and recommendation for SNF at d/c  09/14/18 1411                      User Key     Initials Effective Dates Name Provider Type Discipline     06/08/18 -  Shelbie Jonas, OT Occupational Therapist OT                  OT Recommendation and Plan  Outcome Summary/Treatment Plan (OT)  Anticipated Equipment Needs at Discharge (OT):  (TBD; defer to rehab facility)  Anticipated Discharge Disposition (OT): skilled nursing facility  Planned Therapy Interventions (OT Eval): transfer/mobility retraining, occupation/activity based interventions, adaptive equipment training  Therapy Frequency (OT Eval): daily  Plan of Care Review  Plan of Care Reviewed With: patient, friend  Plan of Care Reviewed With: patient, friend  Outcome Summary: OT IE completed. Pt presents with anxiety, generalized weakness and decreased balance limiting safety, mobility and ADL performance. Mod A x2 bed mobility. Min A x2 STS/toilet transfer and fx'l mobility. OT to follow. Recommend SNF at d/c for best outcome. Pt requests assist with transition to retirement.            Outcome Measures     Row Name 09/14/18 1303 09/14/18 0814          How much help from another person do you currently need...    Turning from your back to your side while in flat bed without using bedrails?  -- 3  -KR     Moving from lying on back to sitting on the side of a flat bed without bedrails?  -- 3  -KR     Moving to and from a bed to a chair (including a wheelchair)?  -- 3  -KR     Standing up from a chair using your arms (e.g., wheelchair, bedside chair)?  -- 3  -KR     Climbing 3-5 steps with a railing?  -- 2  -KR     To walk in hospital room?  -- 3  -KR     AM-PAC 6  Clicks Score  -- 17  -KR        How much help from another is currently needed...    Putting on and taking off regular lower body clothing? 1  -TB  --     Bathing (including washing, rinsing, and drying) 2  -TB  --     Toileting (which includes using toilet bed pan or urinal) 2  -TB  --     Putting on and taking off regular upper body clothing 3  -TB  --     Taking care of personal grooming (such as brushing teeth) 3  -TB  --     Eating meals 3  -TB  --     Score 14  -TB  --        Functional Assessment    Outcome Measure Options AM-PAC 6 Clicks Daily Activity (OT)  -TB AM-PAC 6 Clicks Basic Mobility (PT)  -KR       User Key  (r) = Recorded By, (t) = Taken By, (c) = Cosigned By    Initials Name Provider Type    Shelbie Tavarez, OT Occupational Therapist    KR Marylin Liu, PT Physical Therapist          Time Calculation:         Time Calculation- OT     Row Name 09/14/18 1414 09/14/18 1303          Time Calculation- OT    OT Start Time 1303  -TB  --     OT Received On 09/14/18  -TB  --     OT Goal Re-Cert Due Date 09/24/18  -TB  --        Timed Charges    42099 - OT Self Care/Mgmt Minutes  -- 12  -TB       User Key  (r) = Recorded By, (t) = Taken By, (c) = Cosigned By    Initials Name Provider Type    Shelbie Tavarez, OT Occupational Therapist        Therapy Suggested Charges     Code   Minutes Charges    71679 (CPT®) Hc Ot Neuromusc Re Education Ea 15 Min      18710 (CPT®) Hc Ot Ther Proc Ea 15 Min      40794 (CPT®) Hc Ot Therapeutic Act Ea 15 Min      24620 (CPT®) Hc Ot Manual Therapy Ea 15 Min      18553 (CPT®) Hc Ot Iontophoresis Ea 15 Min      33966 (CPT®) Hc Ot Elec Stim Ea-Per 15 Min      56485 (CPT®) Hc Ot Ultrasound Ea 15 Min      29050 (CPT®) Hc Ot Self Care/Mgmt/Train Ea 15 Min 12 1    Total  12 1        Therapy Charges for Today     Code Description Service Date Service Provider Modifiers Qty    96670922675 HC OT SELF CARE/MGMT/TRAIN EA 15 MIN 9/14/2018 Shelbie Jonas OT  GO 1    84712473661 HC OT EVAL MOD COMPLEXITY 4 9/14/2018 Shelbie Jonas, M[place] GO 1               Shelbie Jonas OT  9/14/2018

## 2018-09-14 NOTE — PLAN OF CARE
Problem: Patient Care Overview  Goal: Plan of Care Review  Outcome: Ongoing (interventions implemented as appropriate)   09/14/18 8849   OTHER   Outcome Summary OT IE completed. Pt presents with anxiety, generalized weakness and decreased balance limiting safety, mobility and ADL performance. Mod A x2 bed mobility. Min A x2 STS/toilet transfer and fx'l mobility. OT to follow. Recommend SNF at d/c for best outcome. Pt requests assist with transition to [facility].    Coping/Psychosocial   Plan of Care Reviewed With patient;friend

## 2018-09-14 NOTE — PLAN OF CARE
Problem: Patient Care Overview  Goal: Plan of Care Review  Outcome: Ongoing (interventions implemented as appropriate)   09/14/18 1210   OTHER   Outcome Summary PT initial evaluation completed for pt presenting with generalized weakness and decreased functional mobility. Pt ambulated 12ft with RW and Chelsea. Pt's decreased independence warrants PT skilled care. Recommend D/C to SNF.    Coping/Psychosocial   Plan of Care Reviewed With patient

## 2018-09-15 LAB
ANION GAP SERPL CALCULATED.3IONS-SCNC: 8 MMOL/L (ref 3–11)
BACTERIA SPEC AEROBE CULT: ABNORMAL
BUN BLD-MCNC: 36 MG/DL (ref 9–23)
BUN/CREAT SERPL: 22.8 (ref 7–25)
CALCIUM SPEC-SCNC: 7.7 MG/DL (ref 8.7–10.4)
CHLORIDE SERPL-SCNC: 101 MMOL/L (ref 99–109)
CO2 SERPL-SCNC: 21 MMOL/L (ref 20–31)
CREAT BLD-MCNC: 1.58 MG/DL (ref 0.6–1.3)
DEPRECATED RDW RBC AUTO: 56.5 FL (ref 37–54)
ERYTHROCYTE [DISTWIDTH] IN BLOOD BY AUTOMATED COUNT: 15.5 % (ref 11.3–14.5)
GFR SERPL CREATININE-BSD FRML MDRD: 31 ML/MIN/1.73
GLUCOSE BLD-MCNC: 119 MG/DL (ref 70–100)
HCT VFR BLD AUTO: 29.9 % (ref 34.5–44)
HGB BLD-MCNC: 9.9 G/DL (ref 11.5–15.5)
IRON 24H UR-MRATE: 78 MCG/DL (ref 50–175)
IRON SATN MFR SERPL: 59 % (ref 15–50)
MAGNESIUM SERPL-MCNC: 1.9 MG/DL (ref 1.3–2.7)
MCH RBC QN AUTO: 33.4 PG (ref 27–31)
MCHC RBC AUTO-ENTMCNC: 33.1 G/DL (ref 32–36)
MCV RBC AUTO: 101 FL (ref 80–99)
PLATELET # BLD AUTO: 164 10*3/MM3 (ref 150–450)
PMV BLD AUTO: 11 FL (ref 6–12)
POTASSIUM BLD-SCNC: 4.6 MMOL/L (ref 3.5–5.5)
RBC # BLD AUTO: 2.96 10*6/MM3 (ref 3.89–5.14)
SODIUM BLD-SCNC: 130 MMOL/L (ref 132–146)
TIBC SERPL-MCNC: 132 MCG/DL (ref 250–450)
WBC NRBC COR # BLD: 5.63 10*3/MM3 (ref 3.5–10.8)

## 2018-09-15 PROCEDURE — 83550 IRON BINDING TEST: CPT | Performed by: INTERNAL MEDICINE

## 2018-09-15 PROCEDURE — 83735 ASSAY OF MAGNESIUM: CPT | Performed by: INTERNAL MEDICINE

## 2018-09-15 PROCEDURE — 80048 BASIC METABOLIC PNL TOTAL CA: CPT | Performed by: INTERNAL MEDICINE

## 2018-09-15 PROCEDURE — 83540 ASSAY OF IRON: CPT | Performed by: INTERNAL MEDICINE

## 2018-09-15 PROCEDURE — 82607 VITAMIN B-12: CPT | Performed by: INTERNAL MEDICINE

## 2018-09-15 PROCEDURE — 85027 COMPLETE CBC AUTOMATED: CPT | Performed by: INTERNAL MEDICINE

## 2018-09-15 PROCEDURE — 25010000002 HEPARIN (PORCINE) PER 1000 UNITS: Performed by: INTERNAL MEDICINE

## 2018-09-15 PROCEDURE — 25010000002 ERTAPENEM PER 500 MG: Performed by: INTERNAL MEDICINE

## 2018-09-15 PROCEDURE — 99232 SBSQ HOSP IP/OBS MODERATE 35: CPT | Performed by: INTERNAL MEDICINE

## 2018-09-15 PROCEDURE — 25010000002 DEXAMETHASONE PER 1 MG: Performed by: NURSE PRACTITIONER

## 2018-09-15 RX ADMIN — HEPARIN SODIUM 5000 UNITS: 5000 INJECTION, SOLUTION INTRAVENOUS; SUBCUTANEOUS at 20:39

## 2018-09-15 RX ADMIN — MULTIPLE VITAMINS W/ MINERALS TAB 1 TABLET: TAB ORAL at 10:08

## 2018-09-15 RX ADMIN — DEXAMETHASONE SODIUM PHOSPHATE 4 MG: 4 INJECTION, SOLUTION INTRAMUSCULAR; INTRAVENOUS at 05:16

## 2018-09-15 RX ADMIN — DEXAMETHASONE SODIUM PHOSPHATE 4 MG: 4 INJECTION, SOLUTION INTRAMUSCULAR; INTRAVENOUS at 17:20

## 2018-09-15 RX ADMIN — FUROSEMIDE 20 MG: 20 TABLET ORAL at 10:08

## 2018-09-15 RX ADMIN — SERTRALINE HYDROCHLORIDE 50 MG: 50 TABLET ORAL at 10:00

## 2018-09-15 RX ADMIN — HEPARIN SODIUM 5000 UNITS: 5000 INJECTION, SOLUTION INTRAVENOUS; SUBCUTANEOUS at 10:08

## 2018-09-15 RX ADMIN — DEXAMETHASONE SODIUM PHOSPHATE 4 MG: 4 INJECTION, SOLUTION INTRAMUSCULAR; INTRAVENOUS at 12:00

## 2018-09-15 RX ADMIN — PANTOPRAZOLE SODIUM 40 MG: 40 TABLET, DELAYED RELEASE ORAL at 05:16

## 2018-09-15 RX ADMIN — POTASSIUM CHLORIDE 10 MEQ: 750 CAPSULE, EXTENDED RELEASE ORAL at 10:08

## 2018-09-15 RX ADMIN — DEXAMETHASONE SODIUM PHOSPHATE 4 MG: 4 INJECTION, SOLUTION INTRAMUSCULAR; INTRAVENOUS at 01:16

## 2018-09-15 RX ADMIN — FAMOTIDINE 20 MG: 20 TABLET, FILM COATED ORAL at 10:08

## 2018-09-15 RX ADMIN — ERTAPENEM SODIUM 500 MG: 1 INJECTION, POWDER, LYOPHILIZED, FOR SOLUTION INTRAMUSCULAR; INTRAVENOUS at 08:39

## 2018-09-15 NOTE — PLAN OF CARE
Problem: Skin Injury Risk (Adult)  Goal: Identify Related Risk Factors and Signs and Symptoms  Outcome: Ongoing (interventions implemented as appropriate)   09/15/18 1558   Skin Injury Risk (Adult)   Related Risk Factors (Skin Injury Risk) advanced age;cognitive impairment;edema;medication;mobility impaired   Pt depressed, lonely without . resting comfortably.

## 2018-09-15 NOTE — PLAN OF CARE
Problem: Patient Care Overview  Goal: Plan of Care Review  Outcome: Ongoing (interventions implemented as appropriate)   09/14/18 2000 09/15/18 0536   OTHER   Outcome Summary --  vss, pt rested well this shift,alert   Coping/Psychosocial   Plan of Care Reviewed With patient --      Goal: Discharge Needs Assessment  Outcome: Ongoing (interventions implemented as appropriate)    Goal: Interprofessional Rounds/Family Conf  Outcome: Ongoing (interventions implemented as appropriate)      Problem: Fall Risk (Adult)  Goal: Identify Related Risk Factors and Signs and Symptoms  Outcome: Ongoing (interventions implemented as appropriate)    Goal: Absence of Fall  Outcome: Ongoing (interventions implemented as appropriate)      Problem: Skin Injury Risk (Adult)  Goal: Identify Related Risk Factors and Signs and Symptoms  Outcome: Ongoing (interventions implemented as appropriate)    Goal: Skin Health and Integrity  Outcome: Ongoing (interventions implemented as appropriate)

## 2018-09-15 NOTE — PROGRESS NOTES
[facility] Medicine Services  PROGRESS NOTE    Patient Name: Dacia Nelson  : 1925  MRN: [id number]    Date of Admission: 2018  Length of Stay: 2  Primary Care Physician: Momo Breaux PA    Subjective   Subjective     CC:  weakness    HPI:  Feeling better. No dyspnea. No nausea today    Review of Systems  No headache    Otherwise ROS is negative except as mentioned in the HPI.    Objective   Objective     Vital Signs:   Temp:  [97.3 °F (36.3 °C)-97.6 °F (36.4 °C)] 97.6 °F (36.4 °C)  Heart Rate:  [64-80] 64  Resp:  [16-18] 16  BP: (124-134)/(60-78) 124/60        Physical Exam:  Constitutional:Alert, oriented x 3, nontoxic appearing  Psych:Normal/appropriate affect  HEENT:Ncat, oroph clear  Neck: neck supple, full range of motion  Neuro: Face symmetric, speech clear, equal , moves all extremities  Cardiac: Rrr;   Resp: Ctab, normal effort  GI: abd soft, nontender  Skin: trace BLE w/ bilateral chronic venous stasis  Musculoskeletal/extremities: no cyanosis extremities; no significant ankle edema            Results Reviewed:  I have personally reviewed current lab, radiology, and data and agree.      Results from last 7 days  Lab Units 09/15/18  0518  0618  1637   WBC 10*3/mm3 5.63 3.29* 6.56   HEMOGLOBIN g/dL 9.9* 10.4* 11.8   HEMATOCRIT % 29.9* 30.6* 35.0   PLATELETS 10*3/mm3 164 149* 158       Results from last 7 days  Lab Units 09/15/18  0520 18  0606 18  1637   SODIUM mmol/L 130* 129* 127*   POTASSIUM mmol/L 4.6 4.9 4.9   CHLORIDE mmol/L 101 97* 91*   CO2 mmol/L 21.0 20.0 19.0*   BUN mg/dL 36* 36* 38*   CREATININE mg/dL 1.58* 1.95* 2.12*   GLUCOSE mg/dL 119* 131* 98   CALCIUM mg/dL 7.7* 7.6* 8.4*   ALT (SGPT) U/L  --   --  37   AST (SGOT) U/L  --   --  31     Estimated Creatinine Clearance: 20.8 mL/min (A) (by C-G formula based on SCr of 1.58 mg/dL (H)).  BNP   Date Value Ref Range Status   2018 135.0 (H) 0.0 - 100.0 pg/mL  Final     Comment:     Results may be falsely decreased if patient taking Biotin.       Microbiology Results Abnormal     Procedure Component Value - Date/Time    Urine Culture - Urine, [[id number]]  (Abnormal)  (Susceptibility) Collected:  09/13/18 1813    Lab Status:  Final result Specimen:  Urine from Urine, Clean Catch Updated:  09/15/18 0928     Urine Culture >100,000 CFU/mL Escherichia coli (A)    Susceptibility      Escherichia coli     RICO     Amikacin <=16 ug/ml Susceptible     Ampicillin >16 ug/ml Resistant     Ampicillin + Sulbactam 16/8 ug/ml Intermediate     Aztreonam <=8 ug/ml Susceptible     Cefepime <=8 ug/ml Susceptible     Cefotaxime <=2 ug/ml Susceptible     Ceftriaxone <=8 ug/ml Susceptible     Cefuroxime sodium <=4 ug/ml Susceptible     Cephalothin 16 ug/ml Intermediate     Ertapenem <=1 ug/ml Susceptible     Gentamicin >8 ug/ml Resistant     Levofloxacin <=2 ug/ml Susceptible     Meropenem <=1 ug/ml Susceptible     Nitrofurantoin <=32 ug/ml Susceptible     Piperacillin + Tazobactam <=16 ug/ml Susceptible     Tetracycline >8 ug/ml Resistant     Tobramycin 8 ug/ml Intermediate     Trimethoprim + Sulfamethoxazole >2/38 ug/ml Resistant                          Imaging Results (last 24 hours)     ** No results found for the last 24 hours. **             I have reviewed the medications.      dexamethasone 4 mg Intravenous Q6H   ertapenem 500 mg Intravenous Q24H   famotidine 20 mg Oral Daily   heparin (porcine) 5,000 Units Subcutaneous Q12H   multivitamin with minerals 1 tablet Oral Daily   pantoprazole 40 mg Oral Q AM   potassium chloride 10 mEq Oral Daily   sertraline 50 mg Oral Daily   sodium chloride 500 mL Intravenous Once         Assessment/Plan   Assessment / Plan     Hospital Problem List     * (Principal)Acute renal failure (CMS/[facility])    UTI (urinary tract infection)    Hyponatremia    Dehydration    Essential hypertension    Irritable bowel syndrome with both constipation and diarrhea    FTT  (failure to thrive) in adult    Bereavement    Anemia             Brief Hospital Course to date:  Dacia Nelson is a 89 y.o. female whose   recently with anorexia, dehydration, weakness. Was found with uti, sharmila. Admitted and initiated w/ iv hydration and antibiotics. Due to pcn allergy (unknown reaction) initiated on invanz      Assessment & Plan:  -tsh normal  -duplex BLE prelmin negative (follow official)  -invanz day 2/3 (pcn allergy, ? Rxn) for ecoli uti  -normal saline 75cc/hr (baseline cr 1.2)  -holding home lasix (every other day at home)  -antiemetics  -b12 pending  -dietition has consulted  -pt ot following    -bmp in a.m. (monitor renal function)    -rehab referrals sent, hopefully by early next week    DVT Prophylaxis:  Sq heparin    CODE STATUS:   Code Status and Medical Interventions:   Ordered at: 18     Limited Support to NOT Include:    Intubation    Dialysis    Cardioversion/Defibrillation    Artificial Nutrition     Level Of Support Discussed With:    Patient     Code Status:    No CPR     Medical Interventions (Level of Support Prior to Arrest):    Limited       Disposition: I expect the patient to be discharged to rehab      Electronically signed by Igor Fairbanks MD, 09/15/18, 12:29 PM.

## 2018-09-16 PROBLEM — I87.2 CHRONIC VENOUS STASIS DERMATITIS OF BOTH LOWER EXTREMITIES: Status: ACTIVE | Noted: 2018-09-16

## 2018-09-16 LAB
ANION GAP SERPL CALCULATED.3IONS-SCNC: 10 MMOL/L (ref 3–11)
ANION GAP SERPL CALCULATED.3IONS-SCNC: 7 MMOL/L (ref 3–11)
BUN BLD-MCNC: 27 MG/DL (ref 9–23)
BUN BLD-MCNC: 31 MG/DL (ref 9–23)
BUN/CREAT SERPL: 19.3 (ref 7–25)
BUN/CREAT SERPL: 22 (ref 7–25)
CALCIUM SPEC-SCNC: 7.4 MG/DL (ref 8.7–10.4)
CALCIUM SPEC-SCNC: 7.8 MG/DL (ref 8.7–10.4)
CHLORIDE SERPL-SCNC: 105 MMOL/L (ref 99–109)
CHLORIDE SERPL-SCNC: 108 MMOL/L (ref 99–109)
CO2 SERPL-SCNC: 21 MMOL/L (ref 20–31)
CO2 SERPL-SCNC: 22 MMOL/L (ref 20–31)
CREAT BLD-MCNC: 1.4 MG/DL (ref 0.6–1.3)
CREAT BLD-MCNC: 1.41 MG/DL (ref 0.6–1.3)
GFR SERPL CREATININE-BSD FRML MDRD: 35 ML/MIN/1.73
GFR SERPL CREATININE-BSD FRML MDRD: 35 ML/MIN/1.73
GLUCOSE BLD-MCNC: 106 MG/DL (ref 70–100)
GLUCOSE BLD-MCNC: 116 MG/DL (ref 70–100)
POTASSIUM BLD-SCNC: 4.3 MMOL/L (ref 3.5–5.5)
POTASSIUM BLD-SCNC: 4.5 MMOL/L (ref 3.5–5.5)
SODIUM BLD-SCNC: 134 MMOL/L (ref 132–146)
SODIUM BLD-SCNC: 139 MMOL/L (ref 132–146)

## 2018-09-16 PROCEDURE — 25010000002 HEPARIN (PORCINE) PER 1000 UNITS: Performed by: INTERNAL MEDICINE

## 2018-09-16 PROCEDURE — 25010000002 ERTAPENEM PER 500 MG: Performed by: INTERNAL MEDICINE

## 2018-09-16 PROCEDURE — 80048 BASIC METABOLIC PNL TOTAL CA: CPT | Performed by: INTERNAL MEDICINE

## 2018-09-16 PROCEDURE — 99232 SBSQ HOSP IP/OBS MODERATE 35: CPT | Performed by: INTERNAL MEDICINE

## 2018-09-16 PROCEDURE — 25010000002 DEXAMETHASONE PER 1 MG: Performed by: NURSE PRACTITIONER

## 2018-09-16 RX ORDER — SODIUM CHLORIDE 9 MG/ML
75 INJECTION, SOLUTION INTRAVENOUS CONTINUOUS
Status: ACTIVE | OUTPATIENT
Start: 2018-09-16 | End: 2018-09-18

## 2018-09-16 RX ORDER — SODIUM CHLORIDE 9 MG/ML
75 INJECTION, SOLUTION INTRAVENOUS CONTINUOUS
Status: DISCONTINUED | OUTPATIENT
Start: 2018-09-16 | End: 2018-09-16

## 2018-09-16 RX ADMIN — SERTRALINE HYDROCHLORIDE 50 MG: 50 TABLET ORAL at 08:56

## 2018-09-16 RX ADMIN — HEPARIN SODIUM 5000 UNITS: 5000 INJECTION, SOLUTION INTRAVENOUS; SUBCUTANEOUS at 08:56

## 2018-09-16 RX ADMIN — HEPARIN SODIUM 5000 UNITS: 5000 INJECTION, SOLUTION INTRAVENOUS; SUBCUTANEOUS at 21:14

## 2018-09-16 RX ADMIN — DEXAMETHASONE SODIUM PHOSPHATE 4 MG: 4 INJECTION, SOLUTION INTRAMUSCULAR; INTRAVENOUS at 17:03

## 2018-09-16 RX ADMIN — PANTOPRAZOLE SODIUM 40 MG: 40 TABLET, DELAYED RELEASE ORAL at 06:02

## 2018-09-16 RX ADMIN — ERTAPENEM SODIUM 500 MG: 1 INJECTION, POWDER, LYOPHILIZED, FOR SOLUTION INTRAMUSCULAR; INTRAVENOUS at 08:56

## 2018-09-16 RX ADMIN — FAMOTIDINE 20 MG: 20 TABLET, FILM COATED ORAL at 08:56

## 2018-09-16 RX ADMIN — DEXAMETHASONE SODIUM PHOSPHATE 4 MG: 4 INJECTION, SOLUTION INTRAMUSCULAR; INTRAVENOUS at 06:02

## 2018-09-16 RX ADMIN — MULTIPLE VITAMINS W/ MINERALS TAB 1 TABLET: TAB ORAL at 08:56

## 2018-09-16 RX ADMIN — POTASSIUM CHLORIDE 10 MEQ: 750 CAPSULE, EXTENDED RELEASE ORAL at 08:56

## 2018-09-16 RX ADMIN — SODIUM CHLORIDE 75 ML/HR: 9 INJECTION, SOLUTION INTRAVENOUS at 06:02

## 2018-09-16 RX ADMIN — DEXAMETHASONE SODIUM PHOSPHATE 4 MG: 4 INJECTION, SOLUTION INTRAMUSCULAR; INTRAVENOUS at 13:08

## 2018-09-16 RX ADMIN — ACETAMINOPHEN 650 MG: 325 TABLET ORAL at 13:08

## 2018-09-16 RX ADMIN — DEXAMETHASONE SODIUM PHOSPHATE 4 MG: 4 INJECTION, SOLUTION INTRAMUSCULAR; INTRAVENOUS at 00:09

## 2018-09-16 NOTE — PLAN OF CARE
Problem: Patient Care Overview  Goal: Plan of Care Review  Outcome: Ongoing (interventions implemented as appropriate)   09/16/18 1251   Plan of Care Review   Progress improving   OTHER   Outcome Summary VSS. Pt alert, pleasant, cooperative. Ambulatory with assist of walker and one person. continues to c/o tenderness to BLEs; edema to BLE's appears to be decreasing. Urinating without difficulty. Appetite continues to be poor. Supplements and snacks continue. Some redness to coccyx but blanchable.    Coping/Psychosocial   Plan of Care Reviewed With patient;daughter       Problem: Fall Risk (Adult)  Goal: Absence of Fall  Outcome: Ongoing (interventions implemented as appropriate)   09/16/18 1251   Fall Risk (Adult)   Absence of Fall making progress toward outcome       Problem: Skin Injury Risk (Adult)  Goal: Skin Health and Integrity  Outcome: Ongoing (interventions implemented as appropriate)   09/16/18 1251   Skin Injury Risk (Adult)   Skin Health and Integrity making progress toward outcome

## 2018-09-16 NOTE — PROGRESS NOTES
[facility] Medicine Services  PROGRESS NOTE    Patient Name: Dacia Nelson  : 1925  MRN: [id number]    Date of Admission: 2018  Length of Stay: 3  Primary Care Physician: Momo Breaux PA    Subjective   Subjective     CC:  weakness    HPI:  No n/v, no diarrhea. No dyspnea. Still fatigued    Review of Systems  No headache    Otherwise ROS is negative except as mentioned in the HPI.    Objective   Objective     Vital Signs:   Temp:  [97.7 °F (36.5 °C)-98.1 °F (36.7 °C)] 97.7 °F (36.5 °C)  Heart Rate:  [67-73] 67  Resp:  [16-18] 16  BP: (119-146)/(59-70) 146/70        Physical Exam:  Constitutional:Alert, oriented x 3, nontoxic appearing  Psych:Normal/appropriate affect  HEENT:Ncat, oroph clear  Neck: neck supple, full range of motion  Neuro: Face symmetric, speech clear, equal , moves all extremities  Cardiac: Rrr;   Resp: Ctab, normal effort  GI: abd soft, nontender  Skin: trace BLE w/ bilateral chronic venous stasis  Musculoskeletal/extremities: no cyanosis extremities            Results Reviewed:  I have personally reviewed current lab, radiology, and data and agree.      Results from last 7 days  Lab Units 09/15/18  0518  0618  1637   WBC 10*3/mm3 5.63 3.29* 6.56   HEMOGLOBIN g/dL 9.9* 10.4* 11.8   HEMATOCRIT % 29.9* 30.6* 35.0   PLATELETS 10*3/mm3 164 149* 158       Results from last 7 days  Lab Units 18  0523 09/15/18  0520 18  0606 18  1637   SODIUM mmol/L 139 130* 129* 127*   POTASSIUM mmol/L 4.5 4.6 4.9 4.9   CHLORIDE mmol/L 108 101 97* 91*   CO2 mmol/L 21.0 21.0 20.0 19.0*   BUN mg/dL 31* 36* 36* 38*   CREATININE mg/dL 1.41* 1.58* 1.95* 2.12*   GLUCOSE mg/dL 116* 119* 131* 98   CALCIUM mg/dL 7.4* 7.7* 7.6* 8.4*   ALT (SGPT) U/L  --   --   --  37   AST (SGOT) U/L  --   --   --  31     Estimated Creatinine Clearance: 23.3 mL/min (A) (by C-G formula based on SCr of 1.41 mg/dL (H)).  BNP   Date Value Ref Range Status    09/13/2018 135.0 (H) 0.0 - 100.0 pg/mL Final     Comment:     Results may be falsely decreased if patient taking Biotin.       Microbiology Results Abnormal     Procedure Component Value - Date/Time    Urine Culture - Urine, [[id number]]  (Abnormal)  (Susceptibility) Collected:  09/13/18 1813    Lab Status:  Final result Specimen:  Urine from Urine, Clean Catch Updated:  09/15/18 0928     Urine Culture >100,000 CFU/mL Escherichia coli (A)    Susceptibility      Escherichia coli     RICO     Amikacin <=16 ug/ml Susceptible     Ampicillin >16 ug/ml Resistant     Ampicillin + Sulbactam 16/8 ug/ml Intermediate     Aztreonam <=8 ug/ml Susceptible     Cefepime <=8 ug/ml Susceptible     Cefotaxime <=2 ug/ml Susceptible     Ceftriaxone <=8 ug/ml Susceptible     Cefuroxime sodium <=4 ug/ml Susceptible     Cephalothin 16 ug/ml Intermediate     Ertapenem <=1 ug/ml Susceptible     Gentamicin >8 ug/ml Resistant     Levofloxacin <=2 ug/ml Susceptible     Meropenem <=1 ug/ml Susceptible     Nitrofurantoin <=32 ug/ml Susceptible     Piperacillin + Tazobactam <=16 ug/ml Susceptible     Tetracycline >8 ug/ml Resistant     Tobramycin 8 ug/ml Intermediate     Trimethoprim + Sulfamethoxazole >2/38 ug/ml Resistant                          Imaging Results (last 24 hours)     ** No results found for the last 24 hours. **             I have reviewed the medications.      dexamethasone 4 mg Intravenous Q6H   ertapenem 500 mg Intravenous Q24H   famotidine 20 mg Oral Daily   heparin (porcine) 5,000 Units Subcutaneous Q12H   multivitamin with minerals 1 tablet Oral Daily   pantoprazole 40 mg Oral Q AM   potassium chloride 10 mEq Oral Daily   sertraline 50 mg Oral Daily   sodium chloride 500 mL Intravenous Once         Assessment/Plan   Assessment / Plan     Hospital Problem List     * (Principal)Acute renal failure (CMS/HCC)    UTI (urinary tract infection)    Hyponatremia    Dehydration    Essential hypertension    Irritable bowel syndrome  with both constipation and diarrhea    FTT (failure to thrive) in adult    Bereavement    Anemia    Chronic venous stasis dermatitis of both lower extremities             Brief Hospital Course to date:  Dacia Nelson is a 89 y.o. female whose   recently with anorexia, dehydration, weakness. Was found with uti, sharmila. Admitted and initiated w/ iv hydration and antibiotics. Due to pcn allergy (unknown reaction) initiated on invanz      Assessment & Plan:  -tsh normal  -duplex BLE prelmin negative (follow official)  -invanz day 3/3 (pcn allergy)  -normal saline 75cc/hr x 10 more hours, encouraging po intake (baseline cr 1.2)  -holding home lasix (every other day at home)  -antiemetics  -b12 pending  -dietition has consulted  -pt ot following    -bmp in a.m. (creatinine improving)    -rehab referrals sent, hopefully by early this week    DVT Prophylaxis:  Sq heparin    CODE STATUS:   Code Status and Medical Interventions:   Ordered at: 18     Limited Support to NOT Include:    Intubation    Dialysis    Cardioversion/Defibrillation    Artificial Nutrition     Level Of Support Discussed With:    Patient     Code Status:    No CPR     Medical Interventions (Level of Support Prior to Arrest):    Limited       Disposition: I expect the patient to be discharged to rehab      Electronically signed by Igor Fairbanks MD, 18, 8:31 AM.

## 2018-09-16 NOTE — PLAN OF CARE
Problem: Patient Care Overview  Goal: Plan of Care Review  Outcome: Ongoing (interventions implemented as appropriate)   09/14/18 1620 09/15/18 2000 09/16/18 0333   Plan of Care Review   Progress improving --  --    OTHER   Outcome Summary --  --  VSS, pt slept most of shift, no prn given   Coping/Psychosocial   Plan of Care Reviewed With --  patient --      Goal: Individualization and Mutuality  Outcome: Ongoing (interventions implemented as appropriate)    Goal: Discharge Needs Assessment  Outcome: Ongoing (interventions implemented as appropriate)    Goal: Interprofessional Rounds/Family Conf  Outcome: Ongoing (interventions implemented as appropriate)      Problem: Fall Risk (Adult)  Goal: Identify Related Risk Factors and Signs and Symptoms  Outcome: Ongoing (interventions implemented as appropriate)    Goal: Absence of Fall  Outcome: Ongoing (interventions implemented as appropriate)      Problem: Skin Injury Risk (Adult)  Goal: Identify Related Risk Factors and Signs and Symptoms  Outcome: Ongoing (interventions implemented as appropriate)    Goal: Skin Health and Integrity  Outcome: Ongoing (interventions implemented as appropriate)

## 2018-09-17 LAB
ANION GAP SERPL CALCULATED.3IONS-SCNC: 5 MMOL/L (ref 3–11)
BH CV ECHO MEAS - BSA(HAYCOCK): 1.7 M^2
BH CV ECHO MEAS - BSA: 1.7 M^2
BH CV ECHO MEAS - BZI_BMI: 28.3 KILOGRAMS/M^2
BH CV ECHO MEAS - BZI_METRIC_HEIGHT: 154.9 CM
BH CV ECHO MEAS - BZI_METRIC_WEIGHT: 68 KG
BH CV LOWER VASCULAR LEFT COMMON FEMORAL AUGMENT: NORMAL
BH CV LOWER VASCULAR LEFT COMMON FEMORAL COMPETENT: NORMAL
BH CV LOWER VASCULAR LEFT COMMON FEMORAL COMPRESS: NORMAL
BH CV LOWER VASCULAR LEFT COMMON FEMORAL PHASIC: NORMAL
BH CV LOWER VASCULAR LEFT COMMON FEMORAL SPONT: NORMAL
BH CV LOWER VASCULAR LEFT DISTAL FEMORAL COMPRESS: NORMAL
BH CV LOWER VASCULAR LEFT GASTRONEMIUS COMPRESS: NORMAL
BH CV LOWER VASCULAR LEFT GREATER SAPH AK COMPRESS: NORMAL
BH CV LOWER VASCULAR LEFT GREATER SAPH BK COMPRESS: NORMAL
BH CV LOWER VASCULAR LEFT LESSER SAPH COMPRESS: NORMAL
BH CV LOWER VASCULAR LEFT MID FEMORAL AUGMENT: NORMAL
BH CV LOWER VASCULAR LEFT MID FEMORAL COMPRESS: NORMAL
BH CV LOWER VASCULAR LEFT MID FEMORAL PHASIC: NORMAL
BH CV LOWER VASCULAR LEFT MID FEMORAL SPONT: NORMAL
BH CV LOWER VASCULAR LEFT PERONEAL COMPRESS: NORMAL
BH CV LOWER VASCULAR LEFT POPLITEAL AUGMENT: NORMAL
BH CV LOWER VASCULAR LEFT POPLITEAL COMPRESS: NORMAL
BH CV LOWER VASCULAR LEFT POPLITEAL PHASIC: NORMAL
BH CV LOWER VASCULAR LEFT POPLITEAL SPONT: NORMAL
BH CV LOWER VASCULAR LEFT POSTERIOR TIBIAL COMPRESS: NORMAL
BH CV LOWER VASCULAR LEFT PROFUNDA FEMORAL AUGMENT: NORMAL
BH CV LOWER VASCULAR LEFT PROFUNDA FEMORAL COMPRESS: NORMAL
BH CV LOWER VASCULAR LEFT PROFUNDA FEMORAL PHASIC: NORMAL
BH CV LOWER VASCULAR LEFT PROFUNDA FEMORAL SPONT: NORMAL
BH CV LOWER VASCULAR LEFT PROXIMAL FEMORAL COMPRESS: NORMAL
BH CV LOWER VASCULAR LEFT SAPHENOFEMORAL JUNCTION AUGMENT: NORMAL
BH CV LOWER VASCULAR LEFT SAPHENOFEMORAL JUNCTION COMPRESS: NORMAL
BH CV LOWER VASCULAR LEFT SAPHENOFEMORAL JUNCTION PHASIC: NORMAL
BH CV LOWER VASCULAR LEFT SAPHENOFEMORAL JUNCTION SPONT: NORMAL
BH CV LOWER VASCULAR RIGHT COMMON FEMORAL AUGMENT: NORMAL
BH CV LOWER VASCULAR RIGHT COMMON FEMORAL COMPRESS: NORMAL
BH CV LOWER VASCULAR RIGHT COMMON FEMORAL PHASIC: NORMAL
BH CV LOWER VASCULAR RIGHT COMMON FEMORAL SPONT: NORMAL
BH CV LOWER VASCULAR RIGHT DISTAL FEMORAL COMPRESS: NORMAL
BH CV LOWER VASCULAR RIGHT GASTRONEMIUS COMPRESS: NORMAL
BH CV LOWER VASCULAR RIGHT GREATER SAPH AK COMPRESS: NORMAL
BH CV LOWER VASCULAR RIGHT GREATER SAPH BK COMPRESS: NORMAL
BH CV LOWER VASCULAR RIGHT LESSER SAPH COMPRESS: NORMAL
BH CV LOWER VASCULAR RIGHT MID FEMORAL AUGMENT: NORMAL
BH CV LOWER VASCULAR RIGHT MID FEMORAL COMPRESS: NORMAL
BH CV LOWER VASCULAR RIGHT MID FEMORAL PHASIC: NORMAL
BH CV LOWER VASCULAR RIGHT MID FEMORAL SPONT: NORMAL
BH CV LOWER VASCULAR RIGHT PERONEAL COMPRESS: NORMAL
BH CV LOWER VASCULAR RIGHT POPLITEAL AUGMENT: NORMAL
BH CV LOWER VASCULAR RIGHT POPLITEAL COMPRESS: NORMAL
BH CV LOWER VASCULAR RIGHT POPLITEAL PHASIC: NORMAL
BH CV LOWER VASCULAR RIGHT POPLITEAL SPONT: NORMAL
BH CV LOWER VASCULAR RIGHT POSTERIOR TIBIAL COMPRESS: NORMAL
BH CV LOWER VASCULAR RIGHT PROFUNDA FEMORAL COMPRESS: NORMAL
BH CV LOWER VASCULAR RIGHT PROXIMAL FEMORAL COMPRESS: NORMAL
BH CV LOWER VASCULAR RIGHT SAPHENOFEMORAL JUNCTION AUGMENT: NORMAL
BH CV LOWER VASCULAR RIGHT SAPHENOFEMORAL JUNCTION COMPRESS: NORMAL
BH CV LOWER VASCULAR RIGHT SAPHENOFEMORAL JUNCTION PHASIC: NORMAL
BH CV LOWER VASCULAR RIGHT SAPHENOFEMORAL JUNCTION SPONT: NORMAL
BUN BLD-MCNC: 26 MG/DL (ref 9–23)
BUN/CREAT SERPL: 20.8 (ref 7–25)
CALCIUM SPEC-SCNC: 7.6 MG/DL (ref 8.7–10.4)
CHLORIDE SERPL-SCNC: 108 MMOL/L (ref 99–109)
CO2 SERPL-SCNC: 23 MMOL/L (ref 20–31)
CREAT BLD-MCNC: 1.25 MG/DL (ref 0.6–1.3)
GFR SERPL CREATININE-BSD FRML MDRD: 40 ML/MIN/1.73
GLUCOSE BLD-MCNC: 108 MG/DL (ref 70–100)
POTASSIUM BLD-SCNC: 4.4 MMOL/L (ref 3.5–5.5)
SODIUM BLD-SCNC: 136 MMOL/L (ref 132–146)
VIT B12 BLD-MCNC: 183 PG/ML (ref 211–911)

## 2018-09-17 PROCEDURE — 97116 GAIT TRAINING THERAPY: CPT

## 2018-09-17 PROCEDURE — 99232 SBSQ HOSP IP/OBS MODERATE 35: CPT | Performed by: INTERNAL MEDICINE

## 2018-09-17 PROCEDURE — 97110 THERAPEUTIC EXERCISES: CPT

## 2018-09-17 PROCEDURE — 25010000002 DEXAMETHASONE PER 1 MG: Performed by: NURSE PRACTITIONER

## 2018-09-17 PROCEDURE — 80048 BASIC METABOLIC PNL TOTAL CA: CPT | Performed by: INTERNAL MEDICINE

## 2018-09-17 PROCEDURE — 25010000002 HEPARIN (PORCINE) PER 1000 UNITS: Performed by: INTERNAL MEDICINE

## 2018-09-17 PROCEDURE — 97535 SELF CARE MNGMENT TRAINING: CPT | Performed by: OCCUPATIONAL THERAPIST

## 2018-09-17 RX ADMIN — DEXAMETHASONE SODIUM PHOSPHATE 4 MG: 4 INJECTION, SOLUTION INTRAMUSCULAR; INTRAVENOUS at 00:07

## 2018-09-17 RX ADMIN — SODIUM CHLORIDE 75 ML/HR: 9 INJECTION, SOLUTION INTRAVENOUS at 09:08

## 2018-09-17 RX ADMIN — HEPARIN SODIUM 5000 UNITS: 5000 INJECTION, SOLUTION INTRAVENOUS; SUBCUTANEOUS at 20:59

## 2018-09-17 RX ADMIN — DEXAMETHASONE SODIUM PHOSPHATE 4 MG: 4 INJECTION, SOLUTION INTRAMUSCULAR; INTRAVENOUS at 12:12

## 2018-09-17 RX ADMIN — SERTRALINE HYDROCHLORIDE 50 MG: 50 TABLET ORAL at 09:09

## 2018-09-17 RX ADMIN — HEPARIN SODIUM 5000 UNITS: 5000 INJECTION, SOLUTION INTRAVENOUS; SUBCUTANEOUS at 09:11

## 2018-09-17 RX ADMIN — DEXAMETHASONE SODIUM PHOSPHATE 4 MG: 4 INJECTION, SOLUTION INTRAMUSCULAR; INTRAVENOUS at 05:39

## 2018-09-17 RX ADMIN — FAMOTIDINE 20 MG: 20 TABLET, FILM COATED ORAL at 09:09

## 2018-09-17 RX ADMIN — PANTOPRAZOLE SODIUM 40 MG: 40 TABLET, DELAYED RELEASE ORAL at 05:39

## 2018-09-17 RX ADMIN — MULTIPLE VITAMINS W/ MINERALS TAB: TAB ORAL at 09:09

## 2018-09-17 RX ADMIN — POTASSIUM CHLORIDE 10 MEQ: 750 CAPSULE, EXTENDED RELEASE ORAL at 09:10

## 2018-09-17 RX ADMIN — DEXAMETHASONE SODIUM PHOSPHATE 4 MG: 4 INJECTION, SOLUTION INTRAMUSCULAR; INTRAVENOUS at 18:04

## 2018-09-17 NOTE — THERAPY TREATMENT NOTE
Acute Care - Physical Therapy Treatment Note  [facility]     Patient Name: Dacia Nelson  : 1925  MRN: [id number]  Today's Date: 2018  Onset of Illness/Injury or Date of Surgery: 18  Date of Referral to PT: 18  Referring Physician: LEXI Rodarte    Admit Date: 2018    Visit Dx:    ICD-10-CM ICD-9-CM   1. Acute renal failure, unspecified acute renal failure type (CMS/HCC) N17.9 584.9   2. Dehydration with hyponatremia E87.1 276.1   3. Adult failure to thrive syndrome R62.7 783.7   4. Malnutrition compromising bodily function (CMS/HCC) E46 263.9   5. Dependent edema R60.9 782.3   6. Impaired functional mobility, balance, gait, and endurance Z74.09 V49.89   7. Impaired mobility and ADLs Z74.09 799.89     Patient Active Problem List   Diagnosis   • Essential hypertension   • Irritable bowel syndrome with both constipation and diarrhea   • Primary osteoarthritis involving multiple joints   • Acute renal failure (CMS/HCC)   • FTT (failure to thrive) in adult   • Hyponatremia   • Dehydration   • UTI (urinary tract infection)   • Bereavement   • Anemia   • Chronic venous stasis dermatitis of both lower extremities       Therapy Treatment          Rehabilitation Treatment Summary     Row Name 18 1100 18 1005          Treatment Time/Intention    Discipline physical therapist  -MB occupational therapist  -SD     Document Type therapy note (daily note)  -MB therapy note (daily note)  -SD     Subjective Information complains of;weakness;fatigue  -MB complains of;fatigue  -SD     Mode of Treatment physical therapy;individual therapy  -MB occupational therapy  -SD     Patient/Family Observations Pt. sitting UIC, IV intact L UE, on RA.  RN consent for PT.  Pt's dtr present at bedside.  -MB Pt. on commode in bathroom. Pt.'s dtr. present.  -SD     Care Plan Review care plan/treatment goals reviewed;risks/benefits reviewed;current/potential barriers reviewed;patient/other agree to care  plan  -MB care plan/treatment goals reviewed;risks/benefits reviewed;current/potential barriers reviewed;patient/other agree to care plan  -SD     Care Plan Review, Other Participant(s) daughter  -MB daughter  -SD     Therapy Frequency (PT Clinical Impression) daily  -MB  --     Total Minutes, Occupational Therapy Treatment  -- 25  -SD     Patient Effort good  -MB good  -SD     Comment  -- pt. with pain to the touch in B LE, especially ankles/feet  -SD     Existing Precautions/Restrictions fall  -MB  --     Treatment Considerations/Comments Pt. requires encouragement to participate.  -MB  --     Recorded by [MB] Nyasia Naqvi, PT 09/17/18 1214 [SD] Savanah Hargrove, OT 09/17/18 1042     Row Name 09/17/18 1100 09/17/18 1005          Vital Signs    Pre Systolic BP Rehab --   VSS.  RN cleared for PT.  -MB  --     Pre Patient Position Sitting  -MB Sitting  -SD     Intra Patient Position Standing  -MB Standing  -SD     Post Patient Position Supine  -MB Sitting  -SD     Recorded by [MB] Nyasia Naqvi, PT 09/17/18 1214 [SD] Savanah Hargrove, OT 09/17/18 1042     Row Name 09/17/18 1100 09/17/18 1005          Cognitive Assessment/Intervention- PT/OT    Affect/Mental Status (Cognitive) WFL  -MB WFL  -SD     Orientation Status (Cognition) oriented x 4  -MB oriented x 4  -SD     Follows Commands (Cognition) WFL;follows one step commands;over 90% accuracy;repetition of directions required;verbal cues/prompting required  -MB WFL;follows one step commands;over 90% accuracy  -SD     Cognitive Function (Cognitive) safety deficit  -MB safety deficit  -SD     Safety Deficit (Cognitive) mild deficit;awareness of need for assistance;insight into deficits/self awareness;safety precautions awareness  -MB mild deficit;insight into deficits/self awareness;awareness of need for assistance;safety precautions follow-through/compliance;safety precautions awareness  -SD     Personal Safety Interventions fall prevention  program maintained;gait belt;muscle strengthening facilitated;nonskid shoes/slippers when out of bed  -MB fall prevention program maintained;gait belt;nonskid shoes/slippers when out of bed  -SD     Recorded by [MB] Nyasia Naqvi, PT 09/17/18 1214 [SD] Savanah Hargrove, OT 09/17/18 1042     Row Name 09/17/18 1100 09/17/18 1005          Safety Issues, Functional Mobility    Safety Issues Affecting Function (Mobility)  -- awareness of need for assistance;insight into deficits/self awareness  -SD     Impairments Affecting Function (Mobility) balance;endurance/activity tolerance;strength  -MB balance;endurance/activity tolerance  -SD     Recorded by [MB] Nyasia Naqvi, PT 09/17/18 1214 [SD] Savanah Hargrove, OT 09/17/18 1042     Row Name 09/17/18 1100             Bed Mobility Assessment/Treatment    Scooting/Bridging [place] (Bed Mobility) maximum assist (25% patient effort);verbal cues;nonverbal cues (demo/gesture);2 person assist   scooting to HOB  -MB      Sit-Supine [place] (Bed Mobility) moderate assist (50% patient effort);verbal cues;nonverbal cues (demo/gesture)  -MB      Bed Mobility, Safety Issues decreased use of arms for pushing/pulling;decreased use of legs for bridging/pushing  -MB      Assistive Device (Bed Mobility) bed rails;draw sheet  -MB      Comment (Bed Mobility) Pt. required cues for sequencing and assistance to lower trunk and raise BLEs into bed.    -MB      Recorded by [MB] Nyasia Naqvi, PT 09/17/18 1214      Row Name 09/17/18 1005             Functional Mobility    Functional Mobility- Ind. Level contact guard assist  -SD      Functional Mobility- Device standard walker  -SD      Functional Mobility-Distance (Feet) 15  -SD      Functional Mobility- Safety Issues step length decreased;sequencing ability decreased  -SD      Recorded by [SD] Savanah Hargrove, OT 09/17/18 1042      Row Name 09/17/18 1100 09/17/18 1005          Transfer  Assessment/Treatment    Transfer Assessment/Treatment sit-stand transfer;stand-sit transfer;toilet transfer  -MB bed-chair transfer;toilet transfer;sit-stand transfer;stand-sit transfer  -SD     Comment (Transfers) Pt. required consistent VCs for safe hand placement and positioning of RW, close to body.  -MB  --     Recorded by [MB] Nyasia Naqvi, PT 09/17/18 1214 [SD] Savanah Hargrove, OT 09/17/18 1042     Row Name 09/17/18 1005             Bed-Chair Transfer    Bed-Chair [place] (Transfers) contact guard;minimum assist (75% patient effort)  -SD      Assistive Device (Bed-Chair Transfers) walker, standard  -SD      Recorded by [SD] Savanah Hargrove, OT 09/17/18 1042      Row Name 09/17/18 1100 09/17/18 1005          Sit-Stand Transfer    Sit-Stand [place] (Transfers) minimum assist (75% patient effort);verbal cues;nonverbal cues (demo/gesture)  -MB contact guard;minimum assist (75% patient effort)  -SD     Assistive Device (Sit-Stand Transfers) walker, front-wheeled  -MB walker, standard  -SD     Recorded by [MB] Nyasia Naqvi, PT 09/17/18 1214 [SD] Savanah Hargrove, OT 09/17/18 1042     Row Name 09/17/18 1100 09/17/18 1005          Stand-Sit Transfer    Stand-Sit [place] (Transfers) contact guard;verbal cues;nonverbal cues (demo/gesture)  -MB contact guard;minimum assist (75% patient effort)  -SD     Assistive Device (Stand-Sit Transfers) walker, front-wheeled  -MB walker, standard  -SD     Recorded by [MB] Nyasia Naqvi, PT 09/17/18 1214 [SD] Savanah Hargrove, OT 09/17/18 1042     Row Name 09/17/18 1100 09/17/18 1005          Toilet Transfer    Type (Toilet Transfer) sit-stand  -MB sit-stand  -SD     [place] Level (Toilet Transfer) moderate assist (50% patient effort);verbal cues;nonverbal cues (demo/gesture)  -MB contact guard;minimum assist (75% patient effort)  -SD     Assistive Device (Toilet Transfer) commode;grab bars/safety frame;walker,  front-wheeled  -MB commode;grab bars/safety frame  -SD     Recorded by [MB] Nyasia Naqvi, PT 09/17/18 1214 [SD] Savanah Hargrove, OT 09/17/18 1042     Row Name 09/17/18 1100             Gait/Stairs Assessment/Training    Gait/Stairs Assessment/Training gait/ambulation independence;gait/ambulation assistive device;distance ambulated;gait pattern;gait deviations  -MB      [place] Level (Gait) minimum assist (75% patient effort);verbal cues;nonverbal cues (demo/gesture)  -MB      Assistive Device (Gait) walker, front-wheeled  -MB      Distance in Feet (Gait) 30  -MB      Pattern (Gait) step-through  -MB      Deviations/Abnormal Patterns (Gait) waylon decreased;gait speed decreased;stride length decreased  -MB      Bilateral Gait Deviations forward flexed posture;heel strike decreased  -MB      Comment (Gait/Stairs) Pt. demonstrated step-through gait pattern at slow pace, w/ VCs for upright posture, safe positioning of RW closer to body, and increased B step length.  Min improvement noted despite cues.  Pt. c/o fatigue and declined further gait.  -MB      Recorded by [MB] Nyasia Naqvi, PT 09/17/18 1214      Row Name 09/17/18 1005             ADL Assessment/Intervention    16735 - OT Self Care/Mgmt Minutes 10  -SD      BADL Assessment/Intervention toileting;grooming  -SD      Recorded by [SD] Savanah Hargrove, OT 09/17/18 1042      Row Name 09/17/18 1005             Grooming Assessment/Training    [place] Level (Grooming) minimum assist (75% patient effort);set up;wash face, hands   pt. brushed hair around face but A for back of head  -SD      Grooming Position supported sitting  -SD      Recorded by [SD] Savanah Hargrove, OT 09/17/18 1042      Row Name 09/17/18 1100 09/17/18 1005          Toileting Assessment/Training    [place] Level (Toileting) perform perineal hygiene;maximum assist (25% patient effort)  -MB minimum assist (75% patient effort)  -SD     Assistive Devices  "(Toileting) commode;grab bar/safety frame  -MB commode;grab bar/safety frame  -SD     Toileting Position  -- supported standing  -SD     Comment (Toileting) Pt. stated she was \"clean,\" but asked therapist to \"check.\"  Pt. found w/ large amount of stool still on bottom and up front (after returning to supine).  Pt. required max A for adequate post-toilet hygiene.  Pt's dtr present, assisting therapist.  -MB  --     Recorded by [MB] Nyasia Naqvi, PT 09/17/18 1214 [SD] Savanah Hargrove, OT 09/17/18 1042     Row Name 09/17/18 1005             Therapeutic Exercise    Therapeutic Exercise seated, lower extremities  -SD      Recorded by [SD] Savanah Hargrove, OT 09/17/18 1042      Row Name 09/17/18 1100 09/17/18 1005          Lower Extremity Seated Therapeutic Exercise    Performed, Seated Lower Extremity (Therapeutic Exercise) hip flexion/extension;LAQ (long arc quad), knee extension;ankle dorsiflexion/plantarflexion  -MB knee flexion/extension;ankle dorsiflexion/plantarflexion  -SD     Exercise Type, Seated Lower Extremity (Therapeutic Exercise) AROM (active range of motion)  -MB AROM (active range of motion);AAROM (active assistive range of motion)  -SD     Sets/Reps Detail, Seated Lower Extremity (Therapeutic Exercise)  -- 4/10  -SD     Recorded by [MB] Nyasia Naqvi, PT 09/17/18 1214 [SD] Savanah Hargrove, OT 09/17/18 1042     Row Name 09/17/18 1100 09/17/18 1005          Static Standing Balance    Level of [place] (Supported Standing, Static Balance) contact guard assist  -MB contact guard assist  -SD     Time Able to Maintain Position (Supported Standing, Static Balance) 3 to 4 minutes  -MB 3 to 4 minutes  -SD     Assistive Device Utilized (Supported Standing, Static Balance) rolling walker  -MB standard walker  -SD     Recorded by [MB] Nyasia Naqvi, PT 09/17/18 1214 [SD] Savanah Hargrove, OT 09/17/18 1042     Row Name 09/17/18 1100 09/17/18 1005          Dynamic " Standing Balance    Level of [place], Reaches Outside Midline (Standing, Dynamic Balance) contact guard assist  -MB contact guard assist;minimal assist, 75% patient effort  -SD     Time Able to Maintain Position, Reaches Outside Midline (Standing, Dynamic Balance) 3 to 4 minutes  -MB 3 to 4 minutes  -SD     Comment, Reaches Outside Midline (Standing, Dynamic Balance) post-toilet hygiene; upright posture  -MB  --     Recorded by [MB] Nyasia Naqvi, PT 09/17/18 1214 [SD] Savanah Hargrove, OT 09/17/18 1042     Row Name 09/17/18 1100 09/17/18 1005          Positioning and Restraints    Pre-Treatment Position sitting in chair/recliner  -MB --   on toilet in bathroom  -SD     Post Treatment Position bed  -MB chair  -SD     In Bed notified nsg;supine;call light within reach;encouraged to call for assist;with family/caregiver;heels elevated   Dtr stated she would notify nsg, if she leaves room.  -MB  --     In Chair  -- reclined;exit alarm on;call light within reach;encouraged to call for assist;with family/caregiver;legs elevated  -SD     Recorded by [MB] Nyasia Naqvi, PT 09/17/18 1214 [SD] Savanah Hargrove, OT 09/17/18 1042     Row Name 09/17/18 1100 09/17/18 1005          Pain Scale: FACES Pre/Post-Treatment    Pain: FACES Scale, Pretreatment 2-->hurts little bit  -MB 2-->hurts little bit  -SD     Pain: FACES Scale, Post-Treatment 4-->hurts little more  -MB 2-->hurts little bit  -SD     Pre/Post Treatment Pain Comment B LEs/ankles  -MB pt. only expressed pain when LE's near ankles & feet touched  -SD     Recorded by [MB] Nyasia Naqvi, PT 09/17/18 1214 [SD] Savanah Hargrove, OT 09/17/18 1042     Row Name                Wound 09/09/18 0911 coccyx fissure    Wound - Properties Group Date first assessed: 09/09/18 [MC] Time first assessed: 0911 [MC] Location: coccyx [] Type: fissure [MC] Stage, Pressure Injury: Stage 1 [MC] Recorded by:  [PINA] Chary Ferguson RN 09/16/18 0916     Row Name 09/17/18 1005             Coping    Observed Emotional State calm;cooperative  -SD      Recorded by [SD] Savanah Hargrove, OT 09/17/18 1042      Row Name 09/17/18 1100 09/17/18 1005          Plan of Care Review    Plan of Care Reviewed With patient;daughter  -MB patient;daughter  -SD     Recorded by [MB] Nyasia Naqvi, PT 09/17/18 1214 [SD] Savanah Hargrove, OT 09/17/18 1042     Row Name 09/17/18 1005             Outcome Summary/Treatment Plan (OT)    Daily Summary of Progress (OT) progress toward functional goals is good  -SD      Barriers to Overall Progress (OT) pt.'s grief & sadness over death of her  of 75 years  -SD      Plan for Continued Treatment (OT) cont OT POC  -SD      Anticipated Discharge Disposition (OT) skilled nursing facility  -SD      Recorded by [SD] Savanah Hargrove, OT 09/17/18 1042      Row Name 09/17/18 1100             Outcome Summary/Treatment Plan (PT)    Daily Summary of Progress (PT) progress toward functional goals as expected  -MB      Barriers to Overall Progress (PT) grief/motivation  -MB      Plan for Continued Treatment (PT) Cont IPPT per POC.  -MB      Anticipated Discharge Disposition (PT) skilled nursing facility  -MB      Recorded by [MB] Nyasia Naqvi, PT 09/17/18 1214        User Key  (r) = Recorded By, (t) = Taken By, (c) = Cosigned By    Initials Name Effective Dates Discipline    SD Savanah Hargrove, OT 06/08/18 -  OT    Nyasia Herrera, PT 03/14/16 -  PT    Chary Gayle, RN 04/17/18 -  Nurse          Wound 09/09/18 0911 coccyx fissure (Active)   Dressing Appearance dry;intact 9/17/2018  8:00 AM             Physical Therapy Education     Title: PT OT SLP Therapies (Done)     Topic: Physical Therapy (Done)     Point: Mobility training (Done)    Learning Progress Summary     Learner Status Readiness Method Response Comment Documented by    Patient Done Acceptance E,TB VU  SC 09/17/18 0124     Active  Acceptance E NR  KR 09/14/18 0924          Point: Home exercise program (Done)    Learning Progress Summary     Learner Status Readiness Method Response Comment Documented by    Patient Done Acceptance E,TB RODO  SC 09/17/18 0124          Point: Body mechanics (Done)    Learning Progress Summary     Learner Status Readiness Method Response Comment Documented by    Patient Done Acceptance E,TB VU  SC 09/17/18 0124     Active Acceptance E NR  KR 09/14/18 0924          Point: Precautions (Done)    Learning Progress Summary     Learner Status Readiness Method Response Comment Documented by    Patient Done Acceptance E,TB VU  SC 09/17/18 0124     Active Acceptance E NR  KR 09/14/18 0924                      User Key     Initials Effective Dates Name Provider Type Discipline    SC 06/16/16 -  Denise Luong, RN Registered Nurse Nurse     04/03/18 -  Marylin Liu, PT Physical Therapist PT                    PT Recommendation and Plan  Anticipated Discharge Disposition (PT): skilled nursing facility  Therapy Frequency (PT Clinical Impression): daily  Outcome Summary/Treatment Plan (PT)  Daily Summary of Progress (PT): progress toward functional goals as expected  Barriers to Overall Progress (PT): grief/motivation  Plan for Continued Treatment (PT): Cont IPPT per POC.  Anticipated Discharge Disposition (PT): skilled nursing facility  Plan of Care Reviewed With: patient, daughter  Outcome Summary: Pt. pleasant; however, limited by grief/motivation.  Pt's dtr present during PT, encouraging patient's mobility.  Pt. required increased assistance w/ lower surface transfers (toilet), but progressed forward ambulation to 30 ft w/ min A and cues for safety.  Gait distance limited by fatigue/weakness/grief.  Recommend cont IPPT per POC.          Outcome Measures     Row Name 09/17/18 1100 09/17/18 1005 09/14/18 1303       How much help from another person do you currently need...    Turning from your back to your side while in  flat bed without using bedrails? 3  -MB  --  --    Moving from lying on back to sitting on the side of a flat bed without bedrails? 3  -MB  --  --    Moving to and from a bed to a chair (including a wheelchair)? 3  -MB  --  --    Standing up from a chair using your arms (e.g., wheelchair, bedside chair)? 2  -MB  --  --    Climbing 3-5 steps with a railing? 2  -MB  --  --    To walk in hospital room? 3  -MB  --  --    AM-PAC 6 Clicks Score 16  -MB  --  --       How much help from another is currently needed...    Putting on and taking off regular lower body clothing?  -- 2  -SD 1  -TB    Bathing (including washing, rinsing, and drying)  -- 2  -SD 2  -TB    Toileting (which includes using toilet bed pan or urinal)  -- 2  -SD 2  -TB    Putting on and taking off regular upper body clothing  -- 3  -SD 3  -TB    Taking care of personal grooming (such as brushing teeth)  -- 3  -SD 3  -TB    Eating meals  -- 3  -SD 3  -TB    Score  -- 15  -SD 14  -TB       Functional Assessment    Outcome Measure Options AM-PAC 6 Clicks Basic Mobility (PT)  -MB AM-PAC 6 Clicks Daily Activity (OT)  -SD AM-PAC 6 Clicks Daily Activity (OT)  -TB      User Key  (r) = Recorded By, (t) = Taken By, (c) = Cosigned By    Initials Name Provider Type    Shelbie Tavarez, OT Occupational Therapist    Savanah Ignacio, OT Occupational Therapist    Nyasia Herrera, PT Physical Therapist           Time Calculation:         PT Charges     Row Name 09/17/18 1100             Time Calculation    Start Time 1100  -MB      PT Received On 09/17/18  -MB      PT Goal Re-Cert Due Date 09/24/18  -MB         Time Calculation- PT    Total Timed Code Minutes- PT 38 minute(s)  -MB         Timed Charges    88212 - PT Therapeutic Exercise Minutes 20  -MB      70482 - Gait Training Minutes  18  -MB        User Key  (r) = Recorded By, (t) = Taken By, (c) = Cosigned By    Initials Name Provider Type    Nyasia Herrera, PT Physical Therapist         Therapy Suggested Charges     Code   Minutes Charges    41857 (CPT®) Hc Pt Neuromusc Re Education Ea 15 Min      71291 (CPT®) Hc Pt Ther Proc Ea 15 Min 20 2    78075 (CPT®) Hc Gait Training Ea 15 Min 18 1    21601 (CPT®) Hc Pt Therapeutic Act Ea 15 Min      69302 (CPT®) Hc Pt Manual Therapy Ea 15 Min      67101 (CPT®) Hc Pt Iontophoresis Ea 15 Min      58714 (CPT®) Hc Pt Elec Stim Ea-Per 15 Min      60517 (CPT®) Hc Pt Ultrasound Ea 15 Min      59653 (CPT®) Hc Pt Self Care/Mgmt/Train Ea 15 Min      92440 (CPT®) Hc Pt Prosthetic (S) Train Initial Encounter, Each 15 Min      56026 (CPT®) Hc Pt Orthotic(S)/Prosthetic(S) Encounter, Each 15 Min      51214 (CPT®) Hc Orthotic(S) Mgmt/Train Initial Encounter, Each 15min      Total  38 3        Therapy Charges for Today     Code Description Service Date Service Provider Modifiers Qty    54760048575 HC PT THER PROC EA 15 MIN 9/17/2018 Nyasia Naqvi, PT GP 2    91944922459 HC GAIT TRAINING EA 15 MIN 9/17/2018 Nyasia Naqvi, PT GP 1          PT G-Codes  Outcome Measure Options: AM-PAC 6 Clicks Basic Mobility (PT)  AM-PAC 6 Clicks Score: 16  Score: 15    Nyasia Naqvi, PT  9/17/2018

## 2018-09-17 NOTE — PROGRESS NOTES
Continued Stay Note  [facility]     Patient Name: Dacia Nelson  MRN: [id number]  Today's Date: 9/17/2018    Admit Date: 9/13/2018    Consent obtained for the participation in the [facility] Transitions Program.  Isabel Keane RN        Discharge Plan     Row Name 09/17/18 1156       Plan    Plan Skilled placement    Plan Comments Currently, [facility] may have a bed at [facility] today, none at [facility]. Cherelle Evans will get back with me. I did speak with Mandy to let he know this.     Final Discharge Disposition Code 03 - skilled nursing facility (SNF)              Discharge Codes    No documentation.       Expected Discharge Date and Time     Expected Discharge Date Expected Discharge Time    Sep 18, 2018             Isabel Keane RN

## 2018-09-17 NOTE — PROGRESS NOTES
"Clinical Nutrition   Reason For Visit: Follow-up protocol    Patient Name: Dacia Nelson  YOB: 1925  MRN: [id number]  Date of Encounter: 09/17/18 12:31 PM  Admission date: 9/13/2018      Nutrition Assessment     Hospital Problem List  Principal Problem:    Acute renal failure (CMS/HCC)  Active Problems:    Essential hypertension    Irritable bowel syndrome with both constipation and diarrhea    FTT (failure to thrive) in adult    Hyponatremia    Dehydration    UTI (urinary tract infection)    Bereavement    Anemia    Chronic venous stasis dermatitis of both lower extremities      PMH: She  has a past medical history of Arthritis; Cellulitis; and Gastritis.   PSxH: She  has a past surgical history that includes Hysterectomy and Cholecystectomy.        Reported/Observed/Food/Nutrition Related History     RD spoke with pt's daughter. Daughter reports that pt continues to have poor appetite, picking at food, drinking some of [place] Instant Breakfast supplements. Daughter states no food preferences at this time.       Anthropometrics   Height: 62 in  Weight: 154 lb per bed scale (9/13)  BMI: 28.3  BMI classification: Overweight: 25.0-29.9kg/m2    UBW: 162 lb per RD note (9/14)    Weight change: Per Rd note (9/14)- \"Pt/family unable to give a weight loss amount.  State pt does not weight herself and she hasn't been in the hospital to be weighed in a long time.  Per EMR, pt has lost ~8 lbs in the last 10 months (5%).\"     Labs reviewed   Labs reviewed: Yes    Results from last 7 days  Lab Units 09/17/18  0531 09/16/18  0929 09/16/18  0523 09/15/18  0520  09/13/18  1637   SODIUM mmol/L 136 134 139 130*  < > 127*   POTASSIUM mmol/L 4.4 4.3 4.5 4.6  < > 4.9   CHLORIDE mmol/L 108 105 108 101  < > 91*   CO2 mmol/L 23.0 22.0 21.0 21.0  < > 19.0*   BUN mg/dL 26* 27* 31* 36*  < > 38*   CREATININE mg/dL 1.25 1.40* 1.41* 1.58*  < > 2.12*   GLUCOSE mg/dL 108* 106* 116* 119*  < > 98   CALCIUM mg/dL 7.6* 7.8* 7.4* " 7.7*  < > 8.4*   MAGNESIUM mg/dL  --   --   --  1.9  --  2.0   < > = values in this interval not displayed.    Medications reviewed   Medications reviewed: Yes    Current Nutrition Prescription   PO: Diet Regular; Cardiac; Small Feedings  Oral Nutrition Supplement: chocolate [place] Instant Breakfast 2xday  2PM snack: cheese and crackers    Evaluation of Received Nutrient/Fluid Intake:  25%/7 meals      Nutrition Diagnosis   9/14, updated 9/16  Problem Inadequate oral intake   Etiology Dehydration, environmental factors   Signs/Symptoms Reported decreased desire to eat, decreased appetite, mourning of 's passing in April; 25%/7 meals   Status: ongoing    Intervention   Intervention: Follow treatment progress, Care plan reviewed, Interview for preferences, Encourage intake      Goal:   General: Nutrition support treatment  PO: Increase intake      Monitoring/Evaluation:       Monitoring/Evaluation: Per protocol, PO intake, Supplement intake, Pertinent labs, Weight, Symptoms  Will Continue to follow per protocol  Vania Bangura, MS RD/LD [facility]  Time Spent: 30 minutes

## 2018-09-17 NOTE — THERAPY TREATMENT NOTE
Acute Care - Occupational Therapy Treatment Note  [facility]     Patient Name: Dacia Nelson  : 1925  MRN: [id number]  Today's Date: 2018  Onset of Illness/Injury or Date of Surgery: 18  Date of Referral to OT: 18  Referring Physician: LEXI Rodarte    Admit Date: 2018       ICD-10-CM ICD-9-CM   1. Acute renal failure, unspecified acute renal failure type (CMS/[facility]) N17.9 584.9   2. Dehydration with hyponatremia E87.1 276.1   3. Adult failure to thrive syndrome R62.7 783.7   4. Malnutrition compromising bodily function (CMS/[facility]) E46 263.9   5. Dependent edema R60.9 782.3   6. Impaired functional mobility, balance, gait, and endurance Z74.09 V49.89   7. Impaired mobility and ADLs Z74.09 799.89     Patient Active Problem List   Diagnosis   • Essential hypertension   • Irritable bowel syndrome with both constipation and diarrhea   • Primary osteoarthritis involving multiple joints   • Acute renal failure (CMS/HCC)   • FTT (failure to thrive) in adult   • Hyponatremia   • Dehydration   • UTI (urinary tract infection)   • Bereavement   • Anemia   • Chronic venous stasis dermatitis of both lower extremities     Past Medical History:   Diagnosis Date   • Arthritis    • Cellulitis    • Gastritis      Past Surgical History:   Procedure Laterality Date   • CHOLECYSTECTOMY     • HYSTERECTOMY         Therapy Treatment          Rehabilitation Treatment Summary     Row Name 18 1005             Treatment Time/Intention    Discipline occupational therapist  -SD      Document Type therapy note (daily note)  -SD      Subjective Information complains of;fatigue  -SD      Mode of Treatment occupational therapy  -SD      Patient/Family Observations Pt. on commode in bathroom. Pt.'s dtr. present.  -SD      Care Plan Review care plan/treatment goals reviewed;risks/benefits reviewed;current/potential barriers reviewed;patient/other agree to care plan  -SD      Care Plan Review, Other Participant(s)  daughter  -SD      Total Minutes, Occupational Therapy Treatment 25  -SD      Patient Effort good  -SD      Comment pt. with pain to the touch in B LE, especially ankles/feet  -SD      Recorded by [SD] Savanah Hargrove OT 09/17/18 1042      Row Name 09/17/18 1005             Vital Signs    Pre Patient Position Sitting  -SD      Intra Patient Position Standing  -SD      Post Patient Position Sitting  -SD      Recorded by [SD] Savanah Hargrove OT 09/17/18 1042      Row Name 09/17/18 1005             Cognitive Assessment/Intervention- PT/OT    Affect/Mental Status (Cognitive) WFL  -SD      Orientation Status (Cognition) oriented x 4  -SD      Follows Commands (Cognition) WFL;follows one step commands;over 90% accuracy  -SD      Cognitive Function (Cognitive) safety deficit  -SD      Safety Deficit (Cognitive) mild deficit;insight into deficits/self awareness;awareness of need for assistance;safety precautions follow-through/compliance;safety precautions awareness  -SD      Personal Safety Interventions fall prevention program maintained;gait belt;nonskid shoes/slippers when out of bed  -SD      Recorded by [SD] Savanah Hargrove OT 09/17/18 1042      Row Name 09/17/18 1005             Safety Issues, Functional Mobility    Safety Issues Affecting Function (Mobility) awareness of need for assistance;insight into deficits/self awareness  -SD      Impairments Affecting Function (Mobility) balance;endurance/activity tolerance  -SD      Recorded by [SD] Savanah Hargrove OT 09/17/18 1042      Row Name 09/17/18 1005             Functional Mobility    Functional Mobility- Ind. Level contact guard assist  -SD      Functional Mobility- Device standard walker  -SD      Functional Mobility-Distance (Feet) 15  -SD      Functional Mobility- Safety Issues step length decreased;sequencing ability decreased  -SD      Recorded by [SD] Savanah Hargrove OT 09/17/18 1042      Row Name 09/17/18 1005              Transfer Assessment/Treatment    Transfer Assessment/Treatment bed-chair transfer;toilet transfer;sit-stand transfer;stand-sit transfer  -SD      Recorded by [SD] Savanah Hargrove, OT 09/17/18 1042      Row Name 09/17/18 1005             Bed-Chair Transfer    Bed-Chair [place] (Transfers) contact guard;minimum assist (75% patient effort)  -SD      Assistive Device (Bed-Chair Transfers) walker, standard  -SD      Recorded by [SD] Savanah Hargrove OT 09/17/18 1042      Row Name 09/17/18 1005             Sit-Stand Transfer    Sit-Stand [place] (Transfers) contact guard;minimum assist (75% patient effort)  -SD      Assistive Device (Sit-Stand Transfers) walker, standard  -SD      Recorded by [SD] Savanah Hargrove OT 09/17/18 1042      Row Name 09/17/18 1005             Stand-Sit Transfer    Stand-Sit [place] (Transfers) contact guard;minimum assist (75% patient effort)  -SD      Assistive Device (Stand-Sit Transfers) walker, standard  -SD      Recorded by [SD] Savanah Hargrove OT 09/17/18 1042      Row Name 09/17/18 1005             Toilet Transfer    Type (Toilet Transfer) sit-stand  -SD      [place] Level (Toilet Transfer) contact guard;minimum assist (75% patient effort)  -SD      Assistive Device (Toilet Transfer) commode;grab bars/safety frame  -SD      Recorded by [SD] Savanah Hargrove OT 09/17/18 1042      Row Name 09/17/18 1005             ADL Assessment/Intervention    22726 - OT Self Care/Mgmt Minutes 10  -SD      BADL Assessment/Intervention toileting;grooming  -SD      Recorded by [SD] Savanah Hargrove OT 09/17/18 1042      Row Name 09/17/18 1005             Grooming Assessment/Training    [place] Level (Grooming) minimum assist (75% patient effort);set up;wash face, hands   pt. brushed hair around face but A for back of head  -SD      Grooming Position supported sitting  -SD      Recorded by [SD] Savanah Hargrove OT 09/17/18 1042       Row Name 09/17/18 1005             Toileting Assessment/Training    [place] Level (Toileting) minimum assist (75% patient effort)  -SD      Assistive Devices (Toileting) commode;grab bar/safety frame  -SD      Toileting Position supported standing  -SD      Recorded by [SD] Savanah Hargrove OT 09/17/18 1042      Row Name 09/17/18 1005             Therapeutic Exercise    Therapeutic Exercise seated, lower extremities  -SD      Recorded by [SD] Savanah Hargrove OT 09/17/18 1042      Row Name 09/17/18 1005             Lower Extremity Seated Therapeutic Exercise    Performed, Seated Lower Extremity (Therapeutic Exercise) knee flexion/extension;ankle dorsiflexion/plantarflexion  -SD      Exercise Type, Seated Lower Extremity (Therapeutic Exercise) AROM (active range of motion);AAROM (active assistive range of motion)  -SD      Sets/Reps Detail, Seated Lower Extremity (Therapeutic Exercise) 4/10  -SD      Recorded by [SD] Savanah Hargrove OT 09/17/18 1042      Row Name 09/17/18 1005             Static Standing Balance    Level of [place] (Supported Standing, Static Balance) contact guard assist  -SD      Time Able to Maintain Position (Supported Standing, Static Balance) 3 to 4 minutes  -SD      Assistive Device Utilized (Supported Standing, Static Balance) standard walker  -SD      Recorded by [SD] Savanah Hargrove OT 09/17/18 1042      Row Name 09/17/18 1005             Dynamic Standing Balance    Level of [place], Reaches Outside Midline (Standing, Dynamic Balance) contact guard assist;minimal assist, 75% patient effort  -SD      Time Able to Maintain Position, Reaches Outside Midline (Standing, Dynamic Balance) 3 to 4 minutes  -SD      Recorded by [SD] Savanah Hargrove OT 09/17/18 1042      [facility] Name 09/17/18 1005             Positioning and Restraints    Pre-Treatment Position --   on toilet in bathroom  -SD      Post Treatment Position chair  -SD      In Chair  reclined;exit alarm on;call light within reach;encouraged to call for assist;with family/caregiver;legs elevated  -SD      Recorded by [SD] Savanah Hargrove, OT 09/17/18 1042      Row Name 09/17/18 1005             Pain Scale: FACES Pre/Post-Treatment    Pain: FACES Scale, Pretreatment 2-->hurts little bit  -SD      Pain: FACES Scale, Post-Treatment 2-->hurts little bit  -SD      Pre/Post Treatment Pain Comment pt. only expressed pain when LE's near ankles & feet touched  -SD      Recorded by [SD] Savanah Hargrove, OT 09/17/18 1042      Row Name                Wound 09/09/18 0911 coccyx fissure    Wound - Properties Group Date first assessed: 09/09/18 [] Time first assessed: 0911 [] Location: coccyx [] Type: fissure [] Stage, Pressure Injury: Stage 1 [] Recorded by:  [] Chary Ferguson RN 09/16/18 0916    Row Name 09/17/18 1005             Coping    Observed Emotional State calm;cooperative  -SD      Recorded by [SD] Savanah Hargrove, OT 09/17/18 1042      Row Name 09/17/18 1005             Plan of Care Review    Plan of Care Reviewed With patient;daughter  -SD      Recorded by [SD] Savanah Hargrove, OT 09/17/18 1042      Row Name 09/17/18 1005             Outcome Summary/Treatment Plan (OT)    Daily Summary of Progress (OT) progress toward functional goals is good  -SD      Barriers to Overall Progress (OT) pt.'s grief & sadness over death of her  of 75 years  -SD      Plan for Continued Treatment (OT) cont OT POC  -SD      Anticipated Discharge Disposition (OT) skilled nursing facility  -SD      Recorded by [SD] Savanah Hargrove, OT 09/17/18 1042        User Key  (r) = Recorded By, (t) = Taken By, (c) = Cosigned By    Initials Name Effective Dates Discipline    SD Savanah Hargrove, OT 06/08/18 -  OT     Chary Ferguson RN 04/17/18 -  Nurse        Wound 09/09/18 0911 coccyx fissure (Active)   Dressing Appearance dry;intact 9/16/2018  8:00 PM   Drainage  Amount none 9/16/2018 12:00 PM   Dressing Care, Wound foam;dressing applied 9/16/2018 12:00 PM   Periwound Care, Wound dry periwound area maintained 9/16/2018 12:00 PM         Occupational Therapy Education     Title: PT OT SLP Therapies (Done)     Topic: Occupational Therapy (Done)     Point: ADL training (Done)     Description: Instruct learner(s) on proper safety adaptation and remediation techniques during self care or transfers.   Instruct in proper use of assistive devices.   Learning Progress Summary     Learner Status Readiness Method Response Comment Documented by    Patient Done Acceptance E, VU  SC 09/17/18 0124     Done Acceptance E VU,NR Education initiated for benefits of OOB activity/therapy, role of OT, transfer training/safety and recommendation for SNF at d/[facility] 09/14/18 1411    Other Done Acceptance E VU,NR Education initiated for benefits of OOB activity/therapy, role of OT, transfer training/safety and recommendation for SNF at d/[facility] 09/14/18 1411                      User Key     Initials Effective Dates Name Provider Type Discipline     06/08/18 -  Shelbie Jonas OT Occupational Therapist OT    SC 06/16/16 -  Denise Luong RN Registered Nurse Nurse                OT Recommendation and Plan  Outcome Summary/Treatment Plan (OT)  Daily Summary of Progress (OT): progress toward functional goals is good  Barriers to Overall Progress (OT): pt.'s grief & sadness over death of her  of 75 years  Plan for Continued Treatment (OT): cont OT POC  Anticipated Discharge Disposition (OT): skilled nursing facility  Daily Summary of Progress (OT): progress toward functional goals is good  Plan of Care Review  Plan of Care Reviewed With: patient, daughter  Plan of Care Reviewed With: patient, daughter  Outcome Summary: Pt. participated in functional mobility & ADL skills. Pt. ambulated from bathroom to b/s chair with CGA-min A using standard wx. Pt. completed grooming skills from sitting  with set-up - min A. Cont OT POC.        Outcome Measures     Row Name 09/17/18 1005 09/14/18 1303          How much help from another is currently needed...    Putting on and taking off regular lower body clothing? 2  -SD 1  -TB     Bathing (including washing, rinsing, and drying) 2  -SD 2  -TB     Toileting (which includes using toilet bed pan or urinal) 2  -SD 2  -TB     Putting on and taking off regular upper body clothing 3  -SD 3  -TB     Taking care of personal grooming (such as brushing teeth) 3  -SD 3  -TB     Eating meals 3  -SD 3  -TB     Score 15  -SD 14  -TB        Functional Assessment    Outcome Measure Options AM-PAC 6 Clicks Daily Activity (OT)  -SD AM-PAC 6 Clicks Daily Activity (OT)  -TB       User Key  (r) = Recorded By, (t) = Taken By, (c) = Cosigned By    Initials Name Provider Type    Shelbie Tavarez, OT Occupational Therapist    Savanah Ignacio, OT Occupational Therapist           Time Calculation:         Time Calculation- OT     Row Name 09/17/18 1005             Time Calculation- OT    OT Start Time 1005  -SD      OT Stop Time 1030  -SD      OT Time Calculation (min) 25 min  -SD      OT Received On 09/17/18  -SD      OT Goal Re-Cert Due Date 09/24/18  -SD         Timed Charges    42952 - OT Self Care/Mgmt Minutes 10  -SD        User Key  (r) = Recorded By, (t) = Taken By, (c) = Cosigned By    Initials Name Provider Type    Savanah Ignacio, OT Occupational Therapist           Therapy Suggested Charges     Code   Minutes Charges    34166 (CPT®) Hc Ot Neuromusc Re Education Ea 15 Min      49662 (CPT®) Hc Ot Ther Proc Ea 15 Min      73450 (CPT®) Hc Ot Therapeutic Act Ea 15 Min      38933 (CPT®) Hc Ot Manual Therapy Ea 15 Min      23207 (CPT®) Hc Ot Iontophoresis Ea 15 Min      19070 (CPT®) Hc Ot Elec Stim Ea-Per 15 Min      33701 (CPT®) Hc Ot Ultrasound Ea 15 Min      82121 (CPT®) Hc Ot Self Care/Mgmt/Train Ea 15 Min 10 1    Total  10 1        Therapy Charges for  Today     Code Description Service Date Service Provider Modifiers Qty    [id number] HC OT SELF CARE/MGMT/TRAIN EA 15 MIN 9/17/2018 Savanah Hargrove, OT GO 2               Savanah Hargrove, OT  9/17/2018

## 2018-09-17 NOTE — PLAN OF CARE
Problem: Patient Care Overview  Goal: Plan of Care Review  Outcome: Ongoing (interventions implemented as appropriate)   09/17/18 6095   Plan of Care Review   Progress improving   OTHER   Outcome Summary Pt. up in chair. Had a couple BM today. Pt. ambulated in room. BLE edema is improving. Continue to monitor.    Coping/Psychosocial   Plan of Care Reviewed With patient       Problem: Fall Risk (Adult)  Goal: Identify Related Risk Factors and Signs and Symptoms  Outcome: Ongoing (interventions implemented as appropriate)   09/14/18 0407   Fall Risk (Adult)   Related Risk Factors (Fall Risk) age-related changes;bladder function altered;fatigue/slow reaction;gait/mobility problems;history of falls;sleep pattern alteration

## 2018-09-17 NOTE — PLAN OF CARE
Problem: Patient Care Overview  Goal: Plan of Care Review  Outcome: Ongoing (interventions implemented as appropriate)   09/17/18 1042   Plan of Care Review   Progress improving   OTHER   Outcome Summary Pt. participated in functional mobility & ADL skills. Pt. ambulated from bathroom to b/s chair with CGA-min A using standard wx. Pt. completed grooming skills from sitting with set-up - min A. Cont OT POC.   Coping/Psychosocial   Plan of Care Reviewed With patient;daughter

## 2018-09-17 NOTE — PLAN OF CARE
Problem: Patient Care Overview  Goal: Plan of Care Review  Outcome: Ongoing (interventions implemented as appropriate)   09/17/18 0430   Plan of Care Review   Progress no change   OTHER   Outcome Summary VSS, pt still c/o tender BLE, mild edema in legs that has improved. rested well, refused to turn    Coping/Psychosocial   Plan of Care Reviewed With patient     Goal: Individualization and Mutuality  Outcome: Ongoing (interventions implemented as appropriate)    Goal: Discharge Needs Assessment  Outcome: Ongoing (interventions implemented as appropriate)    Goal: Interprofessional Rounds/Family Conf  Outcome: Ongoing (interventions implemented as appropriate)      Problem: Fall Risk (Adult)  Goal: Identify Related Risk Factors and Signs and Symptoms  Outcome: Ongoing (interventions implemented as appropriate)    Goal: Absence of Fall  Outcome: Ongoing (interventions implemented as appropriate)      Problem: Skin Injury Risk (Adult)  Goal: Identify Related Risk Factors and Signs and Symptoms  Outcome: Ongoing (interventions implemented as appropriate)    Goal: Skin Health and Integrity  Outcome: Ongoing (interventions implemented as appropriate)

## 2018-09-17 NOTE — PLAN OF CARE
Problem: Patient Care Overview  Goal: Plan of Care Review  Outcome: Ongoing (interventions implemented as appropriate)   09/17/18 1101   OTHER   Outcome Summary Pt. pleasant; however, limited by grief/motivation. Pt's dtr present during PT, encouraging patient's mobility. Pt. required increased assistance w/ lower surface transfers (toilet), but progressed forward ambulation to 30 ft w/ min A and cues for safety. Gait distance limited by fatigue/weakness/grief. Recommend cont IPPT per POC.   Coping/Psychosocial   Plan of Care Reviewed With patient;daughter

## 2018-09-17 NOTE — PROGRESS NOTES
Continued Stay Note  [facility]     Patient Name: Dacia Nelson  MRN: [id number]  Today's Date: 9/17/2018    Admit Date: 9/13/2018          Discharge Plan     Row Name 09/17/18 1156       Plan    Plan Skilled placement    Plan Comments Currently, [facility] have a bed at [facility] today, none at [facility]. Cherelle Evans will get back with me. I did speak with Mandy to let he know this.     Anticipate transfer tomorrow to [facility].     Final Discharge Disposition Code 03 - skilled nursing facility (SNF)              Discharge Codes    No documentation.       Expected Discharge Date and Time     Expected Discharge Date Expected Discharge Time    Sep 18, 2018             Cherelle Loya RN

## 2018-09-17 NOTE — PROGRESS NOTES
[facility]  PROGRESS NOTE    Patient Name: Dacia Nelson  : 1925  MRN: [id number]    Date of Admission: 2018  Length of Stay: 4  Primary Care Physician: Momo Breaux PA    Subjective   Subjective     CC: f/u UTI and ESHA    HPI:No acute events overnight, patient states that she is sleepy, denies any fevers or chills    Review of Systems  Gen- No fevers, chills  CV- No chest pain, palpitations  Resp- No cough, dyspnea  GI- No N/V/D, abd pain    Otherwise ROS is negative except as mentioned in the HPI.    Objective   Objective     Vital Signs:   Temp:  [97.5 °F (36.4 °C)-98.6 °F (37 °C)] 97.5 °F (36.4 °C)  Heart Rate:  [69-81] 69  Resp:  [16-20] 18  BP: (136-165)/(65-77) 164/77        Physical Exam:  Constitutional: No acute distress, awake, alert  HENT: NCAT, mucous membranes moist  Respiratory: Clear to auscultation bilaterally, respiratory effort normal   Cardiovascular: RRR, no murmurs, rubs, or gallops, palpable pedal pulses bilaterally  Gastrointestinal: Positive bowel sounds, soft, nontender, nondistended  Musculoskeletal: Bilateral lower extremity edema improved 1+  Psychiatric: Depressed mood, Appropriate affect, cooperative  Neurologic: Oriented x 3, no focal deficits  Skin: Chronic venous stasis changes    Results Reviewed:  I have personally reviewed current lab, radiology, and data and agree.      Results from last 7 days  Lab Units 09/15/18  0520 18  0606 18  1637   WBC 10*3/mm3 5.63 3.29* 6.56   HEMOGLOBIN g/dL 9.9* 10.4* 11.8   HEMATOCRIT % 29.9* 30.6* 35.0   PLATELETS 10*3/mm3 164 149* 158       Results from last 7 days  Lab Units 18  0531 18  0929 18  0523  18  1637   SODIUM mmol/L 136 134 139  < > 127*   POTASSIUM mmol/L 4.4 4.3 4.5  < > 4.9   CHLORIDE mmol/L 108 105 108  < > 91*   CO2 mmol/L 23.0 22.0 21.0  < > 19.0*   BUN mg/dL 26* 27* 31*  < > 38*   CREATININE mg/dL 1.25 1.40* 1.41*  < >  2.12*   GLUCOSE mg/dL 108* 106* 116*  < > 98   CALCIUM mg/dL 7.6* 7.8* 7.4*  < > 8.4*   ALT (SGPT) U/L  --   --   --   --  37   AST (SGOT) U/L  --   --   --   --  31   < > = values in this interval not displayed.  Estimated Creatinine Clearance: 26.3 mL/min (by C-G formula based on SCr of 1.25 mg/dL).  No results found for: BNP    Microbiology Results Abnormal     Procedure Component Value - Date/Time    Urine Culture - Urine, [[id number]]  (Abnormal)  (Susceptibility) Collected:  09/13/18 1813    Lab Status:  Final result Specimen:  Urine from Urine, Clean Catch Updated:  09/15/18 0928     Urine Culture >100,000 CFU/mL Escherichia coli (A)    Susceptibility      Escherichia coli     RICO     Amikacin <=16 ug/ml Susceptible     Ampicillin >16 ug/ml Resistant     Ampicillin + Sulbactam 16/8 ug/ml Intermediate     Aztreonam <=8 ug/ml Susceptible     Cefepime <=8 ug/ml Susceptible     Cefotaxime <=2 ug/ml Susceptible     Ceftriaxone <=8 ug/ml Susceptible     Cefuroxime sodium <=4 ug/ml Susceptible     Cephalothin 16 ug/ml Intermediate     Ertapenem <=1 ug/ml Susceptible     Gentamicin >8 ug/ml Resistant     Levofloxacin <=2 ug/ml Susceptible     Meropenem <=1 ug/ml Susceptible     Nitrofurantoin <=32 ug/ml Susceptible     Piperacillin + Tazobactam <=16 ug/ml Susceptible     Tetracycline >8 ug/ml Resistant     Tobramycin 8 ug/ml Intermediate     Trimethoprim + Sulfamethoxazole >2/38 ug/ml Resistant                          Imaging Results (last 24 hours)     ** No results found for the last 24 hours. **             I have reviewed the medications.      dexamethasone 4 mg Intravenous Q6H   famotidine 20 mg Oral Daily   heparin (porcine) 5,000 Units Subcutaneous Q12H   multivitamin with minerals 1 tablet Oral Daily   pantoprazole 40 mg Oral Q AM   potassium chloride 10 mEq Oral Daily   sertraline 50 mg Oral Daily   sodium chloride 500 mL Intravenous Once         Assessment/Plan   Assessment / Plan     Hospital Problem List      * (Principal)Acute renal failure (CMS/HCC)    Essential hypertension    Irritable bowel syndrome with both constipation and diarrhea    FTT (failure to thrive) in adult    Hyponatremia    Dehydration    UTI (urinary tract infection)    Bereavement    Anemia    Chronic venous stasis dermatitis of both lower extremities        Brief Hospital Course to date:  Dacia Nelson is a 89 y.o. female with a past medical history of hypertension, currently creating as her   recently, presented with anorexia, dehydration, weakness found to have UTI and ESHA    Assessment & Plan:  - ESHA suspect this was prerenal secondary to dehydration, creatinine improving now at baseline,  continue IV fluids, hold home Lasix, d/c IVF  - Ecoli UTI, she status post 3 days off Invanz  - Bilateral lower extremity swelling, s/p bilateral duplex preliminary read negative, f/u final read.  - Ongoing bereavement, suspect some depression, continue zoloft    DVT Prophylaxis: [facility]    CODE STATUS:   Code Status and Medical Interventions:   Ordered at: 18     Limited Support to NOT Include:    Intubation    Dialysis    Cardioversion/Defibrillation    Artificial Nutrition     Level Of Support Discussed With:    Patient     Code Status:    No CPR     Medical Interventions (Level of Support Prior to Arrest):    Limited       Disposition: anticipate d/c to [facility] in AM, CM following.      Electronically signed by Too Alberto MD, 18, 12:40 PM.

## 2018-09-18 PROBLEM — N17.9 ACUTE RENAL FAILURE (HCC): Status: RESOLVED | Noted: 2018-09-13 | Resolved: 2018-09-18

## 2018-09-18 PROBLEM — E87.1 HYPONATREMIA: Status: RESOLVED | Noted: 2018-09-13 | Resolved: 2018-09-18

## 2018-09-18 PROBLEM — N39.0 UTI (URINARY TRACT INFECTION): Status: RESOLVED | Noted: 2018-09-13 | Resolved: 2018-09-18

## 2018-09-18 PROBLEM — E86.0 DEHYDRATION: Status: RESOLVED | Noted: 2018-09-13 | Resolved: 2018-09-18

## 2018-09-18 LAB — INTERPRETATION UR IFE-IMP: NORMAL

## 2018-09-18 PROCEDURE — 25010000002 DEXAMETHASONE PER 1 MG: Performed by: NURSE PRACTITIONER

## 2018-09-18 PROCEDURE — 25010000002 DEXAMETHASONE PER 1 MG: Performed by: INTERNAL MEDICINE

## 2018-09-18 PROCEDURE — 25010000002 HEPARIN (PORCINE) PER 1000 UNITS: Performed by: INTERNAL MEDICINE

## 2018-09-18 PROCEDURE — 99233 SBSQ HOSP IP/OBS HIGH 50: CPT | Performed by: INTERNAL MEDICINE

## 2018-09-18 RX ORDER — DEXAMETHASONE 4 MG/1
4 TABLET ORAL EVERY 8 HOURS SCHEDULED
Status: DISPENSED | OUTPATIENT
Start: 2018-09-19 | End: 2018-09-22

## 2018-09-18 RX ORDER — DEXAMETHASONE 4 MG/1
4 TABLET ORAL
Status: DISCONTINUED | OUTPATIENT
Start: 2018-09-25 | End: 2018-09-22

## 2018-09-18 RX ORDER — GABAPENTIN 100 MG/1
100 CAPSULE ORAL EVERY 8 HOURS SCHEDULED
Status: DISCONTINUED | OUTPATIENT
Start: 2018-09-18 | End: 2018-09-19

## 2018-09-18 RX ORDER — DEXAMETHASONE 4 MG/1
2 TABLET ORAL
Status: DISCONTINUED | OUTPATIENT
Start: 2018-09-28 | End: 2018-09-22

## 2018-09-18 RX ORDER — DEXAMETHASONE 4 MG/1
4 TABLET ORAL EVERY 12 HOURS SCHEDULED
Status: DISCONTINUED | OUTPATIENT
Start: 2018-09-22 | End: 2018-09-22

## 2018-09-18 RX ADMIN — DEXAMETHASONE SODIUM PHOSPHATE 4 MG: 4 INJECTION, SOLUTION INTRAMUSCULAR; INTRAVENOUS at 05:49

## 2018-09-18 RX ADMIN — GABAPENTIN 100 MG: 100 CAPSULE ORAL at 16:21

## 2018-09-18 RX ADMIN — SERTRALINE HYDROCHLORIDE 50 MG: 50 TABLET ORAL at 09:41

## 2018-09-18 RX ADMIN — FAMOTIDINE 20 MG: 20 TABLET, FILM COATED ORAL at 09:41

## 2018-09-18 RX ADMIN — POTASSIUM CHLORIDE 10 MEQ: 750 CAPSULE, EXTENDED RELEASE ORAL at 09:41

## 2018-09-18 RX ADMIN — HEPARIN SODIUM 5000 UNITS: 5000 INJECTION, SOLUTION INTRAVENOUS; SUBCUTANEOUS at 21:12

## 2018-09-18 RX ADMIN — PANTOPRAZOLE SODIUM 40 MG: 40 TABLET, DELAYED RELEASE ORAL at 05:49

## 2018-09-18 RX ADMIN — HEPARIN SODIUM 5000 UNITS: 5000 INJECTION, SOLUTION INTRAVENOUS; SUBCUTANEOUS at 09:41

## 2018-09-18 RX ADMIN — DEXAMETHASONE SODIUM PHOSPHATE 4 MG: 4 INJECTION, SOLUTION INTRAMUSCULAR; INTRAVENOUS at 17:09

## 2018-09-18 RX ADMIN — MULTIPLE VITAMINS W/ MINERALS TAB 1 TABLET: TAB ORAL at 09:41

## 2018-09-18 RX ADMIN — DEXAMETHASONE SODIUM PHOSPHATE 4 MG: 4 INJECTION, SOLUTION INTRAMUSCULAR; INTRAVENOUS at 12:40

## 2018-09-18 RX ADMIN — GABAPENTIN 100 MG: 100 CAPSULE ORAL at 21:12

## 2018-09-18 RX ADMIN — DEXAMETHASONE SODIUM PHOSPHATE 4 MG: 4 INJECTION, SOLUTION INTRAMUSCULAR; INTRAVENOUS at 00:59

## 2018-09-18 NOTE — PLAN OF CARE
Problem: Patient Care Overview  Goal: Plan of Care Review  Outcome: Ongoing (interventions implemented as appropriate)   09/18/18 4147   Plan of Care Review   Progress no change   OTHER   Outcome Summary pt rested well through night.refuses to turn while in bed. up with assistance, no appetite today    Coping/Psychosocial   Plan of Care Reviewed With patient     Goal: Individualization and Mutuality  Outcome: Ongoing (interventions implemented as appropriate)    Goal: Discharge Needs Assessment  Outcome: Ongoing (interventions implemented as appropriate)    Goal: Interprofessional Rounds/Family Conf  Outcome: Ongoing (interventions implemented as appropriate)      Problem: Fall Risk (Adult)  Goal: Identify Related Risk Factors and Signs and Symptoms  Outcome: Ongoing (interventions implemented as appropriate)    Goal: Absence of Fall  Outcome: Ongoing (interventions implemented as appropriate)      Problem: Skin Injury Risk (Adult)  Goal: Identify Related Risk Factors and Signs and Symptoms  Outcome: Ongoing (interventions implemented as appropriate)    Goal: Skin Health and Integrity  Outcome: Ongoing (interventions implemented as appropriate)

## 2018-09-18 NOTE — PLAN OF CARE
Problem: Patient Care Overview  Goal: Plan of Care Review  Outcome: Ongoing (interventions implemented as appropriate)    Goal: Individualization and Mutuality  Outcome: Ongoing (interventions implemented as appropriate)    Goal: Discharge Needs Assessment  Outcome: Ongoing (interventions implemented as appropriate)    Goal: Interprofessional Rounds/Family Conf  Outcome: Ongoing (interventions implemented as appropriate)      Problem: Fall Risk (Adult)  Goal: Identify Related Risk Factors and Signs and Symptoms  Outcome: Outcome(s) achieved Date Met: 09/18/18    Goal: Absence of Fall  Outcome: Ongoing (interventions implemented as appropriate)      Problem: Skin Injury Risk (Adult)  Goal: Identify Related Risk Factors and Signs and Symptoms  Outcome: Outcome(s) achieved Date Met: 09/18/18    Goal: Skin Health and Integrity  Outcome: Ongoing (interventions implemented as appropriate)

## 2018-09-18 NOTE — PROGRESS NOTES
Case Management Discharge Note    Final Note: To [facility] skilled [facility] today. Daughter to transport.  Tele [phone]    Destination - Selection Complete     Service Request Status Selected Specialties Address Phone Number Fax Number    [facility] Selected Skilled Nursing Facility [address], [facility] [address] [phone]      Durable Medical Equipment     No service has been selected for the patient.      Dialysis/Infusion     No service has been selected for the patient.      Home Medical Care     No service has been selected for the patient.      Social Care     No service has been selected for the patient.             Final Discharge Disposition Code: 03 - skilled nursing facility (SNF)

## 2018-09-18 NOTE — PROGRESS NOTES
[facility]  PROGRESS NOTE    Patient Name: Dacia Nelson  : 1925  MRN: [id number]    Date of Admission: 2018  Length of Stay: 5  Primary Care Physician: Momo Breaux PA    Subjective   Subjective     CC: f/u for BLE pain, UTI and ESHA    HPI:Patient does complain of ongoing epigastric abd pain. BLE pain is persistent, PO intake is poor.    Review of Systems  Gen- No fevers, chills  CV- No chest pain, palpitations  Resp- No cough, dyspnea  GI- No N/V/D, abd pain  Otherwise ROS is negative except as mentioned in the HPI.    Objective   Objective     Vital Signs:   Temp:  [97.4 °F (36.3 °C)-98.1 °F (36.7 °C)] 97.8 °F (36.6 °C)  Heart Rate:  [59-73] 59  Resp:  [18] 18  BP: (145-164)/(68-75) 160/75      Physical Exam:  Constitutional: No acute distress, awake, alert  HENT: NCAT, mucous membranes moist  Respiratory: Clear to auscultation bilaterally, respiratory effort normal   Cardiovascular: RRR, no murmurs, rubs, or gallops, palpable pedal pulses bilaterally  Gastrointestinal: Positive bowel sounds, soft, nontender, nondistended  Musculoskeletal: No bilateral ankle edema  Psychiatric: Appropriate affect, cooperative  Neurologic: Oriented x 3, no focal deficit  Skin: No rashes    Results Reviewed:  I have personally reviewed current lab, radiology, and data and agree.      Results from last 7 days  Lab Units 09/15/18  0520 18  0606 18  1637   WBC 10*3/mm3 5.63 3.29* 6.56   HEMOGLOBIN g/dL 9.9* 10.4* 11.8   HEMATOCRIT % 29.9* 30.6* 35.0   PLATELETS 10*3/mm3 164 149* 158       Results from last 7 days  Lab Units 18  0531 18  0929 18  0523  18  1637   SODIUM mmol/L 136 134 139  < > 127*   POTASSIUM mmol/L 4.4 4.3 4.5  < > 4.9   CHLORIDE mmol/L 108 105 108  < > 91*   CO2 mmol/L 23.0 22.0 21.0  < > 19.0*   BUN mg/dL 26* 27* 31*  < > 38*   CREATININE mg/dL 1.25 1.40* 1.41*  < > 2.12*   GLUCOSE mg/dL 108* 106* 116*  < > 98    CALCIUM mg/dL 7.6* 7.8* 7.4*  < > 8.4*   ALT (SGPT) U/L  --   --   --   --  37   AST (SGOT) U/L  --   --   --   --  31   < > = values in this interval not displayed.  Estimated Creatinine Clearance: 26.3 mL/min (by C-G formula based on SCr of 1.25 mg/dL).  No results found for: BNP    Microbiology Results Abnormal     Procedure Component Value - Date/Time    Urine Culture - Urine, [[id number]]  (Abnormal)  (Susceptibility) Collected:  09/13/18 1813    Lab Status:  Final result Specimen:  Urine from Urine, Clean Catch Updated:  09/15/18 0928     Urine Culture >100,000 CFU/mL Escherichia coli (A)    Susceptibility      Escherichia coli     RICO     Amikacin <=16 ug/ml Susceptible     Ampicillin >16 ug/ml Resistant     Ampicillin + Sulbactam 16/8 ug/ml Intermediate     Aztreonam <=8 ug/ml Susceptible     Cefepime <=8 ug/ml Susceptible     Cefotaxime <=2 ug/ml Susceptible     Ceftriaxone <=8 ug/ml Susceptible     Cefuroxime sodium <=4 ug/ml Susceptible     Cephalothin 16 ug/ml Intermediate     Ertapenem <=1 ug/ml Susceptible     Gentamicin >8 ug/ml Resistant     Levofloxacin <=2 ug/ml Susceptible     Meropenem <=1 ug/ml Susceptible     Nitrofurantoin <=32 ug/ml Susceptible     Piperacillin + Tazobactam <=16 ug/ml Susceptible     Tetracycline >8 ug/ml Resistant     Tobramycin 8 ug/ml Intermediate     Trimethoprim + Sulfamethoxazole >2/38 ug/ml Resistant                        I have reviewed the medications.      dexamethasone 4 mg Intravenous Q6H   famotidine 20 mg Oral Daily   gabapentin 100 mg Oral Q8H   heparin (porcine) 5,000 Units Subcutaneous Q12H   multivitamin with minerals 1 tablet Oral Daily   pantoprazole 40 mg Oral Q AM   potassium chloride 10 mEq Oral Daily   sertraline 50 mg Oral Daily   sodium chloride 500 mL Intravenous Once         Assessment/Plan   Assessment / Plan     Hospital Problem List     Essential hypertension    Irritable bowel syndrome with both constipation and diarrhea    FTT (failure to  thrive) in adult    Bereavement    Anemia    Chronic venous stasis dermatitis of both lower extremities         Brief Hospital Course to date:  Dacia Nelson is a 89 y.o. 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  - Ongoing bereavement, suspect some depression, continue Zoloft  - Encourage to ambulate/sit on chair, PT/OT following.     DVT Prophylaxis:  [facility]    CODE STATUS:   Code Status and Medical Interventions:   Ordered at: 18     Limited Support to NOT Include:    Intubation    Dialysis    Cardioversion/Defibrillation    Artificial Nutrition     Level Of Support Discussed With:    Patient     Code Status:    No CPR     Medical Interventions (Level of Support Prior to Arrest):    Limited       Disposition: [facility], CM following.    Electronically signed by Too Alberto MD, 18, 12:01 PM.

## 2018-09-19 PROBLEM — K92.2 UPPER GI BLEED: Status: ACTIVE | Noted: 2018-09-13

## 2018-09-19 LAB
ABO GROUP BLD: NORMAL
ABO GROUP BLD: NORMAL
ALBUMIN SERPL-MCNC: 2.35 G/DL (ref 3.2–4.8)
ALBUMIN/GLOB SERPL: 0.9 G/DL (ref 1.5–2.5)
ALP SERPL-CCNC: 87 U/L (ref 25–100)
ALT SERPL W P-5'-P-CCNC: 60 U/L (ref 7–40)
ANION GAP SERPL CALCULATED.3IONS-SCNC: 6 MMOL/L (ref 3–11)
ANION GAP SERPL CALCULATED.3IONS-SCNC: 7 MMOL/L (ref 3–11)
AST SERPL-CCNC: 60 U/L (ref 0–33)
BASOPHILS # BLD AUTO: 0.01 10*3/MM3 (ref 0–0.2)
BASOPHILS NFR BLD AUTO: 0.1 % (ref 0–1)
BILIRUB SERPL-MCNC: 0.4 MG/DL (ref 0.3–1.2)
BLD GP AB SCN SERPL QL: NEGATIVE
BUN BLD-MCNC: 28 MG/DL (ref 9–23)
BUN BLD-MCNC: 31 MG/DL (ref 9–23)
BUN/CREAT SERPL: 23.9 (ref 7–25)
BUN/CREAT SERPL: 24.2 (ref 7–25)
CALCIUM SPEC-SCNC: 7.6 MG/DL (ref 8.7–10.4)
CALCIUM SPEC-SCNC: 7.7 MG/DL (ref 8.7–10.4)
CHLORIDE SERPL-SCNC: 107 MMOL/L (ref 99–109)
CHLORIDE SERPL-SCNC: 108 MMOL/L (ref 99–109)
CO2 SERPL-SCNC: 22 MMOL/L (ref 20–31)
CO2 SERPL-SCNC: 23 MMOL/L (ref 20–31)
CREAT BLD-MCNC: 1.17 MG/DL (ref 0.6–1.3)
CREAT BLD-MCNC: 1.28 MG/DL (ref 0.6–1.3)
DEPRECATED RDW RBC AUTO: 58.7 FL (ref 37–54)
EOSINOPHIL # BLD AUTO: 0.01 10*3/MM3 (ref 0–0.3)
EOSINOPHIL NFR BLD AUTO: 0.1 % (ref 0–3)
ERYTHROCYTE [DISTWIDTH] IN BLOOD BY AUTOMATED COUNT: 15.9 % (ref 11.3–14.5)
GFR SERPL CREATININE-BSD FRML MDRD: 39 ML/MIN/1.73
GFR SERPL CREATININE-BSD FRML MDRD: 43 ML/MIN/1.73
GLOBULIN UR ELPH-MCNC: 2.7 GM/DL
GLUCOSE BLD-MCNC: 152 MG/DL (ref 70–100)
GLUCOSE BLD-MCNC: 87 MG/DL (ref 70–100)
GLUCOSE BLDC GLUCOMTR-MCNC: 222 MG/DL (ref 70–130)
HCT VFR BLD AUTO: 20.5 % (ref 34.5–44)
HCT VFR BLD AUTO: 27.8 % (ref 34.5–44)
HGB BLD-MCNC: 6.8 G/DL (ref 11.5–15.5)
HGB BLD-MCNC: 9.3 G/DL (ref 11.5–15.5)
IMM GRANULOCYTES # BLD: 0.05 10*3/MM3 (ref 0–0.03)
IMM GRANULOCYTES NFR BLD: 0.4 % (ref 0–0.6)
LYMPHOCYTES # BLD AUTO: 2.48 10*3/MM3 (ref 0.6–4.8)
LYMPHOCYTES NFR BLD AUTO: 20.7 % (ref 24–44)
MCH RBC QN AUTO: 33.9 PG (ref 27–31)
MCHC RBC AUTO-ENTMCNC: 33.5 G/DL (ref 32–36)
MCV RBC AUTO: 101.5 FL (ref 80–99)
MONOCYTES # BLD AUTO: 1.24 10*3/MM3 (ref 0–1)
MONOCYTES NFR BLD AUTO: 10.4 % (ref 0–12)
NEUTROPHILS # BLD AUTO: 8.22 10*3/MM3 (ref 1.5–8.3)
NEUTROPHILS NFR BLD AUTO: 68.7 % (ref 41–71)
PLATELET # BLD AUTO: 242 10*3/MM3 (ref 150–450)
PMV BLD AUTO: 10.6 FL (ref 6–12)
POTASSIUM BLD-SCNC: 4.6 MMOL/L (ref 3.5–5.5)
POTASSIUM BLD-SCNC: 5 MMOL/L (ref 3.5–5.5)
PROT SERPL-MCNC: 5 G/DL (ref 5.7–8.2)
RBC # BLD AUTO: 2.74 10*6/MM3 (ref 3.89–5.14)
RH BLD: POSITIVE
RH BLD: POSITIVE
SODIUM BLD-SCNC: 135 MMOL/L (ref 132–146)
SODIUM BLD-SCNC: 138 MMOL/L (ref 132–146)
T&S EXPIRATION DATE: NORMAL
WBC NRBC COR # BLD: 11.96 10*3/MM3 (ref 3.5–10.8)

## 2018-09-19 PROCEDURE — 86901 BLOOD TYPING SEROLOGIC RH(D): CPT

## 2018-09-19 PROCEDURE — 86900 BLOOD TYPING SEROLOGIC ABO: CPT

## 2018-09-19 PROCEDURE — 93005 ELECTROCARDIOGRAM TRACING: CPT | Performed by: INTERNAL MEDICINE

## 2018-09-19 PROCEDURE — 86900 BLOOD TYPING SEROLOGIC ABO: CPT | Performed by: INTERNAL MEDICINE

## 2018-09-19 PROCEDURE — 86901 BLOOD TYPING SEROLOGIC RH(D): CPT | Performed by: INTERNAL MEDICINE

## 2018-09-19 PROCEDURE — 85014 HEMATOCRIT: CPT | Performed by: INTERNAL MEDICINE

## 2018-09-19 PROCEDURE — 86923 COMPATIBILITY TEST ELECTRIC: CPT

## 2018-09-19 PROCEDURE — 86850 RBC ANTIBODY SCREEN: CPT | Performed by: INTERNAL MEDICINE

## 2018-09-19 PROCEDURE — 85025 COMPLETE CBC W/AUTO DIFF WBC: CPT | Performed by: INTERNAL MEDICINE

## 2018-09-19 PROCEDURE — 63710000001 DEXAMETHASONE PER 0.25 MG: Performed by: INTERNAL MEDICINE

## 2018-09-19 PROCEDURE — 82962 GLUCOSE BLOOD TEST: CPT

## 2018-09-19 PROCEDURE — 85018 HEMOGLOBIN: CPT | Performed by: INTERNAL MEDICINE

## 2018-09-19 PROCEDURE — 99233 SBSQ HOSP IP/OBS HIGH 50: CPT | Performed by: INTERNAL MEDICINE

## 2018-09-19 PROCEDURE — 93010 ELECTROCARDIOGRAM REPORT: CPT | Performed by: INTERNAL MEDICINE

## 2018-09-19 PROCEDURE — 25010000002 HEPARIN (PORCINE) PER 1000 UNITS: Performed by: INTERNAL MEDICINE

## 2018-09-19 PROCEDURE — 99222 1ST HOSP IP/OBS MODERATE 55: CPT | Performed by: PHYSICIAN ASSISTANT

## 2018-09-19 PROCEDURE — 80053 COMPREHEN METABOLIC PANEL: CPT | Performed by: INTERNAL MEDICINE

## 2018-09-19 PROCEDURE — 97110 THERAPEUTIC EXERCISES: CPT

## 2018-09-19 RX ORDER — PANTOPRAZOLE SODIUM 40 MG/10ML
80 INJECTION, POWDER, LYOPHILIZED, FOR SOLUTION INTRAVENOUS ONCE
Status: COMPLETED | OUTPATIENT
Start: 2018-09-19 | End: 2018-09-19

## 2018-09-19 RX ORDER — GABAPENTIN 100 MG/1
100 CAPSULE ORAL NIGHTLY
Status: DISCONTINUED | OUTPATIENT
Start: 2018-09-20 | End: 2018-09-24

## 2018-09-19 RX ORDER — GABAPENTIN 100 MG/1
100 CAPSULE ORAL NIGHTLY
Status: DISCONTINUED | OUTPATIENT
Start: 2018-09-19 | End: 2018-09-19

## 2018-09-19 RX ORDER — SODIUM CHLORIDE 9 MG/ML
75 INJECTION, SOLUTION INTRAVENOUS CONTINUOUS
Status: DISCONTINUED | OUTPATIENT
Start: 2018-09-19 | End: 2018-09-20

## 2018-09-19 RX ADMIN — SODIUM CHLORIDE 8 MG/HR: 9 INJECTION, SOLUTION INTRAVENOUS at 11:42

## 2018-09-19 RX ADMIN — SODIUM CHLORIDE 8 MG/HR: 9 INJECTION, SOLUTION INTRAVENOUS at 16:38

## 2018-09-19 RX ADMIN — DEXAMETHASONE 4 MG: 4 TABLET ORAL at 13:28

## 2018-09-19 RX ADMIN — SODIUM CHLORIDE 8 MG/HR: 9 INJECTION, SOLUTION INTRAVENOUS at 21:21

## 2018-09-19 RX ADMIN — GABAPENTIN 100 MG: 100 CAPSULE ORAL at 06:02

## 2018-09-19 RX ADMIN — PANTOPRAZOLE SODIUM 40 MG: 40 TABLET, DELAYED RELEASE ORAL at 06:02

## 2018-09-19 RX ADMIN — MULTIPLE VITAMINS W/ MINERALS TAB 1 TABLET: TAB ORAL at 09:08

## 2018-09-19 RX ADMIN — DEXAMETHASONE 4 MG: 4 TABLET ORAL at 21:02

## 2018-09-19 RX ADMIN — DEXAMETHASONE 4 MG: 4 TABLET ORAL at 06:02

## 2018-09-19 RX ADMIN — SERTRALINE HYDROCHLORIDE 50 MG: 50 TABLET ORAL at 09:08

## 2018-09-19 RX ADMIN — POTASSIUM CHLORIDE 10 MEQ: 750 CAPSULE, EXTENDED RELEASE ORAL at 09:08

## 2018-09-19 RX ADMIN — HEPARIN SODIUM 5000 UNITS: 5000 INJECTION, SOLUTION INTRAVENOUS; SUBCUTANEOUS at 09:08

## 2018-09-19 RX ADMIN — ACETAMINOPHEN 650 MG: 325 TABLET ORAL at 18:20

## 2018-09-19 RX ADMIN — FAMOTIDINE 20 MG: 20 TABLET, FILM COATED ORAL at 09:09

## 2018-09-19 RX ADMIN — SODIUM CHLORIDE 500 ML: 9 INJECTION, SOLUTION INTRAVENOUS at 15:48

## 2018-09-19 RX ADMIN — SODIUM CHLORIDE 75 ML/HR: 9 INJECTION, SOLUTION INTRAVENOUS at 13:32

## 2018-09-19 RX ADMIN — PANTOPRAZOLE SODIUM 80 MG: 40 INJECTION, POWDER, FOR SOLUTION INTRAVENOUS at 11:43

## 2018-09-19 RX ADMIN — SODIUM CHLORIDE 8 MG/HR: 9 INJECTION, SOLUTION INTRAVENOUS at 11:34

## 2018-09-19 NOTE — THERAPY TREATMENT NOTE
Acute Care - Physical Therapy Treatment Note  [facility]     Patient Name: Dacia Nelson  : 1925  MRN: [id number]  Today's Date: 2018  Onset of Illness/Injury or Date of Surgery: 18  Date of Referral to PT: 18  Referring Physician: LEXI Rodarte    Admit Date: 2018    Visit Dx:    ICD-10-CM ICD-9-CM   1. Acute renal failure, unspecified acute renal failure type (CMS/[facility]) N17.9 584.9   2. Dehydration with hyponatremia E87.1 276.1   3. Adult failure to thrive syndrome R62.7 783.7   4. Malnutrition compromising bodily function (CMS/[facility]) E46 263.9   5. Dependent edema R60.9 782.3   6. Impaired functional mobility, balance, gait, and endurance Z74.09 V49.89   7. Impaired mobility and ADLs Z74.09 799.89   8. Upper GI bleed K92.2 578.9     Patient Active Problem List   Diagnosis   • Essential hypertension   • Irritable bowel syndrome with both constipation and diarrhea   • Primary osteoarthritis involving multiple joints   • FTT (failure to thrive) in adult   • Bereavement   • Anemia   • Chronic venous stasis dermatitis of both lower extremities       Therapy Treatment          Rehabilitation Treatment Summary     Row Name 18 1308             Treatment Time/Intention    Discipline physical therapist  -LS      Document Type therapy note (daily note)  -LS      Subjective Information complains of;fatigue  -LS      Mode of Treatment physical therapy  -LS      Patient Effort good  -LS      Existing Precautions/Restrictions fall;other (see comments)   monitor BP  -LS      Treatment Considerations/Comments Pt with syncopal episode this AM and noted bloody stool; scheduled to undergo EGD . Deferred OOB activity today per pt/family request due to prior hypotension.   -LS      Recorded by [LS] Ramandeep Byrd, PT 18 1403      Row Name 18 1308             Vital Signs    Pre Systolic BP Rehab 90   RN aware and gave consent for supine ther ex  -LS      Pre Treatment Diastolic BP  43  -LS      O2 Delivery Pre Treatment nasal cannula  -LS      O2 Delivery Post Treatment supplemental O2  -LS      Pre Patient Position Supine  -LS      Intra Patient Position Supine  -LS      Post Patient Position Supine  -LS      Recorded by [LS] Ramandeep Byrd, PT 09/19/18 1403      Row Name 09/19/18 1308             Cognitive Assessment/Intervention- PT/OT    Affect/Mental Status (Cognitive) WFL  -LS      Orientation Status (Cognition) oriented x 4  -LS      Follows Commands (Cognition) follows one step commands;over 90% accuracy;repetition of directions required;verbal cues/prompting required  -LS      Cognitive Function (Cognitive) safety deficit  -LS      Safety Deficit (Cognitive) mild deficit;insight into deficits/self awareness;awareness of need for assistance;safety precautions awareness  -LS      Personal Safety Interventions supervised activity;muscle strengthening facilitated  -LS      Recorded by [LS] Ramandeep Byrd, PT 09/19/18 1403      Row Name 09/19/18 1308             Bed Mobility Assessment/Treatment    Comment (Bed Mobility) deferred today due to hypotension  -LS      Recorded by [LS] Ramandeep Byrd, PT 09/19/18 1403      Row Name 09/19/18 1308             Transfer Assessment/Treatment    Comment (Transfers) deferred today  -LS      Recorded by [LS] Ramandeep Byrd, PT 09/19/18 1403      Row Name 09/19/18 1308             Gait/Stairs Assessment/Training    Comment (Gait/Stairs) deferred  -LS      Recorded by [LS] Ramandeep Byrd, PT 09/19/18 1403      Row Name 09/19/18 1308             Motor Skills Assessment/Interventions    Additional Documentation Therapeutic Exercise (Group)  -LS      Recorded by [LS] Ramandeep Byrd, PT 09/19/18 1403      Row Name 09/19/18 1308             Therapeutic Exercise    Therapeutic Exercise supine, upper extremities;supine, lower extremities  -LS      52562 - PT Therapeutic Exercise Minutes 24  -LS      Recorded by [LS] Ramandeep Byrd, PT 09/19/18 1403      Row  Name 09/19/18 1308             Upper Extremity Supine Therapeutic Exercise    Performed, Supine Upper Extremity (Therapeutic Exercise) shoulder flexion/extension;shoulder abduction/adduction;elbow flexion/extension  -LS      Exercise Type, Supine Upper Extremity (Therapeutic Exercise) AROM (active range of motion)  -LS      Sets/Reps Detail, Supine Upper Extremity (Therapeutic Exercise) 1/10 BUE  -LS      Recorded by [LS] Ramandeep Byrd, PT 09/19/18 1403      Row Name 09/19/18 1308             Lower Extremity Supine Therapeutic Exercise    Performed, Supine Lower Extremity (Therapeutic Exercise) hip flexion/extension;hip abduction/adduction;ankle dorsiflexion/plantarflexion;quadriceps sets;heel slides  -LS      Exercise Type, Supine Lower Extremity (Therapeutic Exercise) AROM (active range of motion)  -LS      Sets/Reps Detail, Supine Lower Extremity (Therapeutic Exercise) 1/10 BLE  -LS      Recorded by [LS] Ramandeep Byrd, PT 09/19/18 1403      Row Name 09/19/18 1308             Positioning and Restraints    Pre-Treatment Position in bed  -LS      Post Treatment Position bed  -LS      In Bed notified nsg;fowlers;call light within reach;encouraged to call for assist;with family/caregiver;legs elevated;heels elevated  -LS      Recorded by [LS] Ramandeep Byrd, PT 09/19/18 1403      Row Name 09/19/18 1308             Pain Scale: Numbers Pre/Post-Treatment    Pain Scale: Numbers, Pretreatment 0/10 - no pain  -LS      Pain Scale: Numbers, Post-Treatment 0/10 - no pain  -LS      Recorded by [LS] Ramandeep Byrd, PT 09/19/18 1403      Row Name                Wound 09/09/18 0911 coccyx fissure    Wound - Properties Group Date first assessed: 09/09/18 [MC] Time first assessed: 0911 [MC] Location: coccyx [] Type: fissure [MC] Stage, Pressure Injury: Stage 1 [MC] Recorded by:  [MC] Chary Ferguson RN 09/16/18 0916    Row Name 09/19/18 1308             Plan of Care Review    Plan of Care Reviewed With patient;daughter  -       Recorded by [LS] Ramandeep Byrd, PT 09/19/18 1403      Row Name 09/19/18 1308             Outcome Summary/Treatment Plan (PT)    Daily Summary of Progress (PT) progress toward functional goals is gradual  -LS      Barriers to Overall Progress (PT) hypotension; r/o GI bleed  -LS      Recorded by [LS] Ramandeep Byrd, PT 09/19/18 1403        User Key  (r) = Recorded By, (t) = Taken By, (c) = Cosigned By    Initials Name Effective Dates Discipline    LS Ramandeep Byrd, PT 06/19/15 -  PT    Chary Gayle RN 04/17/18 -  Nurse          Wound 09/09/18 0911 coccyx fissure (Active)   Dressing Appearance dry;intact 9/19/2018 12:00 PM   Drainage Amount none 9/19/2018  8:00 AM   Dressing Care, Wound foam 9/19/2018  8:00 AM   Periwound Care, Wound dry periwound area maintained 9/19/2018  8:00 AM             Physical Therapy Education     Title: PT OT SLP Therapies (Done)     Topic: Physical Therapy (Done)     Point: Mobility training (Done)    Learning Progress Summary     Learner Status Readiness Method Response Comment Documented by    Patient Done Acceptance E,D VU,NR   09/19/18 1308     Done Acceptance E,TB VU  SC 09/19/18 0511     Done Acceptance E,TB VU  SC 09/18/18 0159     Done Acceptance E,TB VU  SC 09/17/18 0124     Active Acceptance E NR  KR 09/14/18 0924    Family Done Acceptance E,D VU,NR  LS 09/19/18 1308          Point: Home exercise program (Done)    Learning Progress Summary     Learner Status Readiness Method Response Comment Documented by    Patient Done Acceptance E,D VU,NR  LS 09/19/18 1308     Done Acceptance E,TB VU  SC 09/19/18 0511     Done Acceptance E,TB VU  SC 09/18/18 0159     Done Acceptance E,TB VU  SC 09/17/18 0124    Family Done Acceptance E,D VU,NR  LS 09/19/18 1308          Point: Body mechanics (Done)    Learning Progress Summary     Learner Status Readiness Method Response Comment Documented by    Patient Done Acceptance E,D VU,NR  LS 09/19/18 1308     Done Acceptance E,TB VU  SC  09/19/18 0511     Done Acceptance E,TB VU  SC 09/18/18 0159     Done Acceptance E,TB VU  SC 09/17/18 0124     Active Acceptance E NR  KR 09/14/18 0924    Family Done Acceptance E,D VU,NR  LS 09/19/18 1308          Point: Precautions (Done)    Learning Progress Summary     Learner Status Readiness Method Response Comment Documented by    Patient Done Acceptance E,D VU,NR   09/19/18 1308     Done Acceptance E,TB VU  SC 09/19/18 0511     Done Acceptance E,TB VU  SC 09/18/18 0159     Done Acceptance E,TB VU  SC 09/17/18 0124     Active Acceptance E NR  KR 09/14/18 0924    Family Done Acceptance E,D VU,NR   09/19/18 1308                      User Key     Initials Effective Dates Name Provider Type Discipline     06/19/15 -  Ramandeep Byrd, PT Physical Therapist PT    SC 06/16/16 -  Denise Luong RN Registered Nurse Nurse     04/03/18 -  Marylin Liu, PT Physical Therapist PT                    PT Recommendation and Plan     Outcome Summary/Treatment Plan (PT)  Daily Summary of Progress (PT): progress toward functional goals is gradual  Barriers to Overall Progress (PT): hypotension; r/o GI bleed  Plan of Care Reviewed With: patient, daughter  Progress: no change  Outcome Summary: Therapy session limited to supine ther ex today per pt/family request and due to pt's demonstration of hypotension/syncopal episode earlier this AM. Pt gave good effort with ther ex; edu pt/DTR re: benefit of further sets throughout day. Will cont to progress OOB/mobility next session as clinically warranted.          Outcome Measures     Row Name 09/19/18 1308 09/17/18 1100 09/17/18 1005       How much help from another person do you currently need...    Turning from your back to your side while in flat bed without using bedrails? 3  -LS 3  -MB  --    Moving from lying on back to sitting on the side of a flat bed without bedrails? 3  -LS 3  -MB  --    Moving to and from a bed to a chair (including a wheelchair)? 3  -LS 3  -MB  --     Standing up from a chair using your arms (e.g., wheelchair, bedside chair)? 2  -LS 2  -MB  --    Climbing 3-5 steps with a railing? 1  -LS 2  -MB  --    To walk in hospital room? 2  -LS 3  -MB  --    AM-PAC 6 Clicks Score 14  -LS 16  -MB  --       How much help from another is currently needed...    Putting on and taking off regular lower body clothing?  --  -- 2  -SD    Bathing (including washing, rinsing, and drying)  --  -- 2  -SD    Toileting (which includes using toilet bed pan or urinal)  --  -- 2  -SD    Putting on and taking off regular upper body clothing  --  -- 3  -SD    Taking care of personal grooming (such as brushing teeth)  --  -- 3  -SD    Eating meals  --  -- 3  -SD    Score  --  -- 15  -SD       Functional Assessment    Outcome Measure Options AM-PAC 6 Clicks Basic Mobility (PT)  -LS AM-PAC 6 Clicks Basic Mobility (PT)  -MB AM-PAC 6 Clicks Daily Activity (OT)  -SD      User Key  (r) = Recorded By, (t) = Taken By, (c) = Cosigned By    Initials Name Provider Type    Savanah Ignacio, OT Occupational Therapist    Ramandeep Gibson, PT Physical Therapist    Nyasia Herrera, PT Physical Therapist           Time Calculation:         PT Charges     Row Name 09/19/18 1308             Time Calculation    Start Time 1308  -      PT Received On 09/19/18  -         Time Calculation- PT    Total Timed Code Minutes- PT 24 minute(s)  -LS         Timed Charges    00466 - PT Therapeutic Exercise Minutes 24  -LS        User Key  (r) = Recorded By, (t) = Taken By, (c) = Cosigned By    Initials Name Provider Type    Ramandeep Gibson, PT Physical Therapist        Therapy Suggested Charges     Code   Minutes Charges    55645 (CPT®) Hc Pt Neuromusc Re Education Ea 15 Min      15835 (CPT®) Hc Pt Ther Proc Ea 15 Min 24 2    73200 (CPT®) Hc Gait Training Ea 15 Min      35893 (CPT®) Hc Pt Therapeutic Act Ea 15 Min      10428 (CPT®) Hc Pt Manual Therapy Ea 15 Min      89289 (CPT®) Hc Pt Iontophoresis  Ea 15 Min      50090 (CPT®) Hc Pt Elec Stim Ea-Per 15 Min      03138 (CPT®) Hc Pt Ultrasound Ea 15 Min      38335 (CPT®) Hc Pt Self Care/Mgmt/Train Ea 15 Min      00816 (CPT®) Hc Pt Prosthetic (S) Train Initial Encounter, Each 15 Min      44347 (CPT®) Hc Pt Orthotic(S)/Prosthetic(S) Encounter, Each 15 Min      04275 (CPT®) Hc Orthotic(S) Mgmt/Train Initial Encounter, Each 15min      Total  24 2        Therapy Charges for Today     Code Description Service Date Service Provider Modifiers Qty    30837103611 HC PT THER PROC EA 15 MIN 9/19/2018 Ramandeep Byrd, PT GP 2    08760861615 HC PT THER SUPP EA 15 MIN 9/19/2018 Ramandeep Byrd, PT GP 2          PT G-Codes  Outcome Measure Options: AM-PAC 6 Clicks Basic Mobility (PT)  AM-PAC 6 Clicks Score: 14  Score: 15    Ramandeep Byrd, PT  9/19/2018

## 2018-09-19 NOTE — NURSING NOTE
PCT was assisting patient up to the BR. Pt was sitting on the side of the bed and became unresponsive after having large bloody BM. Pt assisted back to bed. Code 19 called. Patient did open eyes spontaneously and aroused when spoken to shortly after assisted back to bed. Family at bedside.

## 2018-09-19 NOTE — PLAN OF CARE
Problem: Patient Care Overview  Goal: Plan of Care Review  Outcome: Outcome(s) achieved Date Met: 09/19/18 09/19/18 5093   Plan of Care Review   Progress no change   OTHER   Outcome Summary Therapy session limited to supine ther ex today per pt/family request and due to pt's demonstration of hypotension/syncopal episode earlier this AM. Pt gave good effort with ther ex; edu pt/DTR re: benefit of further sets throughout day. Will cont to progress OOB/mobility next session as clinically warranted.   Coping/Psychosocial   Plan of Care Reviewed With patient;daughter

## 2018-09-19 NOTE — PLAN OF CARE
Problem: Patient Care Overview  Goal: Plan of Care Review  Outcome: Ongoing (interventions implemented as appropriate)   09/19/18 0511   Plan of Care Review   Progress no change   OTHER   Outcome Summary pt rested well through night, no changes    Coping/Psychosocial   Plan of Care Reviewed With patient     Goal: Individualization and Mutuality  Outcome: Ongoing (interventions implemented as appropriate)    Goal: Discharge Needs Assessment  Outcome: Ongoing (interventions implemented as appropriate)    Goal: Interprofessional Rounds/Family Conf  Outcome: Ongoing (interventions implemented as appropriate)      Problem: Fall Risk (Adult)  Goal: Absence of Fall  Outcome: Ongoing (interventions implemented as appropriate)      Problem: Skin Injury Risk (Adult)  Goal: Skin Health and Integrity  Outcome: Ongoing (interventions implemented as appropriate)

## 2018-09-19 NOTE — CONSULTS
[facility]ology Consult    Referring Provider: Too Alberto MD   PCP: Momo Breaux PA    Reason for Consultation: Melena     Chief complaint: Melena and syncope     History of present illness:    Dacia Nelson is a 89 y.o. 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  She reports generalized decline since her  passed away in April.   She has had depression, poor po intake and intermittent epigastric pain that she attributed to his loss.  She was recently placed on Zantac outpatient with improvement in her epigastric discomfort.  She had not had any melena until today.      Allergies:  Eggs or egg-derived products; Penicillins; and Sulfa antibiotics    Scheduled Meds:    dexamethasone 4 mg Oral Q8H   Followed by      [START ON 9/22/2018] dexamethasone 4 mg Oral Q12H   Followed by      [START ON 9/25/2018] dexamethasone 4 mg Oral Daily With Breakfast   Followed by      [START ON 9/28/2018] dexamethasone 2 mg Oral Daily With Breakfast   [START ON 9/20/2018] gabapentin 100 mg Oral Nightly   heparin (porcine) 5,000 Units Subcutaneous Q12H   multivitamin with minerals 1 tablet Oral Daily   potassium chloride 10 mEq Oral Daily   sertraline 50 mg Oral Daily   sodium chloride 500 mL Intravenous Once        Infusions:    pantoprazole 8 mg/hr Last Rate: 8 mg/hr (09/19/18 1142)   sodium chloride 75 mL/hr        PRN Meds:  •  acetaminophen  •  ondansetron **OR** ondansetron  •  sodium chloride  •  sodium chloride    Home Meds:  Prescriptions Prior to Admission   Medication Sig Dispense Refill Last Dose   • diphenoxylate-atropine (LOMOTIL) 2.5-0.025 MG per tablet Take 2 tablets by mouth 2 (Two) Times a Day As Needed for Diarrhea.      • furosemide (LASIX) 20 MG tablet Take 20 mg by mouth Every Other Day.      • ondansetron ODT (ZOFRAN-ODT) 8 MG disintegrating tablet  Take 1 tablet by mouth Every 8 (Eight) Hours As Needed for Nausea or Vomiting. 15 tablet 0    • potassium chloride (K-DUR) 10 MEQ CR tablet Take 10 mEq by mouth Daily.      • raNITIdine (ZANTAC) 150 MG tablet Take 1 tablet by mouth 2 (Two) Times a Day. 60 tablet 11    • sertraline (ZOLOFT) 50 MG tablet Take 1 tablet by mouth Daily. 30 tablet 11    • ondansetron (ZOFRAN) 8 MG tablet Take 1 tablet by mouth Every 8 (Eight) Hours As Needed for Nausea or Vomiting. 15 tablet 1        ROS: Review of Systems   Constitutional: Positive for fatigue.   HENT: Negative for trouble swallowing and voice change.    Eyes: Negative.    Respiratory: Negative.    Cardiovascular: Negative.    Gastrointestinal: Positive for abdominal pain and blood in stool.   Endocrine: Negative.    Genitourinary: Negative.    Musculoskeletal: Positive for myalgias.        Pain to light sensation of the lower extremities    Skin: Negative.    Neurological: Positive for dizziness, syncope and weakness.   Hematological: Negative.    Psychiatric/Behavioral:        Depressed mood since her          PAST MED HX:  Past Medical History:   Diagnosis Date   • Arthritis    • Cellulitis    • Gastritis        PAST SURG HX:  Past Surgical History:   Procedure Laterality Date   • CHOLECYSTECTOMY     • HYSTERECTOMY         FAM HX:  History reviewed. No pertinent family history.    SOC HX:  Social History     Social History   • Marital status:      Spouse name: N/A   • Number of children: N/A   • Years of education: N/A     Occupational History   • Not on file.     Social History Main Topics   • Smoking status: Never Smoker   • Smokeless tobacco: Never Used   • Alcohol use 4.2 oz/week     7 Glasses of wine per week   • Drug use: No   • Sexual activity: Defer     Other Topics Concern   • Not on file     Social History Narrative   • No narrative on file       PHYSICAL EXAM  BP 97/53 (BP Location: Right arm, Patient Position: Lying)   Pulse 75   Temp  "98.6 °F (37 °C) (Oral)   Resp 18   Ht 157.5 cm (62\")   Wt 70.1 kg (154 lb 8 oz)   SpO2 97%   BMI 28.26 kg/m²   Wt Readings from Last 3 Encounters:   09/13/18 70.1 kg (154 lb 8 oz)   08/21/18 68 kg (150 lb)   08/13/18 68 kg (150 lb)   ,body mass index is 28.26 kg/m².  Physical Exam   Constitutional: She is oriented to person, place, and time. She appears well-developed and well-nourished. No distress.   HENT:   Head: Normocephalic and atraumatic.   Eyes: Scleral icterus is present.   Neck: Normal range of motion.   Cardiovascular: Normal rate and regular rhythm.    Pulmonary/Chest: Effort normal and breath sounds normal.   Abdominal: Soft. Bowel sounds are normal. She exhibits no distension. There is no tenderness.   Musculoskeletal:   Tender to light sensation of the lower extremities   Neurological: She is alert and oriented to person, place, and time.   Skin: Skin is warm and dry.   Psychiatric: Her behavior is normal.   Nursing note and vitals reviewed.      Results Review:   I reviewed the patient's new clinical results.    Lab Results   Component Value Date    WBC 11.96 (H) 09/19/2018    HGB 9.3 (L) 09/19/2018    HGB 9.9 (L) 09/15/2018    HGB 10.4 (L) 09/14/2018    HCT 27.8 (L) 09/19/2018    .5 (H) 09/19/2018     09/19/2018       No results found for: INR    Lab Results   Component Value Date    GLUCOSE 152 (H) 09/19/2018    BUN 31 (H) 09/19/2018    CREATININE 1.28 09/19/2018    EGFRIFNONA 39 (L) 09/19/2018    BCR 24.2 09/19/2018    CO2 22.0 09/19/2018    CALCIUM 7.6 (L) 09/19/2018    ALBUMIN 2.35 (L) 09/19/2018    ALKPHOS 87 09/19/2018    BILITOT 0.4 09/19/2018    ALT 60 (H) 09/19/2018    AST 60 (H) 09/19/2018     Vitamin B12 183   Iron 78.  Iron saturation 59 %.  TIBC 132     ASSESSMENTS/PLANS    1. Melena   2. Syncope   3. Epigastric pain  4. Macrocytic anemia, B12 deficiency     Suspect upper GI source of bleeding.  Risk factors for peptic ulcer disease with recent steroid use .  >>> IV " PPI  >>> Recommend EGD tomorrow.   Patient ate solids this morning.  NPO at midnight.  Clear liquids today   >>> Needs B12 replacement     I discussed the patients findings and my recommendations with patient and her daughter.      PREMA Ibarra  09/19/18  1:31 PM

## 2018-09-19 NOTE — PROGRESS NOTES
[facility] Medicine Services  PROGRESS NOTE    Patient Name: Dacia Nelson  : 1925  MRN: [id number]    Date of Admission: 2018  Length of Stay: 6  Primary Care Physician: Momo Breaux PA    Subjective   Subjective     CC:f/u for BLE pain, UTI and ESHA, GI bleed    HPI: This morning patient passed out upon attempting to stand, this was followed by large bloody BM, hemodynamic stable for now, denied any abd pain    Review of Systems  Gen- No fevers, chills  CV- No chest pain, palpitations  Resp- No cough, dyspnea  GI- No N/V/D, abd pain    Otherwise ROS is negative except as mentioned in the HPI.    Objective   Objective     Vital Signs:   Temp:  [97.9 °F (36.6 °C)-98 °F (36.7 °C)] 97.9 °F (36.6 °C)  Heart Rate:  [63-85] 85  Resp:  [18] 18  BP: (129-145)/(62-68) 129/63        Physical Exam:  Constitutional: No acute distress, awake, alert  HENT: NCAT, mucous membranes moist  Respiratory: Clear to auscultation bilaterally, respiratory effort normal   Cardiovascular: RRR, no murmurs, rubs, or gallops, palpable pedal pulses bilaterally  Gastrointestinal: Positive bowel sounds, soft, nontender, nondistended  Musculoskeletal: No bilateral ankle edema  Psychiatric: Appropriate affect, cooperative  Neurologic: Oriented x 3, no focal deficits  Skin: No rashes    Results Reviewed:  I have personally reviewed current lab, radiology, and data and agree.      Results from last 7 days  Lab Units 09/15/18  0520 18  0606 18  1637   WBC 10*3/mm3 5.63 3.29* 6.56   HEMOGLOBIN g/dL 9.9* 10.4* 11.8   HEMATOCRIT % 29.9* 30.6* 35.0   PLATELETS 10*3/mm3 164 149* 158       Results from last 7 days  Lab Units 18  0450 18  0531 18  0929  18  1637   SODIUM mmol/L 138 136 134  < > 127*   POTASSIUM mmol/L 4.6 4.4 4.3  < > 4.9   CHLORIDE mmol/L 108 108 105  < > 91*   CO2 mmol/L 23.0 23.0 22.0  < > 19.0*   BUN mg/dL 28* 26* 27*  < > 38*   CREATININE mg/dL 1.17  1.25 1.40*  < > 2.12*   GLUCOSE mg/dL 87 108* 106*  < > 98   CALCIUM mg/dL 7.7* 7.6* 7.8*  < > 8.4*   ALT (SGPT) U/L  --   --   --   --  37   AST (SGOT) U/L  --   --   --   --  31   < > = values in this interval not displayed.  Estimated Creatinine Clearance: 28.1 mL/min (by C-G formula based on SCr of 1.17 mg/dL).  No results found for: BNP    Microbiology Results Abnormal     Procedure Component Value - Date/Time    Urine Culture - Urine, [[id number]]  (Abnormal)  (Susceptibility) Collected:  09/13/18 1813    Lab Status:  Final result Specimen:  Urine from Urine, Clean Catch Updated:  09/15/18 0928     Urine Culture >100,000 CFU/mL Escherichia coli (A)    Susceptibility      Escherichia coli     RICO     Amikacin <=16 ug/ml Susceptible     Ampicillin >16 ug/ml Resistant     Ampicillin + Sulbactam 16/8 ug/ml Intermediate     Aztreonam <=8 ug/ml Susceptible     Cefepime <=8 ug/ml Susceptible     Cefotaxime <=2 ug/ml Susceptible     Ceftriaxone <=8 ug/ml Susceptible     Cefuroxime sodium <=4 ug/ml Susceptible     Cephalothin 16 ug/ml Intermediate     Ertapenem <=1 ug/ml Susceptible     Gentamicin >8 ug/ml Resistant     Levofloxacin <=2 ug/ml Susceptible     Meropenem <=1 ug/ml Susceptible     Nitrofurantoin <=32 ug/ml Susceptible     Piperacillin + Tazobactam <=16 ug/ml Susceptible     Tetracycline >8 ug/ml Resistant     Tobramycin 8 ug/ml Intermediate     Trimethoprim + Sulfamethoxazole >2/38 ug/ml Resistant                        I have reviewed the medications.      dexamethasone 4 mg Oral Q8H   Followed by      [START ON 9/22/2018] dexamethasone 4 mg Oral Q12H   Followed by      [START ON 9/25/2018] dexamethasone 4 mg Oral Daily With Breakfast   Followed by      [START ON 9/28/2018] dexamethasone 2 mg Oral Daily With Breakfast   [START ON 9/20/2018] gabapentin 100 mg Oral Nightly   heparin (porcine) 5,000 Units Subcutaneous Q12H   multivitamin with minerals 1 tablet Oral Daily   pantoprazole 80 mg Intravenous Once    potassium chloride 10 mEq Oral Daily   sertraline 50 mg Oral Daily   sodium chloride 500 mL Intravenous Once         Assessment/Plan   Assessment / Plan     Hospital Problem List     Essential hypertension    Irritable bowel syndrome with both constipation and diarrhea    FTT (failure to thrive) in adult    Bereavement    Anemia    Chronic venous stasis dermatitis of both lower extremities          Brief Hospital Course to date:  Dacia Nelson is a 89 y.o. female with a past medical history of hypertension, currently creating as her   recently, presented with anorexia, dehydration, weakness found to have UTI and ESHA     Assessment & Plan:  - Suspected GI bleed, large BM with syncope ?check stat h/h, type and screen, repeat CMP, will give 500 ml bolus, start Protonix gtt, move to tele and consult GI.  - Syncope likely sec to acute blood loss, hemodynamic stable, transfer to tele for close monitoring.  - ESHA suspect this was prerenal secondary to dehydration, creatinine improving now at baseline,  continue IV fluids, hold home Lasix, d/c IVF  - Neuropathic pain on BLE, continue trial of gabapentin  - Suspected PMR, patient currently of decadron, continue prolonged taper  - Ecoli UTI, she s/p 3 days off Invanz  - Bilateral lower extremity swelling, s/p bilateral duplex preliminary read negative, f/u final read.  - Ongoing bereavement, suspect some depression, continue Zoloft  - Encourage to ambulate/sit on chair, PT/OT following.     DVT Prophylaxis:  [facility]    CODE STATUS:   Code Status and Medical Interventions:   Ordered at: 18     Limited Support to NOT Include:    Intubation    Dialysis    Cardioversion/Defibrillation    Artificial Nutrition     Level Of Support Discussed With:    Patient     Code Status:    No CPR     Medical Interventions (Level of Support Prior to Arrest):    Limited       Disposition: [facility], CM following.    Electronically signed by  Too Alberto MD, 09/19/18, 11:22 AM.

## 2018-09-19 NOTE — SIGNIFICANT NOTE
CODE STROKE called at 1102. Dr. Alberto at bedside.  NIH scale a 3 per 2Bicu charge RN.  Code Stroke canceled per Dr. Alberto and Pt transported to 3[address] rm 334 for telementry monitoring.

## 2018-09-20 ENCOUNTER — ANESTHESIA (OUTPATIENT)
Dept: GASTROENTEROLOGY | Facility: HOSPITAL | Age: 83
End: 2018-09-20

## 2018-09-20 ENCOUNTER — ANESTHESIA EVENT (OUTPATIENT)
Dept: GASTROENTEROLOGY | Facility: HOSPITAL | Age: 83
End: 2018-09-20

## 2018-09-20 LAB
HCT VFR BLD AUTO: 20.1 % (ref 34.5–44)
HCT VFR BLD AUTO: 22.8 % (ref 34.5–44)
HCT VFR BLD AUTO: 23.1 % (ref 34.5–44)
HCT VFR BLD AUTO: 29 % (ref 34.5–44)
HGB BLD-MCNC: 6.7 G/DL (ref 11.5–15.5)
HGB BLD-MCNC: 7.6 G/DL (ref 11.5–15.5)
HGB BLD-MCNC: 7.6 G/DL (ref 11.5–15.5)
HGB BLD-MCNC: 9.7 G/DL (ref 11.5–15.5)

## 2018-09-20 PROCEDURE — 85014 HEMATOCRIT: CPT | Performed by: INTERNAL MEDICINE

## 2018-09-20 PROCEDURE — 0W3P8ZZ CONTROL BLEEDING IN GASTROINTESTINAL TRACT, VIA NATURAL OR ARTIFICIAL OPENING ENDOSCOPIC: ICD-10-PCS | Performed by: INTERNAL MEDICINE

## 2018-09-20 PROCEDURE — 85018 HEMOGLOBIN: CPT | Performed by: INTERNAL MEDICINE

## 2018-09-20 PROCEDURE — 85018 HEMOGLOBIN: CPT | Performed by: HOSPITALIST

## 2018-09-20 PROCEDURE — 25010000002 PHENYLEPHRINE PER 1 ML: Performed by: NURSE ANESTHETIST, CERTIFIED REGISTERED

## 2018-09-20 PROCEDURE — 0DB68ZX EXCISION OF STOMACH, VIA NATURAL OR ARTIFICIAL OPENING ENDOSCOPIC, DIAGNOSTIC: ICD-10-PCS | Performed by: INTERNAL MEDICINE

## 2018-09-20 PROCEDURE — P9016 RBC LEUKOCYTES REDUCED: HCPCS

## 2018-09-20 PROCEDURE — 25010000002 FENTANYL CITRATE (PF) 100 MCG/2ML SOLUTION: Performed by: NURSE ANESTHETIST, CERTIFIED REGISTERED

## 2018-09-20 PROCEDURE — 0DB58ZX EXCISION OF ESOPHAGUS, VIA NATURAL OR ARTIFICIAL OPENING ENDOSCOPIC, DIAGNOSTIC: ICD-10-PCS | Performed by: INTERNAL MEDICINE

## 2018-09-20 PROCEDURE — 86900 BLOOD TYPING SEROLOGIC ABO: CPT

## 2018-09-20 PROCEDURE — 36430 TRANSFUSION BLD/BLD COMPNT: CPT

## 2018-09-20 PROCEDURE — 25010000002 PROPOFOL 10 MG/ML EMULSION: Performed by: NURSE ANESTHETIST, CERTIFIED REGISTERED

## 2018-09-20 PROCEDURE — 99233 SBSQ HOSP IP/OBS HIGH 50: CPT | Performed by: HOSPITALIST

## 2018-09-20 PROCEDURE — 25010000002 EPINEPHRINE PF 1 MG/10ML SOLUTION PREFILLED SYRINGE: Performed by: INTERNAL MEDICINE

## 2018-09-20 PROCEDURE — 25010000002 FLUCONAZOLE PER 200 MG: Performed by: INTERNAL MEDICINE

## 2018-09-20 PROCEDURE — 25010000002 ONDANSETRON PER 1 MG: Performed by: NURSE ANESTHETIST, CERTIFIED REGISTERED

## 2018-09-20 PROCEDURE — 85014 HEMATOCRIT: CPT | Performed by: HOSPITALIST

## 2018-09-20 PROCEDURE — 88305 TISSUE EXAM BY PATHOLOGIST: CPT | Performed by: INTERNAL MEDICINE

## 2018-09-20 RX ORDER — SODIUM CHLORIDE 9 MG/ML
50 INJECTION, SOLUTION INTRAVENOUS CONTINUOUS
Status: DISCONTINUED | OUTPATIENT
Start: 2018-09-20 | End: 2018-09-24

## 2018-09-20 RX ORDER — FENTANYL CITRATE 50 UG/ML
50 INJECTION, SOLUTION INTRAMUSCULAR; INTRAVENOUS
Status: DISCONTINUED | OUTPATIENT
Start: 2018-09-20 | End: 2018-09-20 | Stop reason: HOSPADM

## 2018-09-20 RX ORDER — SODIUM CHLORIDE 0.9 % (FLUSH) 0.9 %
1-10 SYRINGE (ML) INJECTION AS NEEDED
Status: DISCONTINUED | OUTPATIENT
Start: 2018-09-20 | End: 2018-09-20 | Stop reason: HOSPADM

## 2018-09-20 RX ORDER — LIDOCAINE HYDROCHLORIDE 10 MG/ML
INJECTION, SOLUTION INFILTRATION; PERINEURAL AS NEEDED
Status: DISCONTINUED | OUTPATIENT
Start: 2018-09-20 | End: 2018-09-20 | Stop reason: SURG

## 2018-09-20 RX ORDER — UBIDECARENONE 75 MG
100 CAPSULE ORAL DAILY
Status: DISCONTINUED | OUTPATIENT
Start: 2018-09-20 | End: 2018-09-25 | Stop reason: HOSPADM

## 2018-09-20 RX ORDER — ONDANSETRON 2 MG/ML
4 INJECTION INTRAMUSCULAR; INTRAVENOUS ONCE AS NEEDED
Status: COMPLETED | OUTPATIENT
Start: 2018-09-20 | End: 2018-09-20

## 2018-09-20 RX ORDER — FLUCONAZOLE 2 MG/ML
200 INJECTION, SOLUTION INTRAVENOUS ONCE
Status: COMPLETED | OUTPATIENT
Start: 2018-09-20 | End: 2018-09-20

## 2018-09-20 RX ORDER — LIDOCAINE HYDROCHLORIDE 10 MG/ML
0.5 INJECTION, SOLUTION EPIDURAL; INFILTRATION; INTRACAUDAL; PERINEURAL ONCE AS NEEDED
Status: DISCONTINUED | OUTPATIENT
Start: 2018-09-20 | End: 2018-09-20 | Stop reason: HOSPADM

## 2018-09-20 RX ORDER — PROPOFOL 10 MG/ML
VIAL (ML) INTRAVENOUS AS NEEDED
Status: DISCONTINUED | OUTPATIENT
Start: 2018-09-20 | End: 2018-09-20 | Stop reason: SURG

## 2018-09-20 RX ORDER — EPHEDRINE SULFATE 50 MG/ML
5 INJECTION, SOLUTION INTRAVENOUS ONCE AS NEEDED
Status: DISCONTINUED | OUTPATIENT
Start: 2018-09-20 | End: 2018-09-20 | Stop reason: HOSPADM

## 2018-09-20 RX ORDER — SODIUM CHLORIDE, SODIUM LACTATE, POTASSIUM CHLORIDE, CALCIUM CHLORIDE 600; 310; 30; 20 MG/100ML; MG/100ML; MG/100ML; MG/100ML
9 INJECTION, SOLUTION INTRAVENOUS CONTINUOUS
Status: DISCONTINUED | OUTPATIENT
Start: 2018-09-20 | End: 2018-09-20

## 2018-09-20 RX ORDER — FLUCONAZOLE, SODIUM CHLORIDE 2 MG/ML
100 INJECTION INTRAVENOUS DAILY
Status: DISCONTINUED | OUTPATIENT
Start: 2018-09-21 | End: 2018-09-22

## 2018-09-20 RX ORDER — ONDANSETRON 2 MG/ML
4 INJECTION INTRAMUSCULAR; INTRAVENOUS ONCE AS NEEDED
Status: DISCONTINUED | OUTPATIENT
Start: 2018-09-20 | End: 2018-09-20 | Stop reason: HOSPADM

## 2018-09-20 RX ADMIN — SODIUM CHLORIDE 8 MG/HR: 9 INJECTION, SOLUTION INTRAVENOUS at 19:27

## 2018-09-20 RX ADMIN — PROPOFOL 50 MG: 10 INJECTION, EMULSION INTRAVENOUS at 09:50

## 2018-09-20 RX ADMIN — SODIUM CHLORIDE 1000 ML: 9 INJECTION, SOLUTION INTRAVENOUS at 11:59

## 2018-09-20 RX ADMIN — SODIUM CHLORIDE 8 MG/HR: 9 INJECTION, SOLUTION INTRAVENOUS at 08:20

## 2018-09-20 RX ADMIN — SODIUM CHLORIDE 75 ML/HR: 9 INJECTION, SOLUTION INTRAVENOUS at 11:42

## 2018-09-20 RX ADMIN — FENTANYL CITRATE 50 MCG: 50 INJECTION, SOLUTION INTRAMUSCULAR; INTRAVENOUS at 10:50

## 2018-09-20 RX ADMIN — SODIUM CHLORIDE 100 ML/HR: 9 INJECTION, SOLUTION INTRAVENOUS at 23:00

## 2018-09-20 RX ADMIN — PHENYLEPHRINE HYDROCHLORIDE 100 MCG: 10 INJECTION INTRAVENOUS at 10:00

## 2018-09-20 RX ADMIN — ONDANSETRON 4 MG: 2 INJECTION INTRAMUSCULAR; INTRAVENOUS at 10:55

## 2018-09-20 RX ADMIN — LIDOCAINE HYDROCHLORIDE 50 MG: 10 INJECTION, SOLUTION INFILTRATION; PERINEURAL at 09:50

## 2018-09-20 RX ADMIN — PHENYLEPHRINE HYDROCHLORIDE 100 MCG: 10 INJECTION INTRAVENOUS at 09:57

## 2018-09-20 RX ADMIN — PHENYLEPHRINE HYDROCHLORIDE 100 MCG: 10 INJECTION INTRAVENOUS at 10:10

## 2018-09-20 RX ADMIN — SODIUM CHLORIDE, POTASSIUM CHLORIDE, SODIUM LACTATE AND CALCIUM CHLORIDE 9 ML/HR: 600; 310; 30; 20 INJECTION, SOLUTION INTRAVENOUS at 09:19

## 2018-09-20 RX ADMIN — PHENYLEPHRINE HYDROCHLORIDE 100 MCG: 10 INJECTION INTRAVENOUS at 09:54

## 2018-09-20 RX ADMIN — SODIUM CHLORIDE 8 MG/HR: 9 INJECTION, SOLUTION INTRAVENOUS at 13:07

## 2018-09-20 RX ADMIN — SODIUM CHLORIDE, POTASSIUM CHLORIDE, SODIUM LACTATE AND CALCIUM CHLORIDE: 600; 310; 30; 20 INJECTION, SOLUTION INTRAVENOUS at 09:49

## 2018-09-20 RX ADMIN — PHENYLEPHRINE HYDROCHLORIDE 100 MCG: 10 INJECTION INTRAVENOUS at 10:08

## 2018-09-20 RX ADMIN — PHENYLEPHRINE HYDROCHLORIDE 100 MCG: 10 INJECTION INTRAVENOUS at 10:02

## 2018-09-20 RX ADMIN — PROPOFOL 25 MG: 10 INJECTION, EMULSION INTRAVENOUS at 09:54

## 2018-09-20 RX ADMIN — SODIUM CHLORIDE 8 MG/HR: 9 INJECTION, SOLUTION INTRAVENOUS at 22:59

## 2018-09-20 RX ADMIN — PHENYLEPHRINE HYDROCHLORIDE 100 MCG: 10 INJECTION INTRAVENOUS at 09:50

## 2018-09-20 RX ADMIN — SODIUM CHLORIDE, POTASSIUM CHLORIDE, SODIUM LACTATE AND CALCIUM CHLORIDE: 600; 310; 30; 20 INJECTION, SOLUTION INTRAVENOUS at 10:05

## 2018-09-20 RX ADMIN — FENTANYL CITRATE 50 MCG: 50 INJECTION, SOLUTION INTRAMUSCULAR; INTRAVENOUS at 10:58

## 2018-09-20 RX ADMIN — SODIUM CHLORIDE 8 MG/HR: 9 INJECTION, SOLUTION INTRAVENOUS at 02:48

## 2018-09-20 RX ADMIN — FLUCONAZOLE, SODIUM CHLORIDE 200 MG: 2 INJECTION INTRAVENOUS at 14:14

## 2018-09-20 NOTE — PROGRESS NOTES
Continued Stay Note  [facility]     Patient Name: Dacia Nelson  MRN: [id number]  Today's Date: 9/20/2018    Admit Date: 9/13/2018          Discharge Plan     Row Name 09/20/18 1442       Plan    Plan [facility] vs other    Patient/Family in Agreement with Plan yes    Plan Comments Spoke with patient's daughter, Sunitha, in the room to discuss the discharge plan. Patient was sleeping. Per daughter, Dr. Bledsoe would like to watch patient through the weekend. Updated [place] with [facility] and will f/u with her on Monday, 9/24/19. CM will continue to follow patient's POC and assist with DC planning.               Discharge Codes    No documentation.       Expected Discharge Date and Time     Expected Discharge Date Expected Discharge Time    Sep 22, 2018             Melanie Alonso

## 2018-09-20 NOTE — ANESTHESIA PREPROCEDURE EVALUATION
Anesthesia Evaluation     Patient summary reviewed and Nursing notes reviewed   NPO Solid Status: > 8 hours  NPO Liquid Status: > 8 hours           Airway   Mallampati: I  TM distance: >3 FB  Neck ROM: full  No difficulty expected  Dental    (+) upper dentures    Pulmonary     breath sounds clear to auscultation  Cardiovascular     ECG reviewed  Rhythm: regular  Rate: normal    (+) hypertension,       Neuro/Psych  GI/Hepatic/Renal/Endo    (+)  GI bleeding,     Musculoskeletal     Abdominal    Substance History      OB/GYN          Other   (+) arthritis                     Anesthesia Plan    ASA 3     general   total IV anesthesia  intravenous induction   Anesthetic plan, all risks, benefits, and alternatives have been provided, discussed and informed consent has been obtained with: patient and child.    Plan discussed with CRNA.

## 2018-09-20 NOTE — PLAN OF CARE
Problem: Patient Care Overview  Goal: Plan of Care Review  Outcome: Ongoing (interventions implemented as appropriate)   09/20/18 1943   Plan of Care Review   Progress no change   OTHER   Outcome Summary Patient returned from endoscopy lab with low BP. BP remains low despite IV boluses and blood. Dr. Stewart aware. Had received fentanyl prior to transfer from endoscopy. Denies pain. Daughter at bedside most of the day. Very attentive to patient. Wishes to be called if any issues throughout night.   Coping/Psychosocial   Plan of Care Reviewed With patient;daughter       Problem: Fall Risk (Adult)  Goal: Absence of Fall  Outcome: Ongoing (interventions implemented as appropriate)      Problem: Skin Injury Risk (Adult)  Goal: Skin Health and Integrity  Outcome: Ongoing (interventions implemented as appropriate)

## 2018-09-20 NOTE — PROGRESS NOTES
[facility] Medicine Services  PROGRESS NOTE    Patient Name: Dacia Nelson  : 1925  MRN: [id number]    Date of Admission: 2018  Length of Stay: 7  Primary Care Physician: Momo Breaux PA    Subjective   Subjective     CC:  Generalized weakness    HPI:  Had EGD today with Dr. Bledsoe for melena.  Ulcer with clot and visible vessel.  Injected and clip.  2 units ordered after procedure today due to low hemoglobin and low blood pressure.  No bleeding reported.    Review of Systems    Gen- No fevers, chills  CV- No chest pain, palpitations  Resp- No cough, dyspnea  GI- No N/V/D, abd pain    Otherwise ROS is negative except as mentioned in the HPI.    Objective   Objective     Vital Signs:   Temp:  [96.8 °F (36 °C)-98.5 °F (36.9 °C)] 97.4 °F (36.3 °C)  Heart Rate:  [60-83] 70  Resp:  [12-19] 16  BP: ()/(33-67) 88/59  Total (NIH Stroke Scale): 3     Physical Exam:    Constitutional: No acute distress, awake, alert  HENT: NCAT, dry tongue  Respiratory: Clear to auscultation bilaterally, respiratory effort normal   Cardiovascular: RRR, s1 and s2  Gastrointestinal: Positive bowel sounds, soft, nontender, nondistended  Musculoskeletal: No bilateral ankle edema  Psychiatric: Appropriate affect, cooperative  Neurologic: Oriented x 3, strength symmetric in all extremities, Cranial Nerves grossly intact to confrontation, speech clear  Skin: dry skin    Results Reviewed:  I have personally reviewed current lab, radiology, and data and agree.      Results from last 7 days  Lab Units 18  1541 18  1215 18  0726  18  1112 09/15/18  0520 18  0606   WBC 10*3/mm3  --   --   --   --  11.96* 5.63 3.29*   HEMOGLOBIN g/dL 7.6* 7.6* 9.7*  < > 9.3* 9.9* 10.4*   HEMATOCRIT % 22.8* 23.1* 29.0*  < > 27.8* 29.9* 30.6*   PLATELETS 10*3/mm3  --   --   --   --  242 164 149*   < > = values in this interval not displayed.    Results from last 7 days  Lab Units  09/19/18  1112 09/19/18  0450 09/17/18  0531   SODIUM mmol/L 135 138 136   POTASSIUM mmol/L 5.0 4.6 4.4   CHLORIDE mmol/L 107 108 108   CO2 mmol/L 22.0 23.0 23.0   BUN mg/dL 31* 28* 26*   CREATININE mg/dL 1.28 1.17 1.25   GLUCOSE mg/dL 152* 87 108*   CALCIUM mg/dL 7.6* 7.7* 7.6*   ALT (SGPT) U/L 60*  --   --    AST (SGOT) U/L 60*  --   --      Estimated Creatinine Clearance: 25.7 mL/min (by C-G formula based on SCr of 1.28 mg/dL).  No results found for: BNP    Microbiology Results Abnormal     Procedure Component Value - Date/Time    Urine Culture - Urine, [[id number]]  (Abnormal)  (Susceptibility) Collected:  09/13/18 1813    Lab Status:  Final result Specimen:  Urine from Urine, Clean Catch Updated:  09/15/18 0928     Urine Culture >100,000 CFU/mL Escherichia coli (A)    Susceptibility      Escherichia coli     RICO     Amikacin <=16 ug/ml Susceptible     Ampicillin >16 ug/ml Resistant     Ampicillin + Sulbactam 16/8 ug/ml Intermediate     Aztreonam <=8 ug/ml Susceptible     Cefepime <=8 ug/ml Susceptible     Cefotaxime <=2 ug/ml Susceptible     Ceftriaxone <=8 ug/ml Susceptible     Cefuroxime sodium <=4 ug/ml Susceptible     Cephalothin 16 ug/ml Intermediate     Ertapenem <=1 ug/ml Susceptible     Gentamicin >8 ug/ml Resistant     Levofloxacin <=2 ug/ml Susceptible     Meropenem <=1 ug/ml Susceptible     Nitrofurantoin <=32 ug/ml Susceptible     Piperacillin + Tazobactam <=16 ug/ml Susceptible     Tetracycline >8 ug/ml Resistant     Tobramycin 8 ug/ml Intermediate     Trimethoprim + Sulfamethoxazole >2/38 ug/ml Resistant                        Imaging Results (last 24 hours)     ** No results found for the last 24 hours. **        I have reviewed the medications.      dexamethasone 4 mg Oral Q8H   Followed by      [START ON 9/22/2018] dexamethasone 4 mg Oral Q12H   Followed by      [START ON 9/25/2018] dexamethasone 4 mg Oral Daily With Breakfast   Followed by      [START ON 9/28/2018] dexamethasone 2 mg Oral Daily  With Breakfast   [START ON 9/21/2018] fluconazole 100 mg Intravenous Daily   gabapentin 100 mg Oral Nightly   multivitamin with minerals 1 tablet Oral Daily   potassium chloride 10 mEq Oral Daily   sertraline 50 mg Oral Daily   cyancobalamin 100 mcg Oral Daily         Assessment/Plan   Assessment / Plan     Hospital Problem List     Essential hypertension    Irritable bowel syndrome with both constipation and diarrhea    FTT (failure to thrive) in adult    Bereavement    Anemia    Chronic venous stasis dermatitis of both lower extremities    Upper GI bleed    Overview Signed 9/19/2018  4:35 PM by Nayeli Rivas PA     Added automatically from request for surgery [id number]             Brief Hospital Course to date:  Dacia Nelson is a 89 y.o. female admitted with weakness and was planned for rehab who had a bloody BM yesterday, was moved to Tele yesterday and was scoped today by Dr. Bledsoe.    Assessment & Plan:    Generalized Weakness  - multifactorial problem  Abnormal Duration of Sleep  - 18 hrs per day - ? Apnea  Acute Renal Failure  - improved  - appears to be acute on chronic renal failure  Adult FTT  Diarrhea  - ? Gi blood loss  E coli UTI  - s/p Invanz  Syncope / Large Bloody Bowel Movement  - s/p EGD with intervention today  - 2 gm drop in hemoglobin post procedure  - got 2 units this afternoon  - low threshold for ICU upgrade  - discussed case with Dr. Bledsoe today  B12 Deficiency    Hemoglobin after procedure then again 4 hours later - stable    Discussed case with Dr. Bledsoe after EGD  Blood Transfusion 2 units after EGD today  IV fluids too    Low threshold for ICU upgrade    DVT Prophylaxis: SCDs    CODE STATUS:   Code Status and Medical Interventions:   Ordered at: 09/13/18 2001     Limited Support to NOT Include:    Intubation    Dialysis    Cardioversion/Defibrillation    Artificial Nutrition     Level Of Support Discussed With:    Patient     Code Status:    No CPR     Medical Interventions  (Level of Support Prior to Arrest):    Limited     Disposition: I expect the patient to be discharged    Electronically signed by Polo Stewart MD, 09/20/18, 5:23 PM.

## 2018-09-20 NOTE — POST-PROCEDURE NOTE
EGD-  12 ulcer posterior wall with clot and visible vessel.  Epi injected clip to vessel and Hemospray.   ESO has yellow plaque consistent with Candida      REC FU bx  Repeat EGD if rebleeds.  Diflucan

## 2018-09-20 NOTE — ANESTHESIA POSTPROCEDURE EVALUATION
Patient: Dacia Nelson    Procedure Summary     Date:  09/20/18 Room / Location:   [facility]    Anesthesia Start:  0949 Anesthesia Stop:  1023    Procedure:  ESOPHAGOGASTRODUODENOSCOPY (N/A ) Diagnosis:       Upper GI bleed      (Upper GI bleed [K92.2])    Surgeon:  Elio Bledsoe MD Provider:  Polo Grider MD    Anesthesia Type:  general ASA Status:  3          Anesthesia Type: general  Last vitals/  /50   Temp   97.0   Pulse   71   Resp   16   SpO2   100     Post Anesthesia Care and Evaluation    Patient location during evaluation: PACU  Patient participation: complete - patient participated  Level of consciousness: awake and alert  Pain score: 0  Pain management: adequate  Airway patency: patent  Anesthetic complications: No anesthetic complications  PONV Status: none  Cardiovascular status: hemodynamically stable and acceptable  Respiratory status: nonlabored ventilation, acceptable and nasal cannula  Hydration status: acceptable

## 2018-09-20 NOTE — PROGRESS NOTES
Adult Nutrition  Assessment/PES    Patient Name:  Dacia Nelson  YOB: 1925  MRN: [id number]  Admit Date:  9/13/2018    Assessment Date:  9/20/2018              Reason for Assessment     Row Name 09/20/18 1428          Reason for Assessment    Reason For Assessment follow-up protocol; 20 mins     Diagnosis --   per EGD: ulcer posterior wall with clot and yellow plaque c/w candida; UTI; Macrocytic anemia; and B12 deficiency. Complete list per notes this adm.                Nutrition Prescription Ordered     Row Name 09/20/18 1433 09/20/18 1431       Nutrition Prescription PO    Current PO Diet --   pt NPO today for procedure, but has been on cardiac diet with carnation instant breakfast supplements 2x/d prior to 9/19  --    Supplement  -- --   pt not able to give RD any info about whether she has been drinking supplements.     Supplement Frequency  -- --    Common Modifiers  -- --                PO Evaluation    Number of Meals 6   at past 6 meals since 9/16.    --    % PO Intake 17  --                Problem/Interventions:        Problem 1     Row Name 09/20/18 1438          Nutrition Diagnoses Problem 1    Problem 1 Inadequate Nutrient Intake     Etiology (related to) MNT for Treatment/Condition     Signs/Symptoms (evidenced by) PO Intake     Percent (%) intake recorded 17 %     Over number of meals 6                     Intervention Goal     Row Name 09/20/18 1438          Intervention Goal    PO Advance diet             Nutrition Intervention     Row Name 09/20/18 1438          Nutrition Intervention    RD/Tech Action Follow Tx progress               Education/Evaluation     Row Name 09/20/18 1439          Monitor/Evaluation    Monitor Per protocol         Electronically signed by:  Carly Wang MS,RD,LD  09/20/18 2:39 PM

## 2018-09-21 LAB
ABO + RH BLD: NORMAL
ANION GAP SERPL CALCULATED.3IONS-SCNC: 3 MMOL/L (ref 3–11)
BH BB BLOOD EXPIRATION DATE: NORMAL
BH BB BLOOD TYPE BARCODE: 7300
BH BB DISPENSE STATUS: NORMAL
BH BB PRODUCT CODE: NORMAL
BH BB UNIT NUMBER: NORMAL
BUN BLD-MCNC: 39 MG/DL (ref 9–23)
BUN/CREAT SERPL: 31.5 (ref 7–25)
CALCIUM SPEC-SCNC: 6.7 MG/DL (ref 8.7–10.4)
CHLORIDE SERPL-SCNC: 116 MMOL/L (ref 99–109)
CO2 SERPL-SCNC: 20 MMOL/L (ref 20–31)
CREAT BLD-MCNC: 1.24 MG/DL (ref 0.6–1.3)
GFR SERPL CREATININE-BSD FRML MDRD: 40 ML/MIN/1.73
GLUCOSE BLD-MCNC: 79 MG/DL (ref 70–100)
HCT VFR BLD AUTO: 27.4 % (ref 34.5–44)
HCT VFR BLD AUTO: 28.4 % (ref 34.5–44)
HCT VFR BLD AUTO: 29.5 % (ref 34.5–44)
HCT VFR BLD AUTO: 29.7 % (ref 34.5–44)
HCT VFR BLD AUTO: 31.4 % (ref 34.5–44)
HGB BLD-MCNC: 10.4 G/DL (ref 11.5–15.5)
HGB BLD-MCNC: 9.3 G/DL (ref 11.5–15.5)
HGB BLD-MCNC: 9.6 G/DL (ref 11.5–15.5)
HGB BLD-MCNC: 9.8 G/DL (ref 11.5–15.5)
HGB BLD-MCNC: 9.8 G/DL (ref 11.5–15.5)
POTASSIUM BLD-SCNC: 4.4 MMOL/L (ref 3.5–5.5)
SODIUM BLD-SCNC: 139 MMOL/L (ref 132–146)
UNIT  ABO: NORMAL
UNIT  RH: NORMAL

## 2018-09-21 PROCEDURE — 85018 HEMOGLOBIN: CPT | Performed by: INTERNAL MEDICINE

## 2018-09-21 PROCEDURE — 85014 HEMATOCRIT: CPT | Performed by: INTERNAL MEDICINE

## 2018-09-21 PROCEDURE — 99232 SBSQ HOSP IP/OBS MODERATE 35: CPT | Performed by: PHYSICIAN ASSISTANT

## 2018-09-21 PROCEDURE — 63710000001 DEXAMETHASONE PER 0.25 MG: Performed by: INTERNAL MEDICINE

## 2018-09-21 PROCEDURE — 99233 SBSQ HOSP IP/OBS HIGH 50: CPT | Performed by: INTERNAL MEDICINE

## 2018-09-21 PROCEDURE — 25010000002 FLUCONAZOLE 100-0.9 MG/50ML-% SOLUTION: Performed by: INTERNAL MEDICINE

## 2018-09-21 PROCEDURE — 80048 BASIC METABOLIC PNL TOTAL CA: CPT | Performed by: INTERNAL MEDICINE

## 2018-09-21 RX ADMIN — POTASSIUM CHLORIDE 10 MEQ: 750 CAPSULE, EXTENDED RELEASE ORAL at 09:00

## 2018-09-21 RX ADMIN — SODIUM CHLORIDE 40 MG: 9 INJECTION, SOLUTION INTRAVENOUS at 20:27

## 2018-09-21 RX ADMIN — SODIUM CHLORIDE 50 ML/HR: 9 INJECTION, SOLUTION INTRAVENOUS at 20:26

## 2018-09-21 RX ADMIN — MULTIPLE VITAMINS W/ MINERALS TAB 1 TABLET: TAB ORAL at 09:00

## 2018-09-21 RX ADMIN — DEXAMETHASONE 4 MG: 4 TABLET ORAL at 15:16

## 2018-09-21 RX ADMIN — VITAM B12 100 MCG: 100 TAB at 09:01

## 2018-09-21 RX ADMIN — GABAPENTIN 100 MG: 100 CAPSULE ORAL at 20:23

## 2018-09-21 RX ADMIN — SERTRALINE HYDROCHLORIDE 50 MG: 50 TABLET ORAL at 09:01

## 2018-09-21 RX ADMIN — DEXAMETHASONE 4 MG: 4 TABLET ORAL at 20:29

## 2018-09-21 RX ADMIN — FLUCONAZOLE 100 MG: 2 INJECTION INTRAVENOUS at 09:06

## 2018-09-21 NOTE — PLAN OF CARE
Problem: Patient Care Overview  Goal: Plan of Care Review  Outcome: Ongoing (interventions implemented as appropriate)   09/21/18 0340   Plan of Care Review   Progress no change   OTHER   Outcome Summary Pt's BP slowly improved after 2nd unit of PRBC, H&H also improved after transfusion, no s/s of bleeding noted overnight.   Coping/Psychosocial   Plan of Care Reviewed With patient

## 2018-09-21 NOTE — PROGRESS NOTES
"GI Daily Progress Note  Subjective:    Chief Complaint:  Large bleeding duodenal ulcer     Patient denies any bowel movement after EGD yesterday.   No nausea nor vomiting/hematemesis.  Noted to be hypotensive through the night but improving.   Last /58.   She denies dizziness or lightheadedness but has not been out of bed.    S/p total 4 units PRBCs.      Objective:    /58 (BP Location: Right arm, Patient Position: Lying)   Pulse 69   Temp 97.6 °F (36.4 °C) (Oral)   Resp 13   Ht 157.5 cm (62\")   Wt 70.1 kg (154 lb 8 oz)   SpO2 97%   BMI 28.26 kg/m²     Physical Exam   Constitutional: She appears well-developed. No distress.   HENT:   Head: Normocephalic and atraumatic.   Cardiovascular: Normal rate and regular rhythm.    Pulmonary/Chest: Effort normal. No respiratory distress.   Abdominal: Soft. Bowel sounds are normal. She exhibits no distension. There is no tenderness.   Skin: Skin is warm and dry. She is not diaphoretic.   Psychiatric: Her behavior is normal.       Lab  Lab Results   Component Value Date    WBC 11.96 (H) 09/19/2018    HGB 9.8 (L) 09/21/2018    HGB 10.4 (L) 09/20/2018    HGB 7.6 (L) 09/20/2018    .5 (H) 09/19/2018     09/19/2018       Lab Results   Component Value Date    GLUCOSE 152 (H) 09/19/2018    BUN 31 (H) 09/19/2018    CREATININE 1.28 09/19/2018    EGFRIFNONA 39 (L) 09/19/2018    BCR 24.2 09/19/2018    CO2 22.0 09/19/2018    CALCIUM 7.6 (L) 09/19/2018    ALBUMIN 2.35 (L) 09/19/2018    ALKPHOS 87 09/19/2018    BILITOT 0.4 09/19/2018    ALT 60 (H) 09/19/2018    AST 60 (H) 09/19/2018       Assessment:    Duodenal ulcer, large, with bleeding, s/p endo clip and hemospray  Esophageal candidiasis   Acute blood loss anemia, stable   Syncope     Plan:    >>> Start clear liquid diet and clear BOOST TID   >>> Continue to monitor H&H   >>> Change IV Protonix drip to BID PPI   >>> Await pathology     If signs of recurrent bleeding, will need repeat EGD     Nayeli Rivas, " PA  09/21/18  8:53 AM

## 2018-09-21 NOTE — SIGNIFICANT NOTE
Called and talked with LEXI Dugan:  Pt with few crackles noted in RLL, pt asymptomatic, breathing unlabored, pt with no complaints.  This is noted after 2nd unit of PRBC.  Per report pt has had low BP after endoscopy and received fluid boluses and blood today.  APRN orders to decrease IVF to 50 mL/hr and cont to monitor pt.  Will cont POC and cont to closely monitor pt.

## 2018-09-21 NOTE — PLAN OF CARE
Problem: Patient Care Overview  Goal: Plan of Care Review  Outcome: Ongoing (interventions implemented as appropriate)   09/21/18 4254   Plan of Care Review   Progress improving   OTHER   Outcome Summary VSS this shift. No c/o pain or discomfort made to this nurse. Daughter at bedside most of shift. Incont vs cont. Purewick in place.    Coping/Psychosocial   Plan of Care Reviewed With patient;daughter     Goal: Individualization and Mutuality  Outcome: Ongoing (interventions implemented as appropriate)    Goal: Discharge Needs Assessment  Outcome: Ongoing (interventions implemented as appropriate)    Goal: Interprofessional Rounds/Family Conf  Outcome: Ongoing (interventions implemented as appropriate)

## 2018-09-21 NOTE — PROGRESS NOTES
[facility] Medicine Services  PROGRESS NOTE    Patient Name: Dacia Nelson  : 1925  MRN: [id number]    Date of Admission: 2018  Length of Stay: 8  Primary Care Physician: Momo Breaux PA    Subjective   Subjective     CC:  Generalized weakness    HPI:  No brbpr or melena. No n/v/d    Review of Systems   HENT: Negative for postnasal drip.    Cardiovascular: Negative for leg swelling.   Genitourinary: Negative for decreased urine volume.   Hematological: Negative for adenopathy.       Gen- No fevers, chills  CV- No chest pain, palpitations  Resp- No cough, dyspnea  GI- No N/V/D, abd pain    Otherwise ROS is negative except as mentioned in the HPI.    Objective   Objective     Vital Signs:   Temp:  [97.2 °F (36.2 °C)-98 °F (36.7 °C)] 98 °F (36.7 °C)  Heart Rate:  [62-78] 71  Resp:  [13-16] 16  BP: ()/(38-63) 113/41  Total (NIH Stroke Scale): 3     Physical Exam:    Constitutional: No acute distress, awake, alert  HENT: NCAT, dry tongue  Respiratory: Clear to auscultation bilaterally, respiratory effort normal   Cardiovascular: RRR, s1 and s2  Gastrointestinal: Positive bowel sounds, soft, nontender, nondistended  Musculoskeletal: No bilateral ankle edema  Psychiatric: Appropriate affect, cooperative  Neurologic: Oriented x 3, strength symmetric in all extremities, Cranial Nerves grossly intact to confrontation, speech clear  Skin: dry skin    Results Reviewed:  I have personally reviewed current lab, radiology, and data and agree.      Results from last 7 days  Lab Units 18  0740 18  2339 18  1541  18  1112 09/15/18  0520   WBC 10*3/mm3  --   --   --   --  11.96* 5.63   HEMOGLOBIN g/dL 9.8* 10.4* 7.6*  < > 9.3* 9.9*   HEMATOCRIT % 29.5* 31.4* 22.8*  < > 27.8* 29.9*   PLATELETS 10*3/mm3  --   --   --   --  242 164   < > = values in this interval not displayed.    Results from last 7 days  Lab Units 18  0740 18  7505  09/19/18  0450   SODIUM mmol/L 139 135 138   POTASSIUM mmol/L 4.4 5.0 4.6   CHLORIDE mmol/L 116* 107 108   CO2 mmol/L 20.0 22.0 23.0   BUN mg/dL 39* 31* 28*   CREATININE mg/dL 1.24 1.28 1.17   GLUCOSE mg/dL 79 152* 87   CALCIUM mg/dL 6.7* 7.6* 7.7*   ALT (SGPT) U/L  --  60*  --    AST (SGOT) U/L  --  60*  --      Estimated Creatinine Clearance: 26.6 mL/min (by C-G formula based on SCr of 1.24 mg/dL).  No results found for: BNP    Microbiology Results Abnormal     Procedure Component Value - Date/Time    Urine Culture - Urine, [[id number]]  (Abnormal)  (Susceptibility) Collected:  09/13/18 1813    Lab Status:  Final result Specimen:  Urine from Urine, Clean Catch Updated:  09/15/18 0928     Urine Culture >100,000 CFU/mL Escherichia coli (A)    Susceptibility      Escherichia coli     RICO     Amikacin <=16 ug/ml Susceptible     Ampicillin >16 ug/ml Resistant     Ampicillin + Sulbactam 16/8 ug/ml Intermediate     Aztreonam <=8 ug/ml Susceptible     Cefepime <=8 ug/ml Susceptible     Cefotaxime <=2 ug/ml Susceptible     Ceftriaxone <=8 ug/ml Susceptible     Cefuroxime sodium <=4 ug/ml Susceptible     Cephalothin 16 ug/ml Intermediate     Ertapenem <=1 ug/ml Susceptible     Gentamicin >8 ug/ml Resistant     Levofloxacin <=2 ug/ml Susceptible     Meropenem <=1 ug/ml Susceptible     Nitrofurantoin <=32 ug/ml Susceptible     Piperacillin + Tazobactam <=16 ug/ml Susceptible     Tetracycline >8 ug/ml Resistant     Tobramycin 8 ug/ml Intermediate     Trimethoprim + Sulfamethoxazole >2/38 ug/ml Resistant                        Imaging Results (last 24 hours)     ** No results found for the last 24 hours. **        I have reviewed the medications.      dexamethasone 4 mg Oral Q8H   Followed by      [START ON 9/22/2018] dexamethasone 4 mg Oral Q12H   Followed by      [START ON 9/25/2018] dexamethasone 4 mg Oral Daily With Breakfast   Followed by      [START ON 9/28/2018] dexamethasone 2 mg Oral Daily With Breakfast   fluconazole  100 mg Intravenous Daily   gabapentin 100 mg Oral Nightly   multivitamin with minerals 1 tablet Oral Daily   pantoprazole (PROTONIX) IVPB 40 mg Intravenous BID   potassium chloride 10 mEq Oral Daily   sertraline 50 mg Oral Daily   cyancobalamin 100 mcg Oral Daily         Assessment/Plan   Assessment / Plan     Hospital Problem List     Essential hypertension    Irritable bowel syndrome with both constipation and diarrhea    FTT (failure to thrive) in adult    Bereavement    Anemia    Chronic venous stasis dermatitis of both lower extremities    Upper GI bleed    Overview Signed 9/19/2018  4:35 PM by Nayeli Rivas PA     Added automatically from request for surgery [id number]             Brief Hospital Course to date:  Dacia Nelson is a 89 y.o. female admitted with weakness and was planned for rehab who had a bloody BM yesterday, was moved to Tele yesterday and was scoped today by Dr. Bledsoe.    Assessment & Plan:    Acute upper GI bleed w/ symptomatic blood loss anemia  Duodenal ulcer    S/p egd: large duodenal ulcer s/p endo clip and hemospray  Generalized Weakness  - multifactorial problem  Abnormal Duration of Sleep  - 18 hrs per day - ? Apnea  Acute Renal Failure  - improved  - appears to be acute on chronic renal failure  Adult FTT  Diarrhea  - ? Gi blood loss  S/p E coli UTI  - s/p Invanz  Syncope / Large Bloody Bowel Movement  - s/p EGD with intervention today  - 2 gm drop in hemoglobin post procedure  - got 2 units this afternoon  - low threshold for ICU upgrade  - discussed case with Dr. Bledsoe today  B12 Deficiency  ----------------------------  Plan:  -no overt gi bleeding  -transfuse prn w/ q12h h&h(s/p 2 unit prbc 9/19, then 2 units 9/20)  -bid ppi  -diflcan  -clears for now  -f/u egd pathology  -if rebleeds, would need repeat egd    -q12h H&H, bmp in a.m.      *here over the weekend to ensure hgb stability (to [facility] tentatively if bed still available then per 9/21/18 case management  note)      DVT Prophylaxis: SCDs    CODE STATUS:   Code Status and Medical Interventions:   Ordered at: 09/13/18 2001     Limited Support to NOT Include:    Intubation    Dialysis    Cardioversion/Defibrillation    Artificial Nutrition     Level Of Support Discussed With:    Patient     Code Status:    No CPR     Medical Interventions (Level of Support Prior to Arrest):    Limited     Disposition: I expect the patient to be discharged    Electronically signed by Igor Fairbanks MD, 09/21/18, 4:08 PM.

## 2018-09-21 NOTE — PROGRESS NOTES
Continued Stay Note  [facility]     Patient Name: Dacia Nelson  MRN: [id number]  Today's Date: 9/21/2018    Admit Date: 9/13/2018          Discharge Plan     Row Name 09/21/18 1141       Plan    Plan [facility] vs other    Patient/Family in Agreement with Plan yes    Plan Comments Per Dr. Fairbanks in rounds, patient is not medically ready for discharge and plan is to watch her over the weekend. Tentative plan is to discharge to [facility] if a bed is still available, but daughter is considering other options, depending on patient's progress/POC. CM will continue to follow.               Discharge Codes    No documentation.       Expected Discharge Date and Time     Expected Discharge Date Expected Discharge Time    Sep 25, 2018             Melanie Alonso

## 2018-09-22 PROBLEM — M35.3 PMR (POLYMYALGIA RHEUMATICA) (HCC): Status: ACTIVE | Noted: 2018-09-22

## 2018-09-22 PROBLEM — E53.8 B12 DEFICIENCY: Status: ACTIVE | Noted: 2018-09-22

## 2018-09-22 PROBLEM — M79.10 MYALGIA: Status: ACTIVE | Noted: 2018-09-22

## 2018-09-22 PROBLEM — K26.9 DUODENAL ULCER: Status: ACTIVE | Noted: 2018-09-22

## 2018-09-22 LAB
ANION GAP SERPL CALCULATED.3IONS-SCNC: 10 MMOL/L (ref 3–11)
BUN BLD-MCNC: 33 MG/DL (ref 9–23)
BUN/CREAT SERPL: 27.7 (ref 7–25)
CALCIUM SPEC-SCNC: 7.4 MG/DL (ref 8.7–10.4)
CHLORIDE SERPL-SCNC: 114 MMOL/L (ref 99–109)
CO2 SERPL-SCNC: 19 MMOL/L (ref 20–31)
CREAT BLD-MCNC: 1.19 MG/DL (ref 0.6–1.3)
CYTO UR: NORMAL
GFR SERPL CREATININE-BSD FRML MDRD: 42 ML/MIN/1.73
GLUCOSE BLD-MCNC: 84 MG/DL (ref 70–100)
HCT VFR BLD AUTO: 28.3 % (ref 34.5–44)
HGB BLD-MCNC: 9.5 G/DL (ref 11.5–15.5)
LAB AP CASE REPORT: NORMAL
LAB AP CLINICAL INFORMATION: NORMAL
PATH REPORT.FINAL DX SPEC: NORMAL
PATH REPORT.GROSS SPEC: NORMAL
POTASSIUM BLD-SCNC: 4.3 MMOL/L (ref 3.5–5.5)
SODIUM BLD-SCNC: 143 MMOL/L (ref 132–146)

## 2018-09-22 PROCEDURE — 63710000001 DEXAMETHASONE PER 0.25 MG: Performed by: INTERNAL MEDICINE

## 2018-09-22 PROCEDURE — 85018 HEMOGLOBIN: CPT | Performed by: INTERNAL MEDICINE

## 2018-09-22 PROCEDURE — 25010000002 FLUCONAZOLE 100-0.9 MG/50ML-% SOLUTION: Performed by: INTERNAL MEDICINE

## 2018-09-22 PROCEDURE — 85014 HEMATOCRIT: CPT | Performed by: INTERNAL MEDICINE

## 2018-09-22 PROCEDURE — 80048 BASIC METABOLIC PNL TOTAL CA: CPT | Performed by: INTERNAL MEDICINE

## 2018-09-22 PROCEDURE — 99233 SBSQ HOSP IP/OBS HIGH 50: CPT | Performed by: INTERNAL MEDICINE

## 2018-09-22 PROCEDURE — 99232 SBSQ HOSP IP/OBS MODERATE 35: CPT | Performed by: INTERNAL MEDICINE

## 2018-09-22 RX ORDER — FLUCONAZOLE 100 MG/1
100 TABLET ORAL ONCE
Status: DISCONTINUED | OUTPATIENT
Start: 2018-09-23 | End: 2018-09-22

## 2018-09-22 RX ORDER — SODIUM CHLORIDE 9 MG/ML
50 INJECTION, SOLUTION INTRAVENOUS CONTINUOUS
Status: ACTIVE | OUTPATIENT
Start: 2018-09-22 | End: 2018-09-23

## 2018-09-22 RX ORDER — PANTOPRAZOLE SODIUM 40 MG/1
40 TABLET, DELAYED RELEASE ORAL
Status: DISCONTINUED | OUTPATIENT
Start: 2018-09-22 | End: 2018-09-25 | Stop reason: HOSPADM

## 2018-09-22 RX ORDER — FLUCONAZOLE 100 MG/1
100 TABLET ORAL DAILY
Status: DISCONTINUED | OUTPATIENT
Start: 2018-09-23 | End: 2018-09-25 | Stop reason: HOSPADM

## 2018-09-22 RX ADMIN — PANTOPRAZOLE SODIUM 40 MG: 40 TABLET, DELAYED RELEASE ORAL at 17:40

## 2018-09-22 RX ADMIN — SODIUM CHLORIDE 50 ML/HR: 9 INJECTION, SOLUTION INTRAVENOUS at 17:39

## 2018-09-22 RX ADMIN — FLUCONAZOLE 100 MG: 2 INJECTION INTRAVENOUS at 09:00

## 2018-09-22 RX ADMIN — SERTRALINE HYDROCHLORIDE 50 MG: 50 TABLET ORAL at 08:57

## 2018-09-22 RX ADMIN — SODIUM CHLORIDE 40 MG: 9 INJECTION, SOLUTION INTRAVENOUS at 08:41

## 2018-09-22 RX ADMIN — MULTIPLE VITAMINS W/ MINERALS TAB 1 TABLET: TAB ORAL at 08:57

## 2018-09-22 RX ADMIN — POTASSIUM CHLORIDE 10 MEQ: 750 CAPSULE, EXTENDED RELEASE ORAL at 08:58

## 2018-09-22 RX ADMIN — DEXAMETHASONE 4 MG: 4 TABLET ORAL at 08:58

## 2018-09-22 RX ADMIN — ONDANSETRON 4 MG: 4 TABLET, FILM COATED ORAL at 11:04

## 2018-09-22 RX ADMIN — GABAPENTIN 100 MG: 100 CAPSULE ORAL at 22:01

## 2018-09-22 RX ADMIN — VITAM B12 100 MCG: 100 TAB at 08:57

## 2018-09-22 NOTE — PLAN OF CARE
Problem: Patient Care Overview  Goal: Plan of Care Review   09/22/18 5000   Plan of Care Review   Progress improving   OTHER   Outcome Summary No new complaints. No further evidence of GIB since GI intervention. Sleeping through majority of day, understood to be baseline for patient. Intermittent nausea, successfully treated with PO zofran. Coccyx still fissured, tender, z guard and butterfly mepilex applied. VSS, no new changes.    Coping/Psychosocial   Plan of Care Reviewed With patient     Goal: Individualization and Mutuality  Outcome: Ongoing (interventions implemented as appropriate)    Goal: Discharge Needs Assessment  Outcome: Ongoing (interventions implemented as appropriate)    Goal: Interprofessional Rounds/Family Conf  Outcome: Ongoing (interventions implemented as appropriate)      Problem: Fall Risk (Adult)  Goal: Absence of Fall  Outcome: Ongoing (interventions implemented as appropriate)      Problem: Skin Injury Risk (Adult)  Goal: Skin Health and Integrity  Outcome: Ongoing (interventions implemented as appropriate)      Problem: Gastrointestinal Bleeding (Adult)  Goal: Signs and Symptoms of Listed Potential Problems Will be Absent, Minimized or Managed (Gastrointestinal Bleeding)  Outcome: Ongoing (interventions implemented as appropriate)

## 2018-09-22 NOTE — PLAN OF CARE
Problem: Patient Care Overview  Goal: Plan of Care Review  Outcome: Ongoing (interventions implemented as appropriate)   09/22/18 0300   Plan of Care Review   Progress improving   OTHER   Outcome Summary PT appeared to rest well. VSS. NO new complaints. Watching Hgb through weekend and then tx to [place].    Coping/Psychosocial   Plan of Care Reviewed With patient     Goal: Individualization and Mutuality  Outcome: Ongoing (interventions implemented as appropriate)    Goal: Discharge Needs Assessment  Outcome: Ongoing (interventions implemented as appropriate)    Goal: Interprofessional Rounds/Family Conf  Outcome: Ongoing (interventions implemented as appropriate)      Problem: Fall Risk (Adult)  Goal: Absence of Fall  Outcome: Ongoing (interventions implemented as appropriate)      Problem: Skin Injury Risk (Adult)  Goal: Skin Health and Integrity  Outcome: Ongoing (interventions implemented as appropriate)      Problem: Gastrointestinal Bleeding (Adult)  Goal: Signs and Symptoms of Listed Potential Problems Will be Absent, Minimized or Managed (Gastrointestinal Bleeding)  Outcome: Ongoing (interventions implemented as appropriate)

## 2018-09-22 NOTE — PROGRESS NOTES
"GI Daily Progress Note  Subjective     Lyndsey Amaya is a 89 y.o. female who was admitted with Acute renal failure (CMS/[facility]).   And UGI bleed due to DU. Stools now brown.  No abd pain.  Had some dyspepsia after Ensure.    Chief Complaint:  BRPPR    Objective     /55 (BP Location: Left arm, Patient Position: Lying)   Pulse 73   Temp 98.2 °F (36.8 °C) (Oral)   Resp 16   Ht 157.5 cm (62\")   Wt 70.1 kg (154 lb 8 oz)   SpO2 92%   BMI 28.26 kg/m²     Intake/Output last 3 shifts:  I/O last 3 completed shifts:  In: 3053.1 [P.O.:480; I.V.:1847; Blood:726.1]  Out: 600 [Urine:600]  Intake/Output this shift:  No intake/output data recorded.      Physical Exam  Wt Readings from Last 3 Encounters:   09/20/18 70.1 kg (154 lb 8 oz)   08/21/18 68 kg (150 lb)   08/13/18 68 kg (150 lb)   ,body mass index is 28.26 kg/m².,@FLOWAMB(6)@,@FLOWAMB(5)@,@FLOWAMB(8)@   CONSTITUTIONAL:  Resp CTA; no rhonchi, rales, or wheezes.  Respiration effort normal  CV RRR; no M/R/G. No lower extremity edema  GI Abd soft, NT, ND, normal active bowel sounds.    Psych: Awake and alert    DATA:Results for LYNDSEY AMAYA (MRN [id number]) as of 9/22/2018 15:34   Ref. Range 9/21/2018 07:40 9/21/2018 16:05 9/21/2018 18:03 9/21/2018 20:05 9/22/2018 08:51   Hemoglobin Latest Ref Range: 11.5 - 15.5 g/dL 9.8 (L) 9.3 (L) 9.8 (L) 9.6 (L) 9.5 (L)     Bx positive for Candida  No mention of H pylori  Assessment/Plan         DU with bleed  (Posterior duodenal artery)  Acute blood loss anemia  Candida esophagitis      REC  Change to PO PPI BID for 1 mo the q day.  HG stable.  Poss DC in am if H/H stable    Change diflucan to po for total of 10 days      Active Problems:    Essential hypertension    Irritable bowel syndrome with both constipation and diarrhea    FTT (failure to thrive) in adult    Bereavement    Anemia    Chronic venous stasis dermatitis of both lower extremities    Upper GI bleed       LOS: 9 days     Elio Bledsoe MD  09/22/18  3:35 PM  "

## 2018-09-22 NOTE — PROGRESS NOTES
[facility] Medicine Services  PROGRESS NOTE    Patient Name: Dacia Nelson  : 1925  MRN: [id number]    Date of Admission: 2018  Length of Stay: 9  Primary Care Physician: Momo Breaux PA    Subjective   Subjective     CC:  Generalized weakness    HPI:  No brbpr or melena. No n/v/d    Review of Systems   HENT: Negative for postnasal drip.    Cardiovascular: Negative for leg swelling.   Genitourinary: Negative for decreased urine volume.   Hematological: Negative for adenopathy.       Gen- No fevers, chills  CV- No chest pain, palpitations  Resp- No cough, dyspnea  GI- No N/V/D, abd pain    Otherwise ROS is negative except as mentioned in the HPI.    Objective   Objective     Vital Signs:   Temp:  [97.9 °F (36.6 °C)-98.2 °F (36.8 °C)] 98.2 °F (36.8 °C)  Heart Rate:  [64-73] 73  Resp:  [15-16] 16  BP: (112-125)/(47-69) 113/55  Total (NIH Stroke Scale): 3     Physical Exam:    Constitutional: No acute distress, awake, alert  HENT: NCAT, dry tongue  Respiratory: Clear to auscultation bilaterally, respiratory effort normal   Cardiovascular: RRR, s1 and s2  Gastrointestinal: Positive bowel sounds, soft, nontender, nondistended  Musculoskeletal: No bilateral ankle edema  Psychiatric: Appropriate affect, cooperative  Neurologic: Oriented x 3, strength symmetric in all extremities, Cranial Nerves grossly intact to confrontation, speech clear  Skin: dry skin    Results Reviewed:  I have personally reviewed current lab, radiology, and data and agree.      Results from last 7 days  Lab Units 18  0851 18  1803  09/19/18  1112   WBC 10*3/mm3  --   --   --   --  11.96*   HEMOGLOBIN g/dL 9.5* 9.6* 9.8*  < > 9.3*   HEMATOCRIT % 28.3* 28.4* 29.7*  < > 27.8*   PLATELETS 10*3/mm3  --   --   --   --  242   < > = values in this interval not displayed.    Results from last 7 days  Lab Units 18  0851 18  0740 18  1112   SODIUM mmol/L 143 139  135   POTASSIUM mmol/L 4.3 4.4 5.0   CHLORIDE mmol/L 114* 116* 107   CO2 mmol/L 19.0* 20.0 22.0   BUN mg/dL 33* 39* 31*   CREATININE mg/dL 1.19 1.24 1.28   GLUCOSE mg/dL 84 79 152*   CALCIUM mg/dL 7.4* 6.7* 7.6*   ALT (SGPT) U/L  --   --  60*   AST (SGOT) U/L  --   --  60*     Estimated Creatinine Clearance: 27.7 mL/min (by C-G formula based on SCr of 1.19 mg/dL).  No results found for: BNP    Microbiology Results Abnormal     Procedure Component Value - Date/Time    Urine Culture - Urine, [[id number]]  (Abnormal)  (Susceptibility) Collected:  09/13/18 1813    Lab Status:  Final result Specimen:  Urine from Urine, Clean Catch Updated:  09/15/18 0928     Urine Culture >100,000 CFU/mL Escherichia coli (A)    Susceptibility      Escherichia coli     RICO     Amikacin <=16 ug/ml Susceptible     Ampicillin >16 ug/ml Resistant     Ampicillin + Sulbactam 16/8 ug/ml Intermediate     Aztreonam <=8 ug/ml Susceptible     Cefepime <=8 ug/ml Susceptible     Cefotaxime <=2 ug/ml Susceptible     Ceftriaxone <=8 ug/ml Susceptible     Cefuroxime sodium <=4 ug/ml Susceptible     Cephalothin 16 ug/ml Intermediate     Ertapenem <=1 ug/ml Susceptible     Gentamicin >8 ug/ml Resistant     Levofloxacin <=2 ug/ml Susceptible     Meropenem <=1 ug/ml Susceptible     Nitrofurantoin <=32 ug/ml Susceptible     Piperacillin + Tazobactam <=16 ug/ml Susceptible     Tetracycline >8 ug/ml Resistant     Tobramycin 8 ug/ml Intermediate     Trimethoprim + Sulfamethoxazole >2/38 ug/ml Resistant                        Imaging Results (last 24 hours)     ** No results found for the last 24 hours. **        I have reviewed the medications.      [START ON 9/23/2018] fluconazole 100 mg Oral Daily   gabapentin 100 mg Oral Nightly   multivitamin with minerals 1 tablet Oral Daily   pantoprazole 40 mg Oral BID AC   potassium chloride 10 mEq Oral Daily   sertraline 50 mg Oral Daily   cyancobalamin 100 mcg Oral Daily         Assessment/Plan   Assessment / Plan      Hospital Problem List     * (Principal)Upper GI bleed    Overview Signed 9/19/2018  4:35 PM by Nayeli Rivas PA     Added automatically from request for surgery [id number]         Duodenal ulcer    Essential hypertension    Irritable bowel syndrome with both constipation and diarrhea    FTT (failure to thrive) in adult    Bereavement    Anemia    Chronic venous stasis dermatitis of both lower extremities    Myalgia    B12 deficiency        Brief Hospital Course to date:  Dacia Nelson is a 89 y.o. female admitted with weakness and was planned for rehab who had a bloody bm , was moved to telemetry and ultimately underwent egd by dr. wing whowing large duodenal ulcer w/ clot and visible vessel (s/p epi, clip, hemospray) and canidida noted as well    Assessment & Plan:    Acute upper GI bleed w/ symptomatic acute blood loss anemia   -s/p 2 units prbc 9/19/18   -s/p 2 units prbc 9/20/18  Bleeding duodenal ulcer (w/ clot and visible vessel)   S/p egd: large duodenal ulcer s/p endo clip and hemospray  Candida esophagitis   Also seen on egd; on diflucan  Syncope due to gi bleed  Generalized Weakness  - multifactorial problem  Myalgias   -was placed empirically for elevated esr (? Pmr), however the age corrected esr was normal (female age +10 / 2). Stopping steroids  S/p Elan (resolved)  ckd (baseline cr 1.2)  S/p E coli UTI (completed rx)  - s/p Invanz  B12 Deficiency   -replacing  ----------------------------  Plan:  -no overt gi bleeding, hgb stable >9  -path negative from egd  -stop steroids due to risk benefit profile (severe duodenal ulcer w/ hemorrhage). Since has taken <10 days, will stop without taper (esr was normal given sex & age: females age +10/2)  -bid ppi x 1 month, then daily  -diflucan 100mg po x 7 more days (to treat 10 days total) for     -hgb, bmp in a.m.    *if rebleeds would need repeat egd (have stopped decadron)    *here over the weekend to ensure hgb stability (to [facility] tentatively  if bed still available then per 9/21/18 case management note)       DVT Prophylaxis: SCDs    CODE STATUS:   Code Status and Medical Interventions:   Ordered at: 09/13/18 2001     Limited Support to NOT Include:    Intubation    Dialysis    Cardioversion/Defibrillation    Artificial Nutrition     Level Of Support Discussed With:    Patient     Code Status:    No CPR     Medical Interventions (Level of Support Prior to Arrest):    Limited     Disposition: I expect the patient to be discharged    Electronically signed by Igor Fairbanks MD, 09/22/18, 4:14 PM.

## 2018-09-23 LAB
ANION GAP SERPL CALCULATED.3IONS-SCNC: 5 MMOL/L (ref 3–11)
BUN BLD-MCNC: 32 MG/DL (ref 9–23)
BUN/CREAT SERPL: 28.6 (ref 7–25)
CALCIUM SPEC-SCNC: 7.6 MG/DL (ref 8.7–10.4)
CHLORIDE SERPL-SCNC: 115 MMOL/L (ref 99–109)
CO2 SERPL-SCNC: 20 MMOL/L (ref 20–31)
CREAT BLD-MCNC: 1.12 MG/DL (ref 0.6–1.3)
GFR SERPL CREATININE-BSD FRML MDRD: 45 ML/MIN/1.73
GLUCOSE BLD-MCNC: 78 MG/DL (ref 70–100)
HCT VFR BLD AUTO: 28.7 % (ref 34.5–44)
HGB BLD-MCNC: 9.2 G/DL (ref 11.5–15.5)
POTASSIUM BLD-SCNC: 4.1 MMOL/L (ref 3.5–5.5)
SODIUM BLD-SCNC: 140 MMOL/L (ref 132–146)

## 2018-09-23 PROCEDURE — 85014 HEMATOCRIT: CPT | Performed by: INTERNAL MEDICINE

## 2018-09-23 PROCEDURE — 85018 HEMOGLOBIN: CPT | Performed by: INTERNAL MEDICINE

## 2018-09-23 PROCEDURE — 80048 BASIC METABOLIC PNL TOTAL CA: CPT | Performed by: INTERNAL MEDICINE

## 2018-09-23 PROCEDURE — 99232 SBSQ HOSP IP/OBS MODERATE 35: CPT | Performed by: INTERNAL MEDICINE

## 2018-09-23 RX ADMIN — VITAM B12 100 MCG: 100 TAB at 09:37

## 2018-09-23 RX ADMIN — POTASSIUM CHLORIDE 10 MEQ: 750 CAPSULE, EXTENDED RELEASE ORAL at 09:32

## 2018-09-23 RX ADMIN — GABAPENTIN 100 MG: 100 CAPSULE ORAL at 20:52

## 2018-09-23 RX ADMIN — MULTIPLE VITAMINS W/ MINERALS TAB 1 TABLET: TAB ORAL at 09:32

## 2018-09-23 RX ADMIN — PANTOPRAZOLE SODIUM 40 MG: 40 TABLET, DELAYED RELEASE ORAL at 07:06

## 2018-09-23 RX ADMIN — PANTOPRAZOLE SODIUM 40 MG: 40 TABLET, DELAYED RELEASE ORAL at 17:46

## 2018-09-23 RX ADMIN — SERTRALINE HYDROCHLORIDE 50 MG: 50 TABLET ORAL at 09:32

## 2018-09-23 RX ADMIN — FLUCONAZOLE 100 MG: 100 TABLET ORAL at 09:32

## 2018-09-23 NOTE — PROGRESS NOTES
[facility] Medicine Services  PROGRESS NOTE    Patient Name: Dacia Nelson  : 1925  MRN: [id number]    Date of Admission: 2018  Length of Stay: 10  Primary Care Physician: Momo Breaux PA    Subjective   Subjective     CC:  Generalized weakness     HPI:  No brbpr or melena. No n/v/d    Review of Systems   HENT: Negative for postnasal drip.    Cardiovascular: Negative for leg swelling.   Genitourinary: Negative for decreased urine volume.   Hematological: Negative for adenopathy.       Gen- No fevers, chills  CV- No chest pain, palpitations  Resp- No cough, dyspnea  GI- No N/V/D, abd pain    Otherwise ROS is negative except as mentioned in the HPI.    Objective   Objective     Vital Signs:   Temp:  [96.8 °F (36 °C)-97.9 °F (36.6 °C)] 97.9 °F (36.6 °C)  Heart Rate:  [60-69] 60  Resp:  [16-18] 18  BP: (108-117)/(47-61) 116/53  Total (NIH Stroke Scale): 3     Physical Exam:    Constitutional: No acute distress, awake, alert  HENT: NCAT, dry tongue  Respiratory: Clear to auscultation bilaterally, respiratory effort normal   Cardiovascular: RRR, s1 and s2  Gastrointestinal: Positive bowel sounds, soft, nontender, nondistended  Musculoskeletal: No bilateral ankle edema  Psychiatric: Appropriate affect, cooperative  Neurologic: Oriented x 3, strength symmetric in all extremities, Cranial Nerves grossly intact to confrontation, speech clear  Skin: dry skin    Results Reviewed:  I have personally reviewed current lab, radiology, and data and agree.      Results from last 7 days  Lab Units 18  1112   WBC 10*3/mm3  --   --   --   --  11.96*   HEMOGLOBIN g/dL 9.2* 9.5* 9.6*  < > 9.3*   HEMATOCRIT % 28.7* 28.3* 28.4*  < > 27.8*   PLATELETS 10*3/mm3  --   --   --   --  242   < > = values in this interval not displayed.    Results from last 7 days  Lab Units 18  0718/19/18  1112   SODIUM  mmol/L 140 143 139 135   POTASSIUM mmol/L 4.1 4.3 4.4 5.0   CHLORIDE mmol/L 115* 114* 116* 107   CO2 mmol/L 20.0 19.0* 20.0 22.0   BUN mg/dL 32* 33* 39* 31*   CREATININE mg/dL 1.12 1.19 1.24 1.28   GLUCOSE mg/dL 78 84 79 152*   CALCIUM mg/dL 7.6* 7.4* 6.7* 7.6*   ALT (SGPT) U/L  --   --   --  60*   AST (SGOT) U/L  --   --   --  60*     Estimated Creatinine Clearance: 29.4 mL/min (by C-G formula based on SCr of 1.12 mg/dL).  No results found for: BNP    Microbiology Results Abnormal     Procedure Component Value - Date/Time    Urine Culture - Urine, [[id number]]  (Abnormal)  (Susceptibility) Collected:  09/13/18 1813    Lab Status:  Final result Specimen:  Urine from Urine, Clean Catch Updated:  09/15/18 0928     Urine Culture >100,000 CFU/mL Escherichia coli (A)    Susceptibility      Escherichia coli     RICO     Amikacin <=16 ug/ml Susceptible     Ampicillin >16 ug/ml Resistant     Ampicillin + Sulbactam 16/8 ug/ml Intermediate     Aztreonam <=8 ug/ml Susceptible     Cefepime <=8 ug/ml Susceptible     Cefotaxime <=2 ug/ml Susceptible     Ceftriaxone <=8 ug/ml Susceptible     Cefuroxime sodium <=4 ug/ml Susceptible     Cephalothin 16 ug/ml Intermediate     Ertapenem <=1 ug/ml Susceptible     Gentamicin >8 ug/ml Resistant     Levofloxacin <=2 ug/ml Susceptible     Meropenem <=1 ug/ml Susceptible     Nitrofurantoin <=32 ug/ml Susceptible     Piperacillin + Tazobactam <=16 ug/ml Susceptible     Tetracycline >8 ug/ml Resistant     Tobramycin 8 ug/ml Intermediate     Trimethoprim + Sulfamethoxazole >2/38 ug/ml Resistant                        Imaging Results (last 24 hours)     ** No results found for the last 24 hours. **        I have reviewed the medications.      fluconazole 100 mg Oral Daily   gabapentin 100 mg Oral Nightly   multivitamin with minerals 1 tablet Oral Daily   pantoprazole 40 mg Oral BID AC   potassium chloride 10 mEq Oral Daily   sertraline 50 mg Oral Daily   cyancobalamin 100 mcg Oral Daily          Assessment/Plan   Assessment / Plan     Hospital Problem List     * (Principal)Upper GI bleed    Overview Signed 9/19/2018  4:35 PM by Nayeli Rivas PA     Added automatically from request for surgery [id number]         Duodenal ulcer    Essential hypertension    Irritable bowel syndrome with both constipation and diarrhea    FTT (failure to thrive) in adult    Bereavement    Anemia    Chronic venous stasis dermatitis of both lower extremities    Myalgia    B12 deficiency        Brief Hospital Course to date:  Dacia Nelson is a 89 y.o. female admitted with weakness and was planned for rehab who had a bloody bm , was moved to telemetry and ultimately underwent egd by dr. wing whowing large duodenal ulcer w/ clot and visible vessel (s/p epi, clip, hemospray) and canidida noted as well    Assessment & Plan:    Acute upper GI bleed w/ symptomatic acute blood loss anemia   -s/p 2 units prbc 9/19/18   -s/p 2 units prbc 9/20/18  Bleeding duodenal ulcer (w/ clot and visible vessel)   S/p egd: large duodenal ulcer s/p endo clip and hemospray  Candida esophagitis   Also seen on egd; on diflucan  Syncope due to gi bleed  Generalized Weakness  - multifactorial problem  Myalgias   -was placed empirically for elevated esr (? Pmr), however the age corrected esr was normal (female age +10 / 2). Stopping steroids  S/p Elan (resolved)  ckd (baseline cr 1.2)  S/p E coli UTI (completed rx)  - s/p Invanz  B12 Deficiency   -replacing  ----------------------------  Plan:  -hgb stable  -stopped steroids 9/22/18 (had been on for 9.5 days for possible p.m.r as esr was 50 upon admission w/ myalgias; however that is normal for age & sex adjusted) and in light of severe bleeding duodenal ulcers risk benefit favored discontinuation)  -bid ppi x 1 month, then daily thereafter (treating the bleeding duodenal ulcer)  -diflucan 100mg po daily through 9/29/18 (to complete 10 days) for candida esophagitis    -hgb in a.m.    *if rebleeds  would need repeat egd (have stopped decadron)    *here over the weekend to ensure hgb stability (to [facility]atively if bed still available early next week)      DVT Prophylaxis: SCDs    CODE STATUS:   Code Status and Medical Interventions:   Ordered at: 09/13/18 2001     Limited Support to NOT Include:    Intubation    Dialysis    Cardioversion/Defibrillation    Artificial Nutrition     Level Of Support Discussed With:    Patient     Code Status:    No CPR     Medical Interventions (Level of Support Prior to Arrest):    Limited     Disposition: I expect the patient to be discharged    Electronically signed by Igor Fairbanks MD, 09/23/18, 6:16 PM.

## 2018-09-23 NOTE — PLAN OF CARE
Problem: Patient Care Overview  Goal: Plan of Care Review  Outcome: Ongoing (interventions implemented as appropriate)    Goal: Discharge Needs Assessment  Outcome: Ongoing (interventions implemented as appropriate)   09/23/18 0342   Discharge Needs Assessment   Concerns to be Addressed discharge planning   Transportation Anticipated family or friend will provide   Outpatient/Agency/Support Group Needs assisted living facility;skilled nursing facility   Disability   Equipment Currently Used at Home walker, standard       Problem: Fall Risk (Adult)  Goal: Absence of Fall  Outcome: Outcome(s) achieved Date Met: 09/23/18 09/23/18 0342   Fall Risk (Adult)   Absence of Fall achieves outcome

## 2018-09-23 NOTE — PROGRESS NOTES
"GI Daily Progress Note  Subjective     Lyndsey Amaya is a 89 y.o. female who was admitted with Upper GI bleed.   Feeling better.  No further melena.  Eating some    Chief Complaint:  melena    Objective     /59 (BP Location: Right arm, Patient Position: Lying)   Pulse 62   Temp 97.8 °F (36.6 °C) (Oral)   Resp 16   Ht 157.5 cm (62\")   Wt 70.1 kg (154 lb 8 oz)   SpO2 93%   BMI 28.26 kg/m²     Intake/Output last 3 shifts:  I/O last 3 completed shifts:  In: 2138.1 [P.O.:360; I.V.:1778.1]  Out: 650 [Urine:650]  Intake/Output this shift:  I/O this shift:  In: 240 [P.O.:240]  Out: -       Physical Exam  Wt Readings from Last 3 Encounters:   09/20/18 70.1 kg (154 lb 8 oz)   08/21/18 68 kg (150 lb)   08/13/18 68 kg (150 lb)   ,body mass index is 28.26 kg/m².,@FLOWAMB(6)@,@FLOWAMB(5)@,@FLOWAMB(8)@   CONSTITUTIONAL:  Resp CTA; no rhonchi, rales, or wheezes.  Respiration effort normal  CV RRR; no M/R/G. No lower extremity edema  GI Abd soft, NT, ND, normal active bowel sounds.    Psych: Awake and alert    DATA:Results for LYNDSEY AMAYA (MRN [id number]) as of 9/23/2018 14:19   Ref. Range 9/20/2018 15:41 9/20/2018 23:39 9/21/2018 07:40 9/21/2018 16:05 9/21/2018 18:03 9/21/2018 20:05 9/22/2018 08:51 9/23/2018 07:18   Hemoglobin Latest Ref Range: 11.5 - 15.5 g/dL 7.6 (L) 10.4 (L) 9.8 (L) 9.3 (L) 9.8 (L) 9.6 (L) 9.5 (L) 9.2 (L)       Assessment/Plan     Duodenal ulcer, large, with bleeding, s/p endo clip and hemospray  Esophageal candidiasis   Acute blood loss anemia, stable   Syncope    Doing well.  No further bleeding   will sign off.  Please re consult if needed      Principal Problem:    Upper GI bleed  Active Problems:    Essential hypertension    Irritable bowel syndrome with both constipation and diarrhea    FTT (failure to thrive) in adult    Bereavement    Anemia    Chronic venous stasis dermatitis of both lower extremities    Myalgia    B12 deficiency    Duodenal ulcer       LOS: 10 days     Elio A. " MD [place]  09/23/18  2:18 PM

## 2018-09-23 NOTE — PLAN OF CARE
Problem: Patient Care Overview  Goal: Plan of Care Review  Outcome: Ongoing (interventions implemented as appropriate)   09/23/18 6623   Plan of Care Review   Progress improving   OTHER   Outcome Summary VSS, no new changes, no new complaints. No evidence of further bleeding. More awake today than yesterday.    Coping/Psychosocial   Plan of Care Reviewed With patient     Goal: Individualization and Mutuality  Outcome: Ongoing (interventions implemented as appropriate)    Goal: Discharge Needs Assessment  Outcome: Ongoing (interventions implemented as appropriate)    Goal: Interprofessional Rounds/Family Conf  Outcome: Ongoing (interventions implemented as appropriate)      Problem: Fall Risk (Adult)  Goal: Absence of Fall  Outcome: Ongoing (interventions implemented as appropriate)      Problem: Skin Injury Risk (Adult)  Goal: Skin Health and Integrity  Outcome: Ongoing (interventions implemented as appropriate)      Problem: Gastrointestinal Bleeding (Adult)  Goal: Signs and Symptoms of Listed Potential Problems Will be Absent, Minimized or Managed (Gastrointestinal Bleeding)  Outcome: Ongoing (interventions implemented as appropriate)

## 2018-09-24 LAB
HCT VFR BLD AUTO: 27.7 % (ref 34.5–44)
HGB BLD-MCNC: 8.9 G/DL (ref 11.5–15.5)

## 2018-09-24 PROCEDURE — 85014 HEMATOCRIT: CPT | Performed by: INTERNAL MEDICINE

## 2018-09-24 PROCEDURE — 97110 THERAPEUTIC EXERCISES: CPT

## 2018-09-24 PROCEDURE — 85018 HEMOGLOBIN: CPT | Performed by: INTERNAL MEDICINE

## 2018-09-24 PROCEDURE — 99232 SBSQ HOSP IP/OBS MODERATE 35: CPT | Performed by: PHYSICIAN ASSISTANT

## 2018-09-24 PROCEDURE — 97530 THERAPEUTIC ACTIVITIES: CPT

## 2018-09-24 RX ORDER — GABAPENTIN 100 MG/1
200 CAPSULE ORAL NIGHTLY
Status: DISCONTINUED | OUTPATIENT
Start: 2018-09-24 | End: 2018-09-25 | Stop reason: HOSPADM

## 2018-09-24 RX ADMIN — PANTOPRAZOLE SODIUM 40 MG: 40 TABLET, DELAYED RELEASE ORAL at 06:00

## 2018-09-24 RX ADMIN — FLUCONAZOLE 100 MG: 100 TABLET ORAL at 08:50

## 2018-09-24 RX ADMIN — VITAM B12 100 MCG: 100 TAB at 08:50

## 2018-09-24 RX ADMIN — MULTIPLE VITAMINS W/ MINERALS TAB 1 TABLET: TAB ORAL at 08:49

## 2018-09-24 RX ADMIN — POTASSIUM CHLORIDE 10 MEQ: 750 CAPSULE, EXTENDED RELEASE ORAL at 08:49

## 2018-09-24 RX ADMIN — SERTRALINE HYDROCHLORIDE 50 MG: 50 TABLET ORAL at 08:50

## 2018-09-24 RX ADMIN — GABAPENTIN 200 MG: 100 CAPSULE ORAL at 21:13

## 2018-09-24 RX ADMIN — PANTOPRAZOLE SODIUM 40 MG: 40 TABLET, DELAYED RELEASE ORAL at 17:58

## 2018-09-24 NOTE — PROGRESS NOTES
Case Management Discharge Note    Final Note: Met with patient and her daughter in the room to discuss the discharge plan. They would like a referral to [place]. Referral called to [facility], and she is able to offer patient a bed in a semi-private room on Tuesday, 9/25/18, if patient is medically ready. Per PREMA Mo, patient should be medically ready tomorrow. Spoke with patient in the room, and she would like to accept the bed. Transportation arranged with [facility]. Radames with [facility] will be here between 14:15 and 15:30 to take patient to [place] by private WC van. He will pick patient up in her room. Called patient's daugher, Mandy, to update her on the plan. [facility] will pull the DC summary from [facility]. RN, please call report to [phone], and please send a copy of the DC summary/AVS and any hard scripts for controlled substances in the discharge packet. Thank you.     Destination - Selection Complete     Service Request Status Selected Specialties Address Phone Number Fax Number    [facility] Selected Skilled Nursing Facility [address] [id number] [phone]      Durable Medical Equipment     No service has been selected for the patient.      Dialysis/Infusion     No service has been selected for the patient.      Home Medical Care     No service has been selected for the patient.      Social Care     No service has been selected for the patient.        Ambulance: [facility] Transportation    Final Discharge Disposition Code: 03 - skilled nursing facility (SNF)

## 2018-09-24 NOTE — THERAPY TREATMENT NOTE
Acute Care - Physical Therapy Treatment Note  [facility]     Patient Name: Dacia Nelson  : 1925  MRN: [id number]  Today's Date: 2018  Onset of Illness/Injury or Date of Surgery: 18  Date of Referral to PT: 18  Referring Physician: LEXI Rodarte    Admit Date: 2018    Visit Dx:    ICD-10-CM ICD-9-CM   1. Acute renal failure, unspecified acute renal failure type (CMS/[facility]) N17.9 584.9   2. Dehydration with hyponatremia E87.1 276.1   3. Adult failure to thrive syndrome R62.7 783.7   4. Malnutrition compromising bodily function (CMS/[facility]) E46 263.9   5. Dependent edema R60.9 782.3   6. Impaired functional mobility, balance, gait, and endurance Z74.09 V49.89   7. Impaired mobility and ADLs Z74.09 799.89   8. Upper GI bleed K92.2 578.9     Patient Active Problem List   Diagnosis   • Essential hypertension   • Irritable bowel syndrome with both constipation and diarrhea   • Primary osteoarthritis involving multiple joints   • FTT (failure to thrive) in adult   • Bereavement   • Anemia   • Chronic venous stasis dermatitis of both lower extremities   • Upper GI bleed   • Myalgia   • B12 deficiency   • Duodenal ulcer       Therapy Treatment          Rehabilitation Treatment Summary     Row Name 18 1108 18 0917          Treatment Time/Intention    Discipline physical therapist  -CD occupational therapist  -PAULA     Document Type therapy note (daily note)  -CD progress note/recertification  -JB2     Subjective Information complains of;weakness;fatigue;dizziness   DIZZINESS WITH THIRD TIME SIT TO STAND.   -CD complains of;weakness;fatigue  -PAULA     Mode of Treatment physical therapy   PT REFUSED FIRST ATTEMPT DUE TO FATIGUE FROM O.T.   -CD individual therapy;occupational therapy  -PAULA     Patient/Family Observations PT SITTING UIC UPON ARRIVAL ON RA AND WITH DTR PRESENT.   -CD Pt. sitting up in bed having had BF.  -PAULA     Care Plan Review care plan/treatment goals reviewed  -CD2 care  plan/treatment goals reviewed;risks/benefits reviewed;current/potential barriers reviewed  -PAULA     Care Plan Review, Other Participant(s) daughter  -CD2  --     Total Minutes, Physical Therapy Treatment 40  -CD2  --     Therapy Frequency (PT Clinical Impression) daily  -CD2  --     Patient Effort good   BUT NEEDS ENCOURAGEMENT.   -CD2 good  -PAULA     Existing Precautions/Restrictions fall  -CD2 fall   sensitive LE's  -JB3     Treatment Considerations/Comments MONITOR BP.   -CD2  --     Recorded by [CD] Angela Galvin, PT 09/24/18 1542  [CD2] Angela Galvin, PT 09/24/18 1554 [PAULA] Emeli Baer, OT 09/24/18 0953  [JB2] Emeli Bear, OT 09/24/18 1007  [JB3] Emeli Bear, OT 09/24/18 1003     Row Name 09/24/18 1108 09/24/18 0917          Vital Signs    Pre Systolic BP Rehab 129  -  -PAULA     Pre Treatment Diastolic BP 57  -CD 57  -PAULA     Intra Systolic BP Rehab 110  -CD  --     Intra Treatment Diastolic BP 63  -CD  --     Post Systolic BP Rehab 93   TOOK BP AT END WITH PT RECLINED @ 123  -CD  --     Post Treatment Diastolic BP 58  -CD  --     Pretreatment Heart Rate (beats/min) 62  -CD 67  -PAULA     Intratreatment Heart Rate (beats/min) 81  -CD  --     Posttreatment Heart Rate (beats/min) 94  -CD 76  -PAULA     Pre SpO2 (%) 90  -CD 90  -PAULA     O2 Delivery Pre Treatment room air  -CD room air  -PAULA     Post SpO2 (%) 92  -CD 92  -PAULA     O2 Delivery Post Treatment room air  -CD room air  -PAULA     Pre Patient Position  -- Supine  -PAULA     Intra Patient Position Sitting  -CD Standing  -PAULA     Post Patient Position Standing  -CD Sitting  -PAULA     Recorded by [CD] Angela Galvin, PT 09/24/18 1554 [PAULA] Emeli Bear, OT 09/24/18 1003     Row Name 09/24/18 1108 09/24/18 0917          Cognitive Assessment/Intervention- PT/OT    Affect/Mental Status (Cognitive)  -- WFL  -PAULA     Orientation Status (Cognition) oriented to;person  -CD oriented to;person  -PAULA     Follows Commands (Cognition) follows one step commands;over 90%  accuracy;verbal cues/prompting required;repetition of directions required  -CD follows one step commands;over 90% accuracy;repetition of directions required   [place] so at times needs repitition  -PAULA     Cognitive Function (Cognitive)  -- safety deficit  -PAULA     Safety Deficit (Cognitive)  -- mild deficit  -PAULA     Personal Safety Interventions fall prevention program maintained;gait belt;muscle strengthening facilitated;nonskid shoes/slippers when out of bed;supervised activity  -CD fall prevention program maintained;gait belt;nonskid shoes/slippers when out of bed  -PAULA     Cognitive Assessment/Intervention Comment [place]. SPEAK LOUDLY. NEED ENCOURAGEMENT.   -CD  --     Recorded by [CD] Angela Galvin, PT 09/24/18 1554 [PAULA] Emeli Bear, OT 09/24/18 1003     Row Name 09/24/18 1108 09/24/18 0917          Safety Issues, Functional Mobility    Safety Issues Affecting Function (Mobility) awareness of need for assistance;insight into deficits/self awareness  -CD  --     Impairments Affecting Function (Mobility) endurance/activity tolerance;strength;postural/trunk control;balance  -CD endurance/activity tolerance;strength;postural/trunk control  -PAULA     Comment, Safety Issues/Impairments (Mobility) ANXIOUS WITH MOBILITY. C/O DIZZINESS.   -CD  --     Recorded by [CD] Angela Galvin, PT 09/24/18 1554 [PAULA] Emeli Bear, OT 09/24/18 1003     Row Name 09/24/18 1108 09/24/18 0917          Bed Mobility Assessment/Treatment    Bed Mobility Assessment/Treatment  -- rolling left;supine-sit;scooting/bridging  -     Rolling Left [place] (Bed Mobility)  -- minimum assist (75% patient effort);nonverbal cues (demo/gesture);verbal cues  -PAULA     Scooting/Bridging [place] (Bed Mobility)  -- maximum assist (25% patient effort);verbal cues  -PAULA     Supine-Sit [place] (Bed Mobility)  -- moderate assist (50% patient effort);nonverbal cues (demo/gesture);verbal cues  -PAULA     Bed Mobility, Safety Issues  -- decreased use of arms  for pushing/pulling;decreased use of legs for bridging/pushing;impaired trunk control for bed mobility  -PAULA     Assistive Device (Bed Mobility)  -- bed rails;head of bed elevated  -PAULA     Comment (Bed Mobility) PT UIC.   -CD Pt. needing step by step cueing to sequence task.  -PAULA     Recorded by [CD] Angela Galvin, PT 09/24/18 1554 [PAULA] Emeli Bear, OT 09/24/18 1003     Row Name 09/24/18 0917             Functional Mobility    Functional Mobility- Ind. Level minimum assist (75% patient effort)  -PAULA      Functional Mobility- Device rolling walker  -PAULA      Functional Mobility-Distance (Feet) 2   several steps to chair.  -PAULA      Functional Mobility- Safety Issues step length decreased  -PAULA      Functional Mobility- Comment flexed posture.  -PAULA      Recorded by [PAULA] Emeli Bear, OT 09/24/18 1003      Row Name 09/24/18 1108 09/24/18 0917          Transfer Assessment/Treatment    Transfer Assessment/Treatment sit-stand transfer;stand-sit transfer  -CD sit-stand transfer;stand-sit transfer  -PAULA     Comment (Transfers) 5X FROM RECLINER WITH CUES FOR HAND PLACEMENT. REQUIRED REST BREAKS.  ON FINAL REP, C/O DIZZINESS.   -CD  --     Recorded by [CD] Angela Galvin, PT 09/24/18 1554 [PAULA] Emeli Bear, OT 09/24/18 1003     Row Name 09/24/18 1108 09/24/18 0917          Sit-Stand Transfer    Sit-Stand [place] (Transfers) minimum assist (75% patient effort);2 person assist;verbal cues   COMPLETED X 5 REPS FROM RECLINER. IMPROVED EACH REP.   -CD minimum assist (75% patient effort);verbal cues  -PAULA     Assistive Device (Sit-Stand Transfers) walker, front-wheeled  -CD walker, front-wheeled   cues for hand placement.  -PAULA     Recorded by [CD] Angela Galvin, PT 09/24/18 1554 [PAULA] Emeli Bear, OT 09/24/18 1003     Row Name 09/24/18 1108 09/24/18 0917          Stand-Sit Transfer    Stand-Sit [place] (Transfers) minimum assist (75% patient effort);2 person assist;verbal cues  -CD minimum assist (75% patient  effort);verbal cues  -PAULA     Assistive Device (Stand-Sit Transfers) walker, front-wheeled  -CD walker, front-wheeled   pt. wanting to sit and not reach back.  -PAULA     Recorded by [CD] Angela Galvin, PT 09/24/18 1554 [PAULA] Emeli Bear, OT 09/24/18 1003     Row Name 09/24/18 1108             Gait/Stairs Assessment/Training    Comment (Gait/Stairs) DEFERRED DUE TO WEAKNESS, C/O DIZZINESS. FOCUSED ON STANDING TOLERANCE.   -CD      Recorded by [CD] Angela Galvin, PT 09/24/18 1554      Row Name 09/24/18 0917             ADL Assessment/Intervention    92899 - OT Self Care/Mgmt Minutes 10  -PAULA      BADL Assessment/Intervention upper body dressing;grooming;lower body dressing  -PAULA      Recorded by [PAULA] Emeli Bear, OT 09/24/18 1003      Row Name 09/24/18 0917             Upper Body Dressing Assessment/Training    Upper Body Dressing [place] Level front opening garment;maximum assist (25% patient effort)  -PAULA      Upper Body Dressing Position edge of bed sitting  -PAULA      Comment (Upper Body Dressing) limited sh. ROM and strength  -PAULA      Recorded by [PAULA] Emeli Bear, OT 09/24/18 1003      Row Name 09/24/18 0917             Lower Body Dressing Assessment/Training    Comment (Lower Body Dressing) due to leg soreness and fatigue pt. declined AE use today  -PAULA      Recorded by [PAULA] Emeli Bear, OT 09/24/18 1003      Row Name 09/24/18 0917             Grooming Assessment/Training    Assumption Level (Grooming) wash face, hands;supervision;set up;oral care regimen;minimum assist (75% patient effort);hair care, combing/brushing;moderate assist (50% patient effort)  -PAULA      Grooming Position unsupported sitting  -PAULA      Comment (Grooming) Pt. post setup and rinsing dentures pt. cleaned lower teeth and brushed dentures.  Pt. washed face and hands.  Pt. could only brush sides of hair due to limited strength and ROM  -PAULA      Recorded by [PAULA] Emeli Bear, OT 09/24/18 1003      Row Name 09/24/18 0907              BADL Safety/Performance    Impairments, BADL Safety/Performance strength;range of motion;endurance/activity tolerance  -PAULA      Progress in BADL Status improvement noted  -PAULA      Recorded by [PAULA] Emeli Bear, OT 09/24/18 1003      Row Name 09/24/18 1108             Motor Skills Assessment/Interventions    Additional Documentation Therapeutic Exercise (Group);Therapeutic Exercise Interventions (Group);Balance (Group);Balance Interventions (Group)  -CD      Recorded by [CD] Angela Galvin, PT 09/24/18 1554      Row Name 09/24/18 1108 09/24/18 0917          Therapeutic Exercise    99145 - PT Therapeutic Exercise Minutes 10  -CD  --     82278 - PT Therapeutic Activity Minutes 30  -CD  --     71063 - OT Therapeutic Exercise Minutes  -- 10  -PAULA     41637 - OT Therapeutic Activity Minutes  -- 11  -PAULA     Recorded by [CD] Angela Galvin, PT 09/24/18 1554 [PAULA] Emeli Bear, OT 09/24/18 1003     Row Name 09/24/18 0917             Upper Extremity Seated Therapeutic Exercise    Performed, Seated Upper Extremity (Therapeutic Exercise) shoulder flexion/extension;shoulder abduction/adduction;shoulder external/internal rotation;shoulder horizontal abduction/adduction;elbow flexion/extension;wrist flexion/extension   sup/pron, resistance biceps and triceps  -PAULA      Exercise Type, Seated Upper Extremity (Therapeutic Exercise) AROM (active range of motion);resistive exercise;AAROM (active assistive range of motion)   aAROM shoulders for romano range  -PAULA      Expected Outcomes, Seated Upper Extremity (Therapeutic Exercise) improve functional tolerance, self-care activity;improve performance, transfer skills  -PAULA      Sets/Reps Detail, Seated Upper Extremity (Therapeutic Exercise) 1/10  -PAULA      Recorded by [PAULA] Emeli Bear, OT 09/24/18 1003      Row Name 09/24/18 1108             Therapeutic Exercise    Lower Extremity (Therapeutic Exercise) LAQ (long arc quad), bilateral;marching while seated;quad sets, bilateral    AP'S  -CD      Exercise Type (Therapeutic Exercise) AROM (active range of motion)  -CD      Position (Therapeutic Exercise) seated  -CD      Sets/Reps (Therapeutic Exercise) 1 SET OF 10 EACH WITH REST BREAKS.   -CD      Recorded by [CD] Angela Galvin, PT 09/24/18 1554      Row Name 09/24/18 1108             Balance    Balance static standing balance  -CD      Recorded by [CD] Angela Galvin, PT 09/24/18 1554      Row Name 09/24/18 0917             Dynamic Sitting Balance    Level of Hall, Reaches Outside Midline (Sitting, Dynamic Balance) supervision  -PAULA      Sitting Position, Reaches Outside Midline (Sitting, Dynamic Balance) sitting on edge of bed  -PAULA      Comment, Reaches Outside Midline (Sitting, Dynamic Balance) grooming task  -PAULA      Recorded by [PAULA] Emeli Bear, OT 09/24/18 1003      Row Name 09/24/18 1108             Static Standing Balance    Level of Hall (Supported Standing, Static Balance) minimal assist, 75% patient effort   X2  -CD      Time Able to Maintain Position (Supported Standing, Static Balance) 15 to 30 seconds   X 5 REPS. LIMITED BY FATIGUE, DIZZINESS.   -CD      Assistive Device Utilized (Supported Standing, Static Balance) rolling walker  -CD      Comment (Unsupported Standing, Static Balance) WORKED ON WT SHIFT R/L, AND UPRIGHT POSTURE WITH STANDING ENDURANCE.   -CD      Recorded by [CD] Angela Galvin, PT 09/24/18 1554      Row Name 09/24/18 0917             Dynamic Standing Balance    Level of Hall, Reaches Outside Midline (Standing, Dynamic Balance) minimal assist, 75% patient effort   wx use.  -PAULA      Recorded by [PAULA] Emeli Bear, OT 09/24/18 1003      Row Name 09/24/18 1108 09/24/18 0917          Positioning and Restraints    Pre-Treatment Position sitting in chair/recliner  -CD in bed  -PAULA     Post Treatment Position chair  -CD chair  -PAULA     In Chair reclined;call light within reach;encouraged to call for assist;exit alarm on;with  family/caregiver;notified nsg;legs elevated  -CD reclined;encouraged to call for assist;call light within reach;exit alarm on;waffle cushion;legs elevated  -PAULA     Recorded by [CD] Angela Galvin, PT 09/24/18 1554 [PAULA] Emeli Bear, OT 09/24/18 1003     Row Name 09/24/18 1108 09/24/18 0917          Pain Scale: Numbers Pre/Post-Treatment    Pain Scale: Numbers, Pretreatment 0/10 - no pain  -CD 0/10 - no pain  -PAULA     Pain Scale: Numbers, Post-Treatment 0/10 - no pain  -CD 0/10 - no pain  -PAULA     Recorded by [CD] Angela Galvin, PT 09/24/18 1554 [PAULA] Emeli Bear, OT 09/24/18 1003     Row Name                Wound 09/09/18 0911 coccyx fissure    Wound - Properties Group Date first assessed: 09/09/18 [MC] Time first assessed: 0911 [MC] Location: coccyx [MC] Type: fissure [MC] Stage, Pressure Injury: Stage 1 [MC] Recorded by:  [MC] Chary Ferguson RN 09/16/18 0916    Row Name 09/24/18 1108 09/24/18 0917          Plan of Care Review    Plan of Care Reviewed With patient  -CD patient  -PAULA     Recorded by [CD] Angela Galvin, PT 09/24/18 1554 [PAULA] Emeli Bear, OT 09/24/18 1003     Row Name 09/24/18 0917             Outcome Summary/Treatment Plan (OT)    Daily Summary of Progress (OT) progress toward functional goals is good  -PAULA      Barriers to Overall Progress (OT) fatigues.  -PAULA      Plan for Continued Treatment (OT) cont OT POC  -PAULA      Recorded by [PAULA] Emeli Bear, OT 09/24/18 1003      Row Name 09/24/18 1108             Outcome Summary/Treatment Plan (PT)    Daily Summary of Progress (PT) progress toward functional goals is gradual  -CD      Barriers to Overall Progress (PT) DECREASED ENDURANCE, NEEDS ENCOURAGEMENT.   -CD      Anticipated Discharge Disposition (PT) skilled nursing facility  -CD      Recorded by [CD] Angela Galvin, PT 09/24/18 6864        User Key  (r) = Recorded By, (t) = Taken By, (c) = Cosigned By    Initials Name Effective Dates Discipline    Emeli Gaytan, OT 06/08/18 -   OT    Angela Arnett, PT 06/19/15 -  PT    Chary Gayle RN 04/17/18 -  Nurse          Wound 09/09/18 0911 coccyx fissure (Active)   Dressing Appearance dry;intact 9/24/2018  8:00 AM   Closure Open to air 9/24/2018  8:00 AM   Base clean;pink 9/24/2018  8:00 AM   Periwound blanchable;intact 9/24/2018  8:00 AM   Periwound Temperature warm 9/24/2018  8:00 AM   Periwound Skin Turgor soft 9/24/2018  8:00 AM   Edges open 9/24/2018  8:00 AM   Drainage Amount none 9/24/2018  8:00 AM   Care, Wound cleansed with 9/23/2018  8:00 PM   Dressing Care, Wound open to air 9/24/2018  8:00 AM             Physical Therapy Education     Title: PT OT SLP Therapies (Active)     Topic: Physical Therapy (Done)     Point: Mobility training (Done)    Learning Progress Summary     Learner Status Readiness Method Response Comment Documented by    Patient Done Acceptance E VU,NR PROGRESSION OF POC, POSTURAL AWARENESS/ENDURANCE, TRANSFER TRAINING, THER EX. CD 09/24/18 1554     Done Acceptance E VU  LD 09/19/18 2104     Done Acceptance E,D VU,NR  LS 09/19/18 1308     Done Acceptance E,TB VU  SC 09/19/18 0511     Done Acceptance E,TB VU  SC 09/18/18 0159     Done Acceptance E,TB VU  SC 09/17/18 0124     Active Acceptance E NR  KR 09/14/18 0924    Family Done Acceptance E VU,NR PROGRESSION OF POC, POSTURAL AWARENESS/ENDURANCE, TRANSFER TRAINING, THER EX.  09/24/18 1554     Done Acceptance E VU  LD 09/19/18 2104     Done Acceptance E,D VU,NR  LS 09/19/18 1308          Point: Home exercise program (Done)    Learning Progress Summary     Learner Status Readiness Method Response Comment Documented by    Patient Done Acceptance E VU,NR PROGRESSION OF POC, POSTURAL AWARENESS/ENDURANCE, TRANSFER TRAINING, THER EX.  09/24/18 1554     Done Acceptance E VU  LD 09/19/18 2104     Done Acceptance E,D VU,NR  LS 09/19/18 1308     Done Acceptance E,TB VU  SC 09/19/18 0511     Done Acceptance E,TB VU  SC 09/18/18 0159     Done Acceptance E,TB RODO  SC  09/17/18 0124    Family Done Acceptance E VU,NR PROGRESSION OF POC, POSTURAL AWARENESS/ENDURANCE, TRANSFER TRAINING, THER EX.  09/24/18 1554     Done Acceptance E VU   09/19/18 2104     Done Acceptance E,D VU,NR  LS 09/19/18 1308          Point: Body mechanics (Done)    Learning Progress Summary     Learner Status Readiness Method Response Comment Documented by    Patient Done Acceptance E VU,NR PROGRESSION OF POC, POSTURAL AWARENESS/ENDURANCE, TRANSFER TRAINING, THER EX.  09/24/18 1554     Done Acceptance E VU   09/19/18 2104     Done Acceptance E,D VU,NR  LS 09/19/18 1308     Done Acceptance E,TB VU  SC 09/19/18 0511     Done Acceptance E,TB VU  SC 09/18/18 0159     Done Acceptance E,TB VU  SC 09/17/18 0124     Active Acceptance E NR  KR 09/14/18 0924    Family Done Acceptance E VU,NR PROGRESSION OF POC, POSTURAL AWARENESS/ENDURANCE, TRANSFER TRAINING, THER EX.  09/24/18 1554     Done Acceptance E VU   09/19/18 2104     Done Acceptance E,D VU,NR   09/19/18 1308          Point: Precautions (Done)    Learning Progress Summary     Learner Status Readiness Method Response Comment Documented by    Patient Done Acceptance E VU,NR PROGRESSION OF POC, POSTURAL AWARENESS/ENDURANCE, TRANSFER TRAINING, THER EX.  09/24/18 1554     Done Acceptance E VU   09/19/18 2104     Done Acceptance E,D VU,NR   09/19/18 1308     Done Acceptance E,TB VU  SC 09/19/18 0511     Done Acceptance E,TB VU  SC 09/18/18 0159     Done Acceptance E,TB VU  SC 09/17/18 0124     Active Acceptance E NR  KR 09/14/18 0924    Family Done Acceptance E VU,NR PROGRESSION OF POC, POSTURAL AWARENESS/ENDURANCE, TRANSFER TRAINING, THER EX.  09/24/18 1554     Done Acceptance E VU   09/19/18 2104     Done Acceptance E,D VU,NR   09/19/18 1308                      User Key     Initials Effective Dates Name Provider Type Discipline     06/19/15 -  Angela Galvin, PT Physical Therapist PT     06/19/15 -  Ramandeep Byrd, PT Physical  Therapist PT    SC 06/16/16 -  Denise Luong, RN Registered Nurse Nurse    YOAV 07/11/18 -  Sarah Ortez RN Registered Nurse Nurse    CHANCE 04/03/18 -  Marylin Liu, PT Physical Therapist PT                    PT Recommendation and Plan  Anticipated Discharge Disposition (PT): skilled nursing facility  Therapy Frequency (PT Clinical Impression): daily  Outcome Summary/Treatment Plan (PT)  Daily Summary of Progress (PT): progress toward functional goals is gradual  Barriers to Overall Progress (PT): DECREASED ENDURANCE, NEEDS ENCOURAGEMENT.   Anticipated Discharge Disposition (PT): skilled nursing facility  Plan of Care Reviewed With: patient  Outcome Summary: PT LIMITED BY WEAKNESS AND FATIGUE BUT RESPONDS WELL TO ENCOURAGEMENT. C/O DIZZINESS IN STANDING AND UNABLE TO AMBULATE BUT TOLERATED SIT TO STAND X 5 REPS WITH CUES FOR UPRIGHT POSTURE WITH MIN ASSIST OF 2. RECOMMEND SNF AT D/C.           Outcome Measures     Row Name 09/24/18 1108 09/24/18 0917          How much help from another person do you currently need...    Turning from your back to your side while in flat bed without using bedrails? 3  -CD  --     Moving from lying on back to sitting on the side of a flat bed without bedrails? 3  -CD  --     Moving to and from a bed to a chair (including a wheelchair)? 2  -CD  --     Standing up from a chair using your arms (e.g., wheelchair, bedside chair)? 2  -CD  --     Climbing 3-5 steps with a railing? 1  -CD  --     To walk in hospital room? 2  -CD  --     AM-PAC 6 Clicks Score 13  -CD  --        How much help from another is currently needed...    Putting on and taking off regular lower body clothing?  -- 2  -PAULA     Bathing (including washing, rinsing, and drying)  -- 2  -PAULA     Toileting (which includes using toilet bed pan or urinal)  -- 2  -PAULA     Putting on and taking off regular upper body clothing  -- 2  -PAULA     Taking care of personal grooming (such as brushing teeth)  -- 3  -PAULA     Eating meals  -- 3   -PAULA     Score  -- 14  -PAULA        Functional Assessment    Outcome Measure Options AM-PAC 6 Clicks Basic Mobility (PT)  -CD AM-PAC 6 Clicks Daily Activity (OT)  -PAULA       User Key  (r) = Recorded By, (t) = Taken By, (c) = Cosigned By    Initials Name Provider Type    Emeli Gaytan, OT Occupational Therapist    CD Angela Galvin, PT Physical Therapist           Time Calculation:         PT Charges     Row Name 09/24/18 1108             Time Calculation    Start Time 1108  -CD      PT Received On 09/24/18  -CD      PT Goal Re-Cert Due Date 10/04/18  -CD         Time Calculation- PT    Total Timed Code Minutes- PT 40 minute(s)  -CD         Timed Charges    14638 - PT Therapeutic Exercise Minutes 10  -CD      99853 - PT Therapeutic Activity Minutes 30  -CD        User Key  (r) = Recorded By, (t) = Taken By, (c) = Cosigned By    Initials Name Provider Type    CD Angela Galvin, PT Physical Therapist        Therapy Suggested Charges     Code   Minutes Charges    89398 (CPT®) Hc Pt Neuromusc Re Education Ea 15 Min      83397 (CPT®) Hc Pt Ther Proc Ea 15 Min 10 1    66155 (CPT®) Hc Gait Training Ea 15 Min      50144 (CPT®) Hc Pt Therapeutic Act Ea 15 Min 30 2    88810 (CPT®) Hc Pt Manual Therapy Ea 15 Min      50192 (CPT®) Hc Pt Iontophoresis Ea 15 Min      56162 (CPT®) Hc Pt Elec Stim Ea-Per 15 Min      74692 (CPT®) Hc Pt Ultrasound Ea 15 Min      92954 (CPT®) Hc Pt Self Care/Mgmt/Train Ea 15 Min      61951 (CPT®) Hc Pt Prosthetic (S) Train Initial Encounter, Each 15 Min      06281 (CPT®) Hc Pt Orthotic(S)/Prosthetic(S) Encounter, Each 15 Min      61018 (CPT®) Hc Orthotic(S) Mgmt/Train Initial Encounter, Each 15min      Total  40 3        Therapy Charges for Today     Code Description Service Date Service Provider Modifiers Qty    55276259592 HC PT THER PROC EA 15 MIN 9/24/2018 Angela Galvin, PT GP 1    31957370300 HC PT THERAPEUTIC ACT EA 15 MIN 9/24/2018 Angela Galvin, PT GP 2          PT G-Codes  Outcome Measure  Options: AM-PAC 6 Clicks Basic Mobility (PT)  AM-PAC 6 Clicks Score: 13  Score: 14    Angela Galvin, PT  9/24/2018

## 2018-09-24 NOTE — PLAN OF CARE
Problem: Patient Care Overview  Goal: Plan of Care Review  Outcome: Ongoing (interventions implemented as appropriate)   09/24/18 1005   Plan of Care Review   Progress improving   OTHER   Outcome Summary Pt. able to tolerate to EOB mod assist overall, participating grooming task and to stand and turn to chair min assist. UE ther exer with JOHN pt. reporting cont. weakness RUE.    Coping/Psychosocial   Plan of Care Reviewed With patient

## 2018-09-24 NOTE — PLAN OF CARE
Problem: Fall Risk (Adult)  Goal: Absence of Fall  Outcome: Outcome(s) achieved Date Met: 09/24/18 09/24/18 0600   Fall Risk (Adult)   Absence of Fall achieves outcome

## 2018-09-24 NOTE — PLAN OF CARE
Problem: Patient Care Overview  Goal: Plan of Care Review  Outcome: Ongoing (interventions implemented as appropriate)   09/24/18 1005 09/24/18 1108   Plan of Care Review   Progress improving --    OTHER   Outcome Summary --  PT LIMITED BY WEAKNESS AND FATIGUE BUT RESPONDS WELL TO ENCOURAGEMENT. C/O DIZZINESS IN STANDING AND UNABLE TO AMBULATE BUT TOLERATED SIT TO STAND X 5 REPS WITH CUES FOR UPRIGHT POSTURE WITH MIN ASSIST OF 2. RECOMMEND SNF AT D/C.    Coping/Psychosocial   Plan of Care Reviewed With --  patient

## 2018-09-24 NOTE — PROGRESS NOTES
[facility] Medicine Services  PROGRESS NOTE    Patient Name: Dacia Nelson  : 1925  MRN: [id number]    Date of Admission: 2018  Length of Stay: 11  Primary Care Physician: Momo Breaux PA    Subjective     CC: f/u GI bleed     HPI:    Up in chair, daughter at bedside. Doing well. No rectal bleeding, nausea or vomiting. Per daughter, she seems more awake and interactive over the past 24-48 hours. Lower legs remain tender. Daughter that patient had urinary incontinence during hospitalization, currently on PureWick. When asked, patient does note that she feels urinary urge. Encouraged up to commode.     Review of Systems   Gen- No fevers, chills  CV- No chest pain, palpitations  Resp- No cough, dyspnea  GI- No N/V/D, abd pain    Otherwise ROS is negative except as mentioned in the HPI.    Objective     Vital Signs:   Temp:  [97.1 °F (36.2 °C)-98 °F (36.7 °C)] 97.9 °F (36.6 °C)  Heart Rate:  [] 100  Resp:  [12-18] 18  BP: ()/(49-65) 93/52  Total (NIH Stroke Scale): 3     Physical Exam:  Constitutional: No acute distress, awake, alert  HENT: NCAT, mucous membranes moist  Respiratory: Clear to auscultation bilaterally, respiratory effort normal   Cardiovascular: RRR, no murmurs, rubs, or gallops, palpable pedal pulses bilaterally  Gastrointestinal: Positive bowel sounds, soft, nontender, nondistended  Musculoskeletal: No bilateral ankle edema. BLEs are tender to palpation  Psychiatric: Appropriate affect, cooperative  Neurologic: Oriented x 3, strength symmetric in all extremities, Cranial Nerves grossly intact to confrontation, speech clear  Skin: No rashes    Results Reviewed:  I have personally reviewed current lab, radiology, and data and agree.      Results from last 7 days  Lab Units 18  0510 18  0718 18  0851  18  1112   WBC 10*3/mm3  --   --   --   --  11.96*   HEMOGLOBIN g/dL 8.9* 9.2* 9.5*  < > 9.3*   HEMATOCRIT % 27.7*  28.7* 28.3*  < > 27.8*   PLATELETS 10*3/mm3  --   --   --   --  242   < > = values in this interval not displayed.    Results from last 7 days  Lab Units 09/23/18  0718 09/22/18  0851 09/21/18  0740 09/19/18  1112   SODIUM mmol/L 140 143 139 135   POTASSIUM mmol/L 4.1 4.3 4.4 5.0   CHLORIDE mmol/L 115* 114* 116* 107   CO2 mmol/L 20.0 19.0* 20.0 22.0   BUN mg/dL 32* 33* 39* 31*   CREATININE mg/dL 1.12 1.19 1.24 1.28   GLUCOSE mg/dL 78 84 79 152*   CALCIUM mg/dL 7.6* 7.4* 6.7* 7.6*   ALT (SGPT) U/L  --   --   --  60*   AST (SGOT) U/L  --   --   --  60*     Estimated Creatinine Clearance: 29.4 mL/min (by C-G formula based on SCr of 1.12 mg/dL).  No results found for: BNP    Microbiology Results Abnormal     Procedure Component Value - Date/Time    Urine Culture - Urine, [[id number]]  (Abnormal)  (Susceptibility) Collected:  09/13/18 1813    Lab Status:  Final result Specimen:  Urine from Urine, Clean Catch Updated:  09/15/18 0928     Urine Culture >100,000 CFU/mL Escherichia coli (A)    Susceptibility      Escherichia coli     RICO     Amikacin <=16 ug/ml Susceptible     Ampicillin >16 ug/ml Resistant     Ampicillin + Sulbactam 16/8 ug/ml Intermediate     Aztreonam <=8 ug/ml Susceptible     Cefepime <=8 ug/ml Susceptible     Cefotaxime <=2 ug/ml Susceptible     Ceftriaxone <=8 ug/ml Susceptible     Cefuroxime sodium <=4 ug/ml Susceptible     Cephalothin 16 ug/ml Intermediate     Ertapenem <=1 ug/ml Susceptible     Gentamicin >8 ug/ml Resistant     Levofloxacin <=2 ug/ml Susceptible     Meropenem <=1 ug/ml Susceptible     Nitrofurantoin <=32 ug/ml Susceptible     Piperacillin + Tazobactam <=16 ug/ml Susceptible     Tetracycline >8 ug/ml Resistant     Tobramycin 8 ug/ml Intermediate     Trimethoprim + Sulfamethoxazole >2/38 ug/ml Resistant                        Imaging Results (last 24 hours)     ** No results found for the last 24 hours. **        I have reviewed the medications.      fluconazole 100 mg Oral Daily    gabapentin 200 mg Oral Nightly   multivitamin with minerals 1 tablet Oral Daily   pantoprazole 40 mg Oral BID AC   potassium chloride 10 mEq Oral Daily   sertraline 50 mg Oral Daily   cyancobalamin 100 mcg Oral Daily     Assessment / Plan     Hospital Problem List     * (Principal)Upper GI bleed    Overview Signed 9/19/2018  4:35 PM by Nayeli Rivas PA     Added automatically from request for surgery [id number]         Essential hypertension    Irritable bowel syndrome with both constipation and diarrhea    FTT (failure to thrive) in adult    Bereavement    Anemia    Chronic venous stasis dermatitis of both lower extremities    Myalgia    B12 deficiency    Duodenal ulcer        Brief Hospital Course to date:  Dacia Nelson is a 89 y.o. female admitted with weakness and was planned for rehab who had a bloody bm , was moved to telemetry and ultimately underwent egd by dr. wing whowing large duodenal ulcer w/ clot and visible vessel (s/p epi, clip, hemospray) and canidida noted as well    Assessment & Plan:    Acute upper GI bleed w/ symptomatic acute blood loss anemia   -s/p 2 units prbc 9/19/18   -s/p 2 units prbc 9/20/18  Bleeding duodenal ulcer (w/ clot and visible vessel)   S/p egd: large duodenal ulcer s/p endo clip and hemospray  Candida esophagitis   Also seen on egd; on diflucan  Syncope due to gi bleed  Generalized Weakness  - multifactorial problem  Myalgias   -was placed empirically on steroids for elevated esr (? Pmr), however the age corrected esr was normal (female age +10 / 2). Stopped steroids  S/p Elan (resolved)  ckd (baseline cr 1.2)  S/p E coli UTI (completed rx)  - s/p Invanz  B12 Deficiency   -replacing  ----------------------------  Plan:  -hgb stable  -stopped steroids 9/22/18 (had been on for 9.5 days for possible p.m.r as esr was 50 upon admission w/ myalgias; however that is normal for age & sex adjusted) and in light of severe bleeding duodenal ulcers risk benefit favored  discontinuation)  -bid ppi x 1 month, then daily thereafter (treating the bleeding duodenal ulcer)  -diflucan 100mg po daily through 9/29/18 (to complete 10 days) for candida esophagitis    -hgb in a.m.    *if rebleeds would need repeat egd (have stopped decadron)    DVT Prophylaxis: SCDs    CODE STATUS:   Code Status and Medical Interventions:   Ordered at: 09/13/18 2001     Limited Support to NOT Include:    Intubation    Dialysis    Cardioversion/Defibrillation    Artificial Nutrition     Level Of Support Discussed With:    Patient     Code Status:    No CPR     Medical Interventions (Level of Support Prior to Arrest):    Limited     Disposition: I expect the patient to be discharged to [facility] tomorrow     Electronically signed by Chely Lawson PA-C, 09/24/18, 12:55 PM.

## 2018-09-25 VITALS
OXYGEN SATURATION: 94 % | DIASTOLIC BLOOD PRESSURE: 80 MMHG | WEIGHT: 154.5 LBS | BODY MASS INDEX: 28.43 KG/M2 | SYSTOLIC BLOOD PRESSURE: 145 MMHG | TEMPERATURE: 97.5 F | HEART RATE: 67 BPM | HEIGHT: 62 IN | RESPIRATION RATE: 18 BRPM

## 2018-09-25 PROCEDURE — 99239 HOSP IP/OBS DSCHRG MGMT >30: CPT | Performed by: NURSE PRACTITIONER

## 2018-09-25 RX ORDER — ACETAMINOPHEN 325 MG/1
650 TABLET ORAL EVERY 4 HOURS PRN
Start: 2018-09-25

## 2018-09-25 RX ORDER — PANTOPRAZOLE SODIUM 40 MG/1
TABLET, DELAYED RELEASE ORAL
Start: 2018-09-25

## 2018-09-25 RX ORDER — MULTIPLE VITAMINS W/ MINERALS TAB 9MG-400MCG
1 TAB ORAL DAILY
Start: 2018-09-26

## 2018-09-25 RX ORDER — FLUCONAZOLE 100 MG/1
100 TABLET ORAL DAILY
Qty: 4 TABLET | Refills: 0
Start: 2018-09-26 | End: 2018-09-30

## 2018-09-25 RX ADMIN — VITAM B12 100 MCG: 100 TAB at 08:06

## 2018-09-25 RX ADMIN — SERTRALINE HYDROCHLORIDE 50 MG: 50 TABLET ORAL at 08:06

## 2018-09-25 RX ADMIN — POTASSIUM CHLORIDE 10 MEQ: 750 CAPSULE, EXTENDED RELEASE ORAL at 08:06

## 2018-09-25 RX ADMIN — PANTOPRAZOLE SODIUM 40 MG: 40 TABLET, DELAYED RELEASE ORAL at 08:06

## 2018-09-25 RX ADMIN — MULTIPLE VITAMINS W/ MINERALS TAB 1 TABLET: TAB ORAL at 08:06

## 2018-09-25 RX ADMIN — FLUCONAZOLE 100 MG: 100 TABLET ORAL at 08:06

## 2018-09-25 NOTE — DISCHARGE SUMMARY
[facility] Medicine Services  DISCHARGE SUMMARY    Patient Name: Dacia Nelson  : 1925  MRN: [id number]    Date of Admission: 2018  Date of Discharge:  2018  Primary Care Physician: Momo Breaux PA    Consults     Date and Time Order Name Status Description    2018 1128 Inpatient Gastroenterology Consult Completed         Hospital Course     Presenting Problem:   Acute renal failure, unspecified acute renal failure type (CMS/HCC) [N17.9]    Active Hospital Problems    Diagnosis Date Noted   • **Upper GI bleed [K92.2] 2018   • Myalgia [M79.1] 2018   • B12 deficiency [E53.8] 2018   • Duodenal ulcer [K26.9] 2018   • Chronic venous stasis dermatitis of both lower extremities [I87.2] 2018   • Anemia [D64.9] 2018   • FTT (failure to thrive) in adult [R62.7] 2018   • Bereavement [Z63.4] 2018   • Essential hypertension [I10] 2017   • Irritable bowel syndrome with both constipation and diarrhea [K58.2] 2017      Resolved Hospital Problems    Diagnosis Date Noted Date Resolved   • Acute renal failure (CMS/HCC) [N17.9] 2018   • Hyponatremia [E87.1] 2018   • Dehydration [E86.0] 2018   • UTI (urinary tract infection) [N39.0] 2018          Hospital Course:  Dacia Nelson is a 89 y.o. female he was initially admitted due to weakness and lower extremity edema as well as myalgias.  She was found have adult failure to thrive, UTI and acute kidney injury.  She received IV fluids and nephrotoxic medications were held.  Her kidney injury quickly resolved.  Her UTI was treated.  Initially there was concerns about polymyalgia rheumatica due to an elevated ESR and she was placed empirically on steroids.  There was a plan for rehabilitation but she developed bloody bowel movements.  She underwent EGD by Dr. Bledsoe which showed a large duodenal ulcer  with a clot visible vessel.  She underwent Endo Clip.  She is also noted to have Candida esophagitis and was started on Diflucan.  Steroids were stopped as her age adjusted ESR was normal.  Her bleeding has stopped.  She should remain on proton pump inhibitor twice daily for one month then once daily thereafter.  She should take Diflucan through 9/29 to complete course for Candida esophagitis. If she re-bleeds she will need repeat EGD. She needs rehab and this will take place at [facility].       Discharge Follow Up Recommendations for labs/diagnostics:  PCP when discharged from rehab    Day of Discharge     HPI:   Feels good, no complaints, tells me her leg pain has been chronic since her wound issues last year    Review of Systems  Gen- No fevers, chills  CV- No chest pain, palpitations  Resp- No cough, dyspnea  GI- No N/V/D, abd pain    Otherwise ROS is negative except as mentioned in the HPI.    Vital Signs:   Temp:  [97.1 °F (36.2 °C)-98.5 °F (36.9 °C)] 97.4 °F (36.3 °C)  Heart Rate:  [] 66  Resp:  [18] 18  BP: ()/(48-77) 135/63     Physical Exam:  Gen-no acute distress, resting in bed  CV-RRR, S1 S2 normal, no m/r/g  Resp-faint exp wheeze on right side, clear el 00sewhere, normal WOB, on RA  Abd-soft, NT, ND, +BS  Ext-no edema,  PPP, chronic venous stasis changes noted of bilaterally  Neuro-A&Ox3, no focal deficits  Psych-appropriate mood    Pertinent  and/or Most Recent Results       Results from last 7 days  Lab Units 09/24/18  0510 09/23/18  0718 09/22/18  0851 09/21/18  2005 09/21/18  1803 09/21/18  1605 09/21/18  0740  09/19/18  1112 09/19/18  0450   WBC 10*3/mm3  --   --   --   --   --   --   --   --  11.96*  --    HEMOGLOBIN g/dL 8.9* 9.2* 9.5* 9.6* 9.8* 9.3* 9.8*  < > 9.3*  --    HEMATOCRIT % 27.7* 28.7* 28.3* 28.4* 29.7* 27.4* 29.5*  < > 27.8*  --    PLATELETS 10*3/mm3  --   --   --   --   --   --   --   --  242  --    SODIUM mmol/L  --  140 143  --   --   --  139  --  135 138   POTASSIUM  mmol/L  --  4.1 4.3  --   --   --  4.4  --  5.0 4.6   CHLORIDE mmol/L  --  115* 114*  --   --   --  116*  --  107 108   CO2 mmol/L  --  20.0 19.0*  --   --   --  20.0  --  22.0 23.0   BUN mg/dL  --  32* 33*  --   --   --  39*  --  31* 28*   CREATININE mg/dL  --  1.12 1.19  --   --   --  1.24  --  1.28 1.17   GLUCOSE mg/dL  --  78 84  --   --   --  79  --  152* 87   CALCIUM mg/dL  --  7.6* 7.4*  --   --   --  6.7*  --  7.6* 7.7*   < > = values in this interval not displayed.    Results from last 7 days  Lab Units 09/19/18  1112   BILIRUBIN mg/dL 0.4   ALK PHOS U/L 87   ALT (SGPT) U/L 60*   AST (SGOT) U/L 60*         Brief Urine Lab Results  (Last result in the past 365 days)      Color   Clarity   Blood   Leuk Est   Nitrite   Protein   CREAT   Urine HCG        09/13/18 1813             117.6       09/13/18 1813 Yellow Cloudy(A) Negative Small (1+)(A) Positive(A) Negative               Microbiology Results Abnormal     Procedure Component Value - Date/Time    Urine Culture - Urine, [[id number]]  (Abnormal)  (Susceptibility) Collected:  09/13/18 1813    Lab Status:  Final result Specimen:  Urine from Urine, Clean Catch Updated:  09/15/18 0928     Urine Culture >100,000 CFU/mL Escherichia coli (A)    Susceptibility      Escherichia coli     RICO     Amikacin <=16 ug/ml Susceptible     Ampicillin >16 ug/ml Resistant     Ampicillin + Sulbactam 16/8 ug/ml Intermediate     Aztreonam <=8 ug/ml Susceptible     Cefepime <=8 ug/ml Susceptible     Cefotaxime <=2 ug/ml Susceptible     Ceftriaxone <=8 ug/ml Susceptible     Cefuroxime sodium <=4 ug/ml Susceptible     Cephalothin 16 ug/ml Intermediate     Ertapenem <=1 ug/ml Susceptible     Gentamicin >8 ug/ml Resistant     Levofloxacin <=2 ug/ml Susceptible     Meropenem <=1 ug/ml Susceptible     Nitrofurantoin <=32 ug/ml Susceptible     Piperacillin + Tazobactam <=16 ug/ml Susceptible     Tetracycline >8 ug/ml Resistant     Tobramycin 8 ug/ml Intermediate     Trimethoprim +  Sulfamethoxazole >2/38 ug/ml Resistant                          Imaging Results (all)     Procedure Component Value Units Date/Time    XR Chest 1 View [[id number]] Collected:  09/13/18 1644     Updated:  09/13/18 1649    Narrative:       EXAMINATION: XR CHEST 1 VW- 09/13/2018     INDICATION: Weak/Dizzy/AMS triage protocol      COMPARISON: 08/13/2018     FINDINGS: Cardiac silhouette is normal. There are chronic pulmonary  changes. There is no acute inflammatory process, mass or effusion.           Impression:       Chronic change; no acute findings.     D:  09/13/2018  E:  09/13/2018     This report was finalized on 9/13/2018 4:47 PM by Dr. Wild Hill MD.             Results for orders placed during the hospital encounter of 09/13/18   Duplex Venous Lower Extremity - Bilateral CAR    Narrative · Normal bilateral lower extremity venous duplex scan.                 Discharge Details        Discharge Medications      New Medications      Instructions Start Date   acetaminophen 325 MG tablet  Commonly known as:  TYLENOL   650 mg, Oral, Every 4 Hours PRN      cyanocobalamin 100 MCG tablet   100 mcg, Oral, Daily      fluconazole 100 MG tablet  Commonly known as:  DIFLUCAN   100 mg, Oral, Daily  Through 9/29    multivitamin with minerals tablet tablet   1 tablet, Oral, Daily      pantoprazole 40 MG EC tablet  Commonly known as:  PROTONIX   1 po BID x 1 month, then once daily thereafter         Continue These Medications      Instructions Start Date   furosemide 20 MG tablet  Commonly known as:  LASIX   20 mg, Oral, Every Other Day      potassium chloride 10 MEQ CR tablet  Commonly known as:  K-DUR   10 mEq, Oral, Daily      sertraline 50 MG tablet  Commonly known as:  ZOLOFT   50 mg, Oral, Daily             Discharge Disposition: [place]    Discharge Diet: GI soft bland as tolerated    Discharge Activity: as tolerated    Code Status/Level of Support:  Code Status and Medical Interventions:   Ordered at: 09/13/18 2001      Limited Support to NOT Include:    Intubation    Dialysis    Cardioversion/Defibrillation    Artificial Nutrition     Level Of Support Discussed With:    Patient     Code Status:    No CPR     Medical Interventions (Level of Support Prior to Arrest):    Limited       Additional Instructions for the Follow-ups that You Need to Schedule     Discharge Follow-up with PCP    As directed      Currently Documented PCP:  Momo Breaux PA  PCP Phone Number:  [phone]    Follow Up Details:  PCP when discharged from rehab               Time Spent on Discharge:  40 minutes    Electronically signed by LEXI Allan, 09/25/18, 8:22 AM.

## 2018-09-25 NOTE — PROGRESS NOTES
"                  Clinical Nutrition     Nutrition Assessment  Reason for Visit:   Follow-up protocol      Patient Name: Dacia Nelson  YOB: 1925  MRN: [id number]  Date of Encounter: 09/25/18 3:11 PM  Admission date: 9/13/2018      Nutrition Assessment   Assessment       Hospital Problem List  Principal Problem:    Upper GI bleed  Active Problems:    Essential hypertension    Irritable bowel syndrome with both constipation and diarrhea    FTT (failure to thrive) in adult    Bereavement    Anemia    Chronic venous stasis dermatitis of both lower extremities    Myalgia    B12 deficiency    Duodenal ulcer      PMH: She  has a past medical history of Arthritis; Cellulitis; Gastritis; and Kidney stones.   PSxH: She  has a past surgical history that includes Hysterectomy; Cholecystectomy; Cataract extraction, bilateral; and Esophagogastroduodenoscopy (N/A, 9/20/2018).       Reported/Observed/Food/Nutrition Related History:     Pt resting during time of visit. Family at bedside report pt has poor appetite, disliked boost breeze- made her nauseated, reported that she does drink a couple carnation instant breakfast drinks per day.       Anthropometrics     Height: 157.5 cm (62\")  Last filed wt: Weight: 70.1 kg (154 lb 8 oz) (09/20/18 0841)  Weight Method: Stated    BMI: BMI (Calculated): 28.3  Overweight: 25.0-29.9kg/m2     Ideal Body Weight (IBW) (kg): 50.43    Labs reviewed       Results from last 7 days  Lab Units 09/23/18  0718 09/22/18  0851 09/21/18  0740 09/19/18  1112   GLUCOSE mg/dL 78 84 79 152*   BUN mg/dL 32* 33* 39* 31*   CREATININE mg/dL 1.12 1.19 1.24 1.28   SODIUM mmol/L 140 143 139 135   CHLORIDE mmol/L 115* 114* 116* 107   POTASSIUM mmol/L 4.1 4.3 4.4 5.0   ALT (SGPT) U/L  --   --   --  60*       Results from last 7 days  Lab Units 09/19/18  1112   ALBUMIN g/dL 2.35*           Results from last 7 days  Lab Units 09/19/18  1107   GLUCOSE mg/dL 222*       Current Nutrition Prescription "     PO: Diet Regular; GI Soft/[place]    Active Supplement Orders      Dietary Nutrition Supplements Other (See Comment) carnation instant breakfast (BID)   Dietary Nutrition Supplements Clear Liquid Supplement TID    Intake:  Per charting pt consuming 33% of 6 meals , per family pt has been drinking a couple carnation instant breakfast drinks per day       Nutrition Diagnosis       Problem Inadequate oral intake   Etiology Clinical condition   Signs/Symptoms PO intake 33% of 6 meals      Nutrition Intervention   1.  Follow treatment progress, Care plan reviewed, Adjusted supplement, Encourage intake  Will D/C boost breeze supplements      Goal:   General: Nutrition support treatment  PO: Increase intake  Additional goals:      Monitoring/Evaluation:   Per protocol, PO intake, Supplement intake      Will Continue to follow per protocol      Carla STAFFORD Waits  Time Spent: 20 minutes

## 2018-09-26 ENCOUNTER — PATIENT OUTREACH (OUTPATIENT)
Dept: CASE MANAGEMENT | Facility: OTHER | Age: 83
End: 2018-09-26

## 2018-09-26 NOTE — OUTREACH NOTE
Skilled Nursing Facility Discharge Flowsheet:     Skilled Nursing Facility Discharge Assessment 9/26/2018   Acute Facility Discharged From [place]   Acute Discharge Date 9/25/2018   Name of the Skilled Nursing Facility? [facility]   Tier Level of the Skilled Nursing Facility 4   Purpose of SNF Admission PT;OT   Estimated length of stay for the patient? UNDETERMINED   Who is the insurance provider or payor of patient stay? Medicare   Progression of Patient? TALKED TO EVELIO, PATIENT'S NURSE AT [facility].  PATIENT IS THERE FOR SHORT TERM REHAB.  NO DC PLANS AT THIS TIME.

## 2018-10-01 ENCOUNTER — PATIENT OUTREACH (OUTPATIENT)
Dept: CASE MANAGEMENT | Facility: OTHER | Age: 83
End: 2018-10-01

## 2018-10-01 NOTE — OUTREACH NOTE
Skilled Nursing Facility Discharge Flowsheet:     Skilled Nursing Facility Discharge Assessment 10/1/2018   Acute Facility Discharged From [place]   Acute Discharge Date 9/25/2018   Name of the Skilled Nursing Facility? [facility]   Tier Level of the Skilled Nursing Facility 4   Purpose of SNF Admission PT;OT   Estimated length of stay for the patient? UNDETERMINED   Who is the insurance provider or payor of patient stay? Medicare   Progression of Patient? Talked to Nancy, patient's nurse at [facility].  Patient continues to receive Therapy for short-term rehab.  No known DC date at this time.

## 2018-10-08 ENCOUNTER — PATIENT OUTREACH (OUTPATIENT)
Dept: CASE MANAGEMENT | Facility: OTHER | Age: 83
End: 2018-10-08

## 2018-10-08 NOTE — OUTREACH NOTE
Skilled Nursing Facility Discharge Flowsheet:     Skilled Nursing Facility Discharge Assessment 10/8/2018   Acute Facility Discharged From [place]   Acute Discharge Date 9/25/2018   Name of the Skilled Nursing Facility? [facility]   Tier Level of the Skilled Nursing Facility 4   Purpose of SNF Admission PT;OT   Estimated length of stay for the patient? UNDETERMINED   Who is the insurance provider or payor of patient stay? Medicare   Progression of Patient? Talked to LUIS Garrett at [facility].  Patient continues to receive Therapy.  No DC date set at this time.

## 2018-10-15 ENCOUNTER — PATIENT OUTREACH (OUTPATIENT)
Dept: CASE MANAGEMENT | Facility: OTHER | Age: 83
End: 2018-10-15

## 2018-10-15 NOTE — OUTREACH NOTE
Skilled Nursing Facility Discharge Flowsheet:     Skilled Nursing Facility Discharge Assessment 10/15/2018   Acute Facility Discharged From [place]   Acute Discharge Date 9/25/2018   Name of the Skilled Nursing Facility? [facility]   Tier Level of the Skilled Nursing Facility 4   Purpose of SNF Admission PT;OT   Estimated length of stay for the patient? UNDETERMINED   Who is the insurance provider or payor of patient stay? Medicare   Progression of Patient? Talked with EDA Dickey at [facility].  Patient continues to receive Therapy.  No DC date set yet.

## 2018-10-22 ENCOUNTER — PATIENT OUTREACH (OUTPATIENT)
Dept: CASE MANAGEMENT | Facility: OTHER | Age: 83
End: 2018-10-22

## 2018-10-22 NOTE — OUTREACH NOTE
Skilled Nursing Facility Discharge Flowsheet:     Skilled Nursing Facility Discharge Assessment 10/22/2018   Acute Facility Discharged From [place]   Acute Discharge Date 9/25/2018   Name of the Skilled Nursing Facility? [facility]   Tier Level of the Skilled Nursing Facility 4   Purpose of SNF Admission PT;OT   Estimated length of stay for the patient? UNDETERMINED   Who is the insurance provider or payor of patient stay? Medicare   Progression of Patient? Talked with EDA Lubin at [facility].  Patient is still receiving Therapy under Medicare-A short term rehab benefits.  Patient will soon be Discharged from Medicare and stay a little bit longer, possibly until the first week of November, under Private Pay.  Plans to transfer to another facility as LTC, closer to daughter.

## 2018-10-30 ENCOUNTER — PATIENT OUTREACH (OUTPATIENT)
Dept: CASE MANAGEMENT | Facility: OTHER | Age: 83
End: 2018-10-30

## 2018-10-30 NOTE — OUTREACH NOTE
Skilled Nursing Facility Discharge Flowsheet:     Skilled Nursing Facility Discharge Assessment 10/30/2018   Acute Facility Discharged From [place]   Acute Discharge Date 9/25/2018   Name of the Skilled Nursing Facility? [facility]   Tier Level of the Skilled Nursing Facility 4   Purpose of SNF Admission PT;OT   Estimated length of stay for the patient? 10-23-18   Who is the insurance provider or payor of patient stay? Medicare   Progression of Patient? Talked with Syl in the Business Office, and EDA Lubin,  at [facility].  Patient switched from Medicare to Private Pay on 10-23-18.   Plan is to transfer to a different LTC facility closer to the daughter's home.   Skilled Nursing Discharge Date? 10/23/2018   Where was the patient discharged to? LTC

## 2018-11-05 ENCOUNTER — EPISODE CHANGES (OUTPATIENT)
Dept: CASE MANAGEMENT | Facility: OTHER | Age: 83
End: 2018-11-05

## 2020-09-08 NOTE — TELEPHONE ENCOUNTER
Found in allscripts.  She was on triamterene/HCTZ 37.5/25 mg 1 daily as needed, #90, 3 refills Detail Level: Detailed

## 2022-11-01 NOTE — ED PROVIDER NOTES
"Vivek Nelson is a 89 y.o.female who presents to the ED with complaints of generalized weakness. The patient reports she has been experiencing worsening generalized weakness over the past 2 days. She states she is unable to get out of bed under her own strength. She recently started some new medications two days ago. She also complains of a decreased appetite, LE edema, fatigue, and diarrhea, but she denies any rhinorrhea, sore throat, cough, weight loss, or dysuria. Her daughter reports she has been much less social over the past 3 weeks, for she has not went to [employer] or the grocery store. The patient reports she \"feels like she's dying.\" There are no other complaints at this time.          History provided by:  Patient and relative  Weakness - Generalized   Severity:  Moderate  Onset quality:  Sudden  Duration:  2 days  Timing:  Constant  Progression:  Worsening  Chronicity:  New  Relieved by:  None tried  Worsened by:  Nothing  Ineffective treatments:  None tried  Associated symptoms: diarrhea    Associated symptoms: no cough and no dysuria      Review of Previous Visits    Dr. Benites reviewed her recent visit. She was prescribed Zoloft, Lasix, and Potassium two days ago.     Review of Systems   Constitutional: Positive for appetite change and fatigue. Negative for unexpected weight change.   HENT: Negative for rhinorrhea and sore throat.    Respiratory: Negative for cough.    Cardiovascular: Positive for leg swelling.   Gastrointestinal: Positive for diarrhea.   Genitourinary: Negative for dysuria.   Neurological: Positive for weakness (generalized).   All other systems reviewed and are negative.      Past Medical History:   Diagnosis Date   • Arthritis    • Cellulitis    • Gastritis        Allergies   Allergen Reactions   • Eggs Or Egg-Derived Products Hives   • Penicillins Unknown (See Comments)     Can't remember   • Sulfa Antibiotics Hives       Past Surgical History:   Procedure Laterality " Date   • HYSTERECTOMY         History reviewed. No pertinent family history.    Social History     Social History   • Marital status:      Social History Main Topics   • Smoking status: Never Smoker   • Smokeless tobacco: Never Used   • Alcohol use No   • Drug use: No   • Sexual activity: Defer     Other Topics Concern   • Not on file         Objective   Physical Exam   Constitutional: She is oriented to person, place, and time. She appears well-developed and well-nourished. No distress.   Patient is an elderly female who is hard of hearing, but still able to give a good history.    HENT:   Head: Normocephalic and atraumatic.   Nose: Nose normal.   Mouth/Throat: Oropharynx is clear and moist.   Airway patent. Pharynx benign.    Eyes: Pupils are equal, round, and reactive to light. Conjunctivae and EOM are normal. No scleral icterus.   Neck: Normal range of motion. Neck supple.   Cardiovascular: Normal rate, regular rhythm, normal heart sounds and intact distal pulses.    No murmur heard.  Pulmonary/Chest: Effort normal and breath sounds normal. No respiratory distress. She has no wheezes. She has no rales.   Abdominal: Soft. Bowel sounds are normal. There is no tenderness.   Mildly obese   Musculoskeletal: Normal range of motion. She exhibits edema.   Edema 2/3 the way up her calves bilaterally. Venostasis changes bilaterally.    Lymphadenopathy:     She has no cervical adenopathy.   Neurological: She is alert and oriented to person, place, and time.   Global weakness   Skin: Skin is warm and dry.   Psychiatric: She has a normal mood and affect. Her behavior is normal.   Nursing note and vitals reviewed.      Procedures         ED Course  ED Course as of Sep 13 1853   Thu Sep 13, 2018   1842 Dr. Benites is bedside reevaluating the patient and discussing the treatment plan.   [TB]      ED Course User Index  [TB] Wilfred Grimaldo     Recent Results (from the past 24 hour(s))   Comprehensive Metabolic Panel     Collection Time: 09/13/18  4:37 PM   Result Value Ref Range    Glucose 98 70 - 100 mg/dL    BUN 38 (H) 9 - 23 mg/dL    Creatinine 2.12 (H) 0.60 - 1.30 mg/dL    Sodium 127 (L) 132 - 146 mmol/L    Potassium 4.9 3.5 - 5.5 mmol/L    Chloride 91 (L) 99 - 109 mmol/L    CO2 19.0 (L) 20.0 - 31.0 mmol/L    Calcium 8.4 (L) 8.7 - 10.4 mg/dL    Total Protein 6.6 5.7 - 8.2 g/dL    Albumin 3.04 (L) 3.20 - 4.80 g/dL    ALT (SGPT) 37 7 - 40 U/L    AST (SGOT) 31 0 - 33 U/L    Alkaline Phosphatase 152 (H) 25 - 100 U/L    Total Bilirubin 0.8 0.3 - 1.2 mg/dL    eGFR Non African Amer 22 (L) >60 mL/min/1.73    Globulin 3.6 gm/dL    A/G Ratio 0.9 (L) 1.5 - 2.5 g/dL    BUN/Creatinine Ratio 17.9 7.0 - 25.0    Anion Gap 17.0 (H) 3.0 - 11.0 mmol/L   Magnesium    Collection Time: 09/13/18  4:37 PM   Result Value Ref Range    Magnesium 2.0 1.3 - 2.7 mg/dL   Light Blue Top    Collection Time: 09/13/18  4:37 PM   Result Value Ref Range    Extra Tube hold for add-on    Green Top (Gel)    Collection Time: 09/13/18  4:37 PM   Result Value Ref Range    Extra Tube Hold for add-ons.    Lavender Top    Collection Time: 09/13/18  4:37 PM   Result Value Ref Range    Extra Tube hold for add-on    Gold Top - SST    Collection Time: 09/13/18  4:37 PM   Result Value Ref Range    Extra Tube Hold for add-ons.    CBC Auto Differential    Collection Time: 09/13/18  4:37 PM   Result Value Ref Range    WBC 6.56 3.50 - 10.80 10*3/mm3    RBC 3.53 (L) 3.89 - 5.14 10*6/mm3    Hemoglobin 11.8 11.5 - 15.5 g/dL    Hematocrit 35.0 34.5 - 44.0 %    MCV 99.2 (H) 80.0 - 99.0 fL    MCH 33.4 (H) 27.0 - 31.0 pg    MCHC 33.7 32.0 - 36.0 g/dL    RDW 15.2 (H) 11.3 - 14.5 %    RDW-SD 55.3 (H) 37.0 - 54.0 fl    MPV 10.1 6.0 - 12.0 fL    Platelets 158 150 - 450 10*3/mm3    Neutrophil % 75.9 (H) 41.0 - 71.0 %    Lymphocyte % 9.1 (L) 24.0 - 44.0 %    Monocyte % 11.7 0.0 - 12.0 %    Eosinophil % 2.1 0.0 - 3.0 %    Basophil % 1.2 (H) 0.0 - 1.0 %    Immature Grans % 0.3 0.0 - 0.6 %     Neutrophils, Absolute 4.97 1.50 - 8.30 10*3/mm3    Lymphocytes, Absolute 0.60 0.60 - 4.80 10*3/mm3    Monocytes, Absolute 0.77 0.00 - 1.00 10*3/mm3    Eosinophils, Absolute 0.14 0.00 - 0.30 10*3/mm3    Basophils, Absolute 0.08 0.00 - 0.20 10*3/mm3    Immature Grans, Absolute 0.02 0.00 - 0.03 10*3/mm3   BNP    Collection Time: 09/13/18  4:37 PM   Result Value Ref Range    .0 (H) 0.0 - 100.0 pg/mL   TSH    Collection Time: 09/13/18  4:37 PM   Result Value Ref Range    TSH 4.900 0.350 - 5.350 mIU/mL   Sedimentation Rate    Collection Time: 09/13/18  4:37 PM   Result Value Ref Range    Sed Rate 50 (H) 0 - 30 mm/hr   POC Troponin, Rapid    Collection Time: 09/13/18  5:03 PM   Result Value Ref Range    Troponin I 0.01 0.00 - 0.07 ng/mL   Duplex Venous Lower Extremity - Bilateral CAR    Collection Time: 09/13/18  5:53 PM   Result Value Ref Range    BSA 1.7 m^2     CV ECHO KAYLEY - BZI_BMI 28.3 kilograms/m^2     [facility] 1.[facility] m^2     CV ECHO KAYLEY - BZI_METRIC_WEIGHT 68.0 kg     CV ECHO KAYLEY - BZI_METRIC_HEIGHT 154.9 cm    Right Common Femoral Spont Y     Right Common Femoral Phasic Y     Right Common Femoral Augment Y     Right Common Femoral Compress C     Right Saphenofemoral Junction Spont Y     Right Saphenofemoral Junction Phasic Y     Right Saphenofemoral Junction Augment Y     Right Saphenofemoral Junction Compress C     Right Profunda Femoral Compress C     Right Proximal Femoral Compress C     Right Mid Femoral Spont Y     Right Mid Femoral Phasic Y     Right Mid Femoral Augment Y     Right Mid Femoral Compress C     Right Distal Femoral Compress C     Right Popliteal Spont Y     Right Popliteal Phasic Y     Right Popliteal Augment Y     Right Popliteal Compress C     Right Posterior Tibial Compress C     Right Peroneal Compress C     Right GastronemiusSoleal Compress C     Right Greater Saph AK Compress C     Right Greater Saph BK Compress C     Right Lesser Saph Compress C     Left  Common Femoral Spont Y     Left Common Femoral Phasic Y     Left Common Femoral Augment Y     Left Common Femoral Competent Y     Left Common Femoral Compress C     Left Saphenofemoral Junction Spont Y     Left Saphenofemoral Junction Phasic Y     Left Saphenofemoral Junction Augment Y     Left Saphenofemoral Junction Compress C     Left Profunda Femoral Spont Y     Left Profunda Femoral Phasic Y     Left Profunda Femoral Augment Y     Left Profunda Femoral Compress C     Left Proximal Femoral Compress C     Left Mid Femoral Spont Y     Left Mid Femoral Phasic Y     Left Mid Femoral Augment Y     Left Mid Femoral Compress C     Left Distal Femoral Compress C     Left Popliteal Spont Y     Left Popliteal Phasic Y     Left Popliteal Augment Y     Left Popliteal Compress C     Left Posterior Tibial Compress C     Left Peroneal Compress C     Left GastronemiusSoleal Compress C     Left Greater Saph AK Compress C     Left Greater Saph BK Compress C     Left Lesser Saph Compress C    Urinalysis With Microscopic If Indicated (No Culture) - Urine, Clean Catch    Collection Time: 09/13/18  6:13 PM   Result Value Ref Range    Color, UA Yellow Yellow, Straw    Appearance, UA Cloudy (A) Clear    pH, UA <=5.0 5.0 - 8.0    Specific Gravity, UA 1.014 1.001 - 1.030    Glucose, UA Negative Negative    Ketones, UA Trace (A) Negative    Bilirubin, UA Negative Negative    Blood, UA Negative Negative    Protein, UA Negative Negative    Leuk Esterase, UA Small (1+) (A) Negative    Nitrite, UA Positive (A) Negative    Urobilinogen, UA 1.0 E.U./dL 0.2 - 1.0 E.U./dL   Urinalysis, Microscopic Only - Urine, Clean Catch    Collection Time: 09/13/18  6:13 PM   Result Value Ref Range    RBC, UA 0-2 None Seen, 0-2 /HPF    WBC, UA 6-12 (A) None Seen, 0-2 /HPF    Bacteria, UA 4+ (A) None Seen, Trace /HPF    Squamous Epithelial Cells, UA 0-2 None Seen, 0-2 /HPF    Hyaline Casts, UA 0-6 0 - 6 /LPF    Methodology Automated Microscopy      Note: In  addition to lab results from this visit, the labs listed above may include labs taken at another facility or during a different encounter within the last 24 hours. Please correlate lab times with ED admission and discharge times for further clarification of the services performed during this visit.    XR Chest 1 View   Final Result   Chronic change; no acute findings.       D:  09/13/2018   E:  09/13/2018       This report was finalized on 9/13/2018 4:47 PM by Dr. Wild Hill MD.            Vitals:    09/13/18 1613 09/13/18 1633 09/13/18 1800 09/13/18 1801   BP: 132/52 124/54 143/69    Pulse: 79 74  77   Resp: 14      Temp: 97.4 °F (36.3 °C)      TempSrc: Oral      SpO2: 99% 95%  100%   Weight:       Height:         Medications   sodium chloride 0.9 % flush 10 mL (not administered)   sodium chloride 0.9 % bolus 500 mL (not administered)   sodium chloride 0.9 % infusion (not administered)   ondansetron (ZOFRAN) injection 4 mg (not administered)   famotidine (PEPCID) injection 20 mg (not administered)   dexamethasone (DECADRON) injection 8 mg (not administered)     ECG/EMG Results (last 24 hours)     Procedure Component Value Units Date/Time    ECG 12 Lead [[id number]] Collected:  09/13/18 1622     Updated:  09/13/18 1653    Narrative:       Test Reason : CHEST PRESSURE  Blood Pressure : **/** mmHG  Vent. Rate : 077 BPM     Atrial Rate : 077 BPM     P-R Int : 146 ms          QRS Dur : 088 ms      QT Int : 414 ms       P-R-T Axes : 034 -09 024 degrees     QTc Int : 468 ms    ** Poor data quality, interpretation may be adversely affected  Sinus rhythm with premature atrial complexes  Low voltage QRS  Borderline ECG  When compared with ECG of 13-AUG-2018 12:56,  premature atrial complexes are now present  Nonspecific T wave abnormality, improved in Inferior leads  Nonspecific T wave abnormality no longer evident in Anterior leads  Confirmed by JENNIFER OCAMPO, NUNU ([id number]) on 9/13/2018 4:53:22 PM    Referred By:  COLE            Confirmed By:NUNU RODRIGUEZ MD                        MDM  Number of Diagnoses or Management Options  Acute renal failure, unspecified acute renal failure type (CMS/HCC):   Adult failure to thrive syndrome:   Dehydration with hyponatremia:   Dependent edema:   Malnutrition compromising bodily function (CMS/HCC):   Diagnosis management comments:       I reviewed all available studies the bedside the patient and her family.  Unfortunately she has some significant laboratory findings.  Looks like she has acute renal failure which is new compared with her labs from a month ago.  Thankfully she has no incidence of clot in her legs on ultrasound and no significant heart failure.  I suspect her edema is due to dependent edema combined with a low albumin due to decreased nutrition almost a kawashiorkor like syndrome.    She has diffuse muscle aches and her sedimentation rate is 50.  She may have some component of polymyalgia rheumatica.  We'll try a course of steroids to see that improves things.  She also has some dyspepsia and some nausea and I've given her ondansetron and IV Pepcid for this.      Hydrating the patient.    I think she needs be admitted the hospital for serial exams and I think she'll probably need rehabilitation in both her and her daughter realistic understanding her days of living independently have come to an end.  Case management is already seeing the patient and her family.  I called Dr. Vanegas, on-call hospital medicine, to admit the patient.    All are agreeable with the plan        Amount and/or Complexity of Data Reviewed  Clinical lab tests: reviewed  Tests in the radiology section of CPT®: reviewed  Tests in the medicine section of CPT®: reviewed  Decide to obtain previous medical records or to obtain history from someone other than the patient: yes        Final diagnoses:   Acute renal failure, unspecified acute renal failure type (CMS/HCC)   Dehydration with hyponatremia   Adult  failure to thrive syndrome   Malnutrition compromising bodily function (CMS/HCC)   Dependent edema       Documentation assistance provided by alexandra Grimaldo.  Information recorded by the alexandra was done at my direction and has been verified and validated by me.     Wilfred Grimaldo  09/13/18 [id number]       Wilfred Grimaldo  09/13/18 [id number]       Holden Benites MD  09/14/18 0122     Call bell/Explanation of exam/test/Position of comfort

## (undated) DEVICE — SINGLE-USE BIOPSY FORCEPS: Brand: RADIAL JAW 4

## (undated) DEVICE — THE CARR-LOCKE INJECTION NEEDLE IS A SINGLE USE, DISPOSABLE, FLEXIBLE SHEATH INJECTION NEEDLE USED FOR THE INJECTION OF VARIOUS TYPES OF MEDIA THROUGH FLEXIBLE ENDOSCOPES.

## (undated) DEVICE — Device: Brand: DEFENDO AIR/WATER/SUCTION AND BIOPSY VALVE

## (undated) DEVICE — THE BITE BLOCK MAXI, LATEX FREE STRAP IS USED TO PROTECT THE ENDOSCOPE INSERTION TUBE FROM BEING BITTEN BY THE PATIENT.

## (undated) DEVICE — SOL LR 1000ML

## (undated) DEVICE — SENSR O2 OXIMAX FNGR A/ 18IN NONSTR

## (undated) DEVICE — CANN NASL CO2 DIVIDED A/

## (undated) DEVICE — DEV HEMOSPRAY ENDO HEMOST 7F 220CM